# Patient Record
Sex: MALE | Race: WHITE | Employment: FULL TIME | URBAN - METROPOLITAN AREA
[De-identification: names, ages, dates, MRNs, and addresses within clinical notes are randomized per-mention and may not be internally consistent; named-entity substitution may affect disease eponyms.]

---

## 2017-01-25 DIAGNOSIS — M15.9 PRIMARY OSTEOARTHRITIS INVOLVING MULTIPLE JOINTS: ICD-10-CM

## 2017-01-26 NOTE — TELEPHONE ENCOUNTER
Patient's last office visit on 11-14-16  Medication(s) last filled on 12-30-16  Next Appointment: 02-06-17  Rx Class Print

## 2017-01-27 RX ORDER — HYDROCODONE BITARTRATE AND ACETAMINOPHEN 7.5; 325 MG/1; MG/1
1 TABLET ORAL
Qty: 150 TAB | Refills: 0 | Status: SHIPPED | OUTPATIENT
Start: 2017-01-27 | End: 2017-02-20 | Stop reason: SDUPTHER

## 2017-02-20 DIAGNOSIS — M15.9 PRIMARY OSTEOARTHRITIS INVOLVING MULTIPLE JOINTS: ICD-10-CM

## 2017-02-20 NOTE — TELEPHONE ENCOUNTER
Patient's last office visit on 11-04-17  Medication(s) last filled on 01-27-17  Next Appointment: 02-24-17  Rx Class Print

## 2017-02-22 RX ORDER — HYDROCODONE BITARTRATE AND ACETAMINOPHEN 7.5; 325 MG/1; MG/1
1 TABLET ORAL
Qty: 150 TAB | Refills: 0 | Status: SHIPPED | OUTPATIENT
Start: 2017-02-25 | End: 2017-03-20 | Stop reason: SDUPTHER

## 2017-02-22 NOTE — TELEPHONE ENCOUNTER
reviewed, no worrisome findings. This is an early request (last month 1/27/17) - will date it for 2/25/17.

## 2017-03-17 ENCOUNTER — TELEPHONE (OUTPATIENT)
Dept: FAMILY MEDICINE CLINIC | Facility: CLINIC | Age: 52
End: 2017-03-17

## 2017-03-17 DIAGNOSIS — M15.9 PRIMARY OSTEOARTHRITIS INVOLVING MULTIPLE JOINTS: ICD-10-CM

## 2017-03-17 NOTE — TELEPHONE ENCOUNTER
Patient's last office visit on 11-14-16  Medication(s) last filled on 02-25-17  Next Appointment: 03-24-17  Rx Class Print

## 2017-03-20 RX ORDER — HYDROCODONE BITARTRATE AND ACETAMINOPHEN 7.5; 325 MG/1; MG/1
1 TABLET ORAL
Qty: 150 TAB | Refills: 0 | Status: SHIPPED | OUTPATIENT
Start: 2017-03-20 | End: 2017-04-17 | Stop reason: SDUPTHER

## 2017-03-21 NOTE — TELEPHONE ENCOUNTER
Patient requested to have pain medication refilled. Refill was approved and waiting for the patient to  the prescription.

## 2017-03-24 ENCOUNTER — OFFICE VISIT (OUTPATIENT)
Dept: FAMILY MEDICINE CLINIC | Facility: CLINIC | Age: 52
End: 2017-03-24

## 2017-03-24 VITALS
RESPIRATION RATE: 16 BRPM | OXYGEN SATURATION: 96 % | BODY MASS INDEX: 26.74 KG/M2 | HEART RATE: 65 BPM | HEIGHT: 71 IN | WEIGHT: 191 LBS | TEMPERATURE: 96.8 F | DIASTOLIC BLOOD PRESSURE: 91 MMHG | SYSTOLIC BLOOD PRESSURE: 169 MMHG

## 2017-03-24 DIAGNOSIS — M15.9 PRIMARY OSTEOARTHRITIS INVOLVING MULTIPLE JOINTS: ICD-10-CM

## 2017-03-24 DIAGNOSIS — E78.2 MIXED HYPERLIPIDEMIA: ICD-10-CM

## 2017-03-24 DIAGNOSIS — I25.10 CORONARY ARTERY DISEASE INVOLVING NATIVE CORONARY ARTERY OF NATIVE HEART WITHOUT ANGINA PECTORIS: ICD-10-CM

## 2017-03-24 DIAGNOSIS — E11.42 TYPE 2 DIABETES MELLITUS WITH PERIPHERAL NEUROPATHY (HCC): ICD-10-CM

## 2017-03-24 DIAGNOSIS — I10 ESSENTIAL HYPERTENSION WITH GOAL BLOOD PRESSURE LESS THAN 140/90: Primary | ICD-10-CM

## 2017-03-24 DIAGNOSIS — E11.3293: ICD-10-CM

## 2017-04-17 DIAGNOSIS — M15.9 PRIMARY OSTEOARTHRITIS INVOLVING MULTIPLE JOINTS: ICD-10-CM

## 2017-04-17 RX ORDER — HYDROCODONE BITARTRATE AND ACETAMINOPHEN 7.5; 325 MG/1; MG/1
1 TABLET ORAL
Qty: 150 TAB | Refills: 0 | Status: SHIPPED | OUTPATIENT
Start: 2017-04-17 | End: 2017-05-17 | Stop reason: SDUPTHER

## 2017-05-08 DIAGNOSIS — M15.9 PRIMARY OSTEOARTHRITIS INVOLVING MULTIPLE JOINTS: ICD-10-CM

## 2017-05-08 NOTE — TELEPHONE ENCOUNTER
Patient's last office visit on 03-24-17  Medication(s) last filled on 04-17-17  Next Appointment: No appt scheduled  Rx Class Print

## 2017-05-09 RX ORDER — HYDROCODONE BITARTRATE AND ACETAMINOPHEN 7.5; 325 MG/1; MG/1
1 TABLET ORAL
Qty: 150 TAB | Refills: 0 | OUTPATIENT
Start: 2017-05-09

## 2017-05-17 RX ORDER — HYDROCODONE BITARTRATE AND ACETAMINOPHEN 7.5; 325 MG/1; MG/1
1 TABLET ORAL
Qty: 150 TAB | Refills: 0 | Status: SHIPPED | OUTPATIENT
Start: 2017-05-17 | End: 2017-06-16 | Stop reason: SDUPTHER

## 2017-06-16 ENCOUNTER — HOSPITAL ENCOUNTER (OUTPATIENT)
Dept: LAB | Age: 52
Discharge: HOME | End: 2017-06-16
Payer: COMMERCIAL

## 2017-06-16 DIAGNOSIS — M15.9 PRIMARY OSTEOARTHRITIS INVOLVING MULTIPLE JOINTS: ICD-10-CM

## 2017-06-16 DIAGNOSIS — Z79.891 LONG TERM (CURRENT) USE OF OPIATE ANALGESIC: Primary | ICD-10-CM

## 2017-06-16 DIAGNOSIS — Z79.891 LONG TERM (CURRENT) USE OF OPIATE ANALGESIC: ICD-10-CM

## 2017-06-16 PROCEDURE — 80307 DRUG TEST PRSMV CHEM ANLYZR: CPT | Performed by: FAMILY MEDICINE

## 2017-06-16 PROCEDURE — 80361 OPIATES 1 OR MORE: CPT | Performed by: FAMILY MEDICINE

## 2017-06-16 RX ORDER — HYDROCODONE BITARTRATE AND ACETAMINOPHEN 7.5; 325 MG/1; MG/1
1 TABLET ORAL
Qty: 150 TAB | Refills: 0 | Status: SHIPPED | OUTPATIENT
Start: 2017-06-16 | End: 2017-07-13 | Stop reason: SDUPTHER

## 2017-06-16 NOTE — TELEPHONE ENCOUNTER
Patient's last office visit on 03-24-17  Medication(s) last filled on 05-17-17  Next Appointment: 06-19-17  Rx Class Print

## 2017-06-20 ENCOUNTER — OFFICE VISIT (OUTPATIENT)
Dept: FAMILY MEDICINE CLINIC | Facility: CLINIC | Age: 52
End: 2017-06-20

## 2017-06-20 VITALS
WEIGHT: 191 LBS | DIASTOLIC BLOOD PRESSURE: 90 MMHG | BODY MASS INDEX: 26.74 KG/M2 | HEART RATE: 70 BPM | SYSTOLIC BLOOD PRESSURE: 141 MMHG | OXYGEN SATURATION: 96 % | TEMPERATURE: 98.5 F | HEIGHT: 71 IN | RESPIRATION RATE: 16 BRPM

## 2017-06-20 DIAGNOSIS — I10 ESSENTIAL HYPERTENSION WITH GOAL BLOOD PRESSURE LESS THAN 140/90: ICD-10-CM

## 2017-06-20 DIAGNOSIS — E11.42 TYPE 2 DIABETES MELLITUS WITH PERIPHERAL NEUROPATHY (HCC): Primary | ICD-10-CM

## 2017-06-20 DIAGNOSIS — E78.5 HYPERLIPIDEMIA, UNSPECIFIED HYPERLIPIDEMIA TYPE: ICD-10-CM

## 2017-06-20 DIAGNOSIS — M15.9 PRIMARY OSTEOARTHRITIS INVOLVING MULTIPLE JOINTS: ICD-10-CM

## 2017-06-20 DIAGNOSIS — E78.2 MIXED HYPERLIPIDEMIA: ICD-10-CM

## 2017-06-20 DIAGNOSIS — I25.10 CORONARY ARTERY DISEASE INVOLVING NATIVE CORONARY ARTERY OF NATIVE HEART WITHOUT ANGINA PECTORIS: ICD-10-CM

## 2017-06-20 LAB — HBA1C MFR BLD HPLC: 9.2 %

## 2017-06-20 RX ORDER — FENOFIBRATE 145 MG/1
145 TABLET, COATED ORAL DAILY
Qty: 90 TAB | Refills: 3 | Status: SHIPPED | OUTPATIENT
Start: 2017-06-20 | End: 2018-07-20 | Stop reason: SDUPTHER

## 2017-06-20 RX ORDER — AMLODIPINE BESYLATE 10 MG/1
10 TABLET ORAL DAILY
Qty: 90 TAB | Refills: 3 | Status: SHIPPED | OUTPATIENT
Start: 2017-06-20 | End: 2018-07-20 | Stop reason: SDUPTHER

## 2017-06-20 NOTE — PROGRESS NOTES
HISTORY OF PRESENT ILLNESS  Serge Ortiz is a 46 y.o. male. HPI Comments: Seen in follow-up for HTN, type 2 diabetes with neuropathy, CAD (with stents), kidney stones, hyperlipidemia. No problems with medications, though he is out of [place]. Serge Ortiz RTC today to discuss his osteoarthritis that is affecting his back. Significant changes since last visit: none. He is  able to do his normal daily activities. He reports the following adverse side effects: none. Least pain over the last week has been 3/10. Worst pain over the last week has been 7/10. Aberrant behaviors: None. Urine Drug Screen: awaiting results. Pain agreement on file: yes.  reviewed: yes. Concomitant use of a benzodiazepine: no    Naloxone not warranted. Hypertension    Pertinent negatives include no chest pain, no palpitations, no headaches, no shortness of breath, no nausea and no vomiting. Diabetes   Pertinent negatives include no chest pain, no headaches and no shortness of breath. Past Medical History:   Diagnosis Date    Abnormal nuclear cardiac imaging test 01/31/2012    Mod inferior infarction w/mild-mod simona-infarct ischemia. LVE. EF 43%. Marked basal inferior hypk; otherwise mild-mod inferior, inferolateral, distal anteroapical hypk. TID index 1.07. Pos max EST suggests subtotal obstruction of RCA.  Coronary artery disease     Inferior MI in 1998 status post RCA stent; ISR in 1999 status post rotational atherectomy. Posterior wall MI 2002 with circumflex stent.  Dyslipidemia     History of echocardiogram 10/09/2012    EF 50-55%. No WMA. Gr 1 DDfx. RVSP normal.  LAE.  IVCE.  Hyperlipidemia     Hypertension     mild    Non-insulin dependent diabetes mellitus     Renal duplex 12/02/2013    No significant RA stenosis. Patent bilateral renal veins w/o thrombosis.       S/P attempted coronary angioplasty 10/08/2012    Unsuccessful angioplasty of chronic RCA occlusion w/collaterals.  S/P cardiac cath 02/17/2012    %. ISR. LM patent. LAD patent. pD1 55%. mCX patent. OM1 80% (3 x 30 [place] stent o/lapped w/3 x 12 [place] stent; residual 0%). LVEDP 13. EF 50-55%. Past Surgical History:   Procedure Laterality Date    HX APPENDECTOMY      HX UROLOGICAL Right     kidney stone    HX UROLOGICAL Bilateral     orchiopexy for torsion       History   Smoking Status    Former Smoker    Years: 20.00    Quit date: 1/24/2007   Smokeless Tobacco    Never Used     Current Outpatient Prescriptions   Medication Sig    HYDROcodone-acetaminophen (NORCO) 7.5-325 mg per tablet Take 1 Tab by mouth five (5) times daily. Max Daily Amount: 5 Tabs. Indications: Pain    glipiZIDE (GLUCOTROL) 10 mg tablet Take 1 Tab by mouth two (2) times a day.  ramipril (ALTACE) 10 mg capsule Take 1 Cap by mouth daily. Indications: hypertension    clopidogrel (PLAVIX) 75 mg tab Take 1 Tab by mouth daily.  atorvastatin (LIPITOR) 40 mg tablet Take 1 Tab by mouth daily.  metFORMIN (GLUCOPHAGE) 1,000 mg tablet Take 1 Tab by mouth two (2) times daily (with meals). Indications: type 2 diabetes mellitus    hydroCHLOROthiazide (MICROZIDE) 12.5 mg capsule Take 1 Cap by mouth daily. Indications: hypertension    gabapentin (NEURONTIN) 800 mg tablet Take 1 Tab by mouth three (3) times daily. Indications: NEUROPATHIC PAIN    dapagliflozin (FARXIGA) 5 mg tab tablet Take 1 Tab by mouth daily. Indications: type 2 diabetes mellitus    amLODIPine (NORVASC) 10 mg tablet Take 1 Tab by mouth daily. Indications: HYPERTENSION    fenofibrate nanocrystallized (TRICOR) 145 mg tablet Take 1 Tab by mouth daily. Indications: HYPERLIPIDEMIA    aspirin delayed-release 81 mg tablet Take 81 mg by mouth daily.  MULTIVITAMIN PO Take 1 tablet by mouth every morning. No current facility-administered medications for this visit. Review of Systems   Constitutional: Negative for chills and fever. Respiratory: Negative for shortness of breath. Cardiovascular: Negative for chest pain, palpitations and leg swelling. Gastrointestinal: Negative for nausea and vomiting. Neurological: Positive for tingling. Negative for focal weakness and headaches. Psychiatric/Behavioral: The patient does not have insomnia. Visit Vitals    /90    Pulse 70    Temp 98.5 °F (36.9 °C)    Resp 16    Ht 5' 11\" (1.803 m)    Wt 191 lb (86.6 kg)    SpO2 96%    BMI 26.64 kg/m2       Physical Exam   Constitutional: He is oriented to person, place, and time. He appears well-developed and well-nourished. No distress. Neck: Neck supple. No thyromegaly present. Carotid bruit absent   Cardiovascular: Normal rate, regular rhythm and intact distal pulses. Exam reveals no gallop and no friction rub. No murmur heard. Pulmonary/Chest: Effort normal and breath sounds normal. No respiratory distress. Musculoskeletal: He exhibits no edema. Lymphadenopathy:     He has no cervical adenopathy. Neurological: He is alert and oriented to person, place, and time. Skin: Skin is warm and dry. Psychiatric: He has a normal mood and affect. His behavior is normal. Judgment and thought content normal.   Nursing note and vitals reviewed. Recent Results (from the past 12 hour(s))   AMB POC HEMOGLOBIN A1C    Collection Time: 06/20/17  4:14 PM   Result Value Ref Range    Hemoglobin A1c (POC) 9.2 %       ASSESSMENT and PLAN    ICD-10-CM ICD-9-CM    1. Type 2 diabetes mellitus with peripheral neuropathy (HCC) E11.42 250.60 AMB POC HEMOGLOBIN A1C     357.2 dapagliflozin 10 mg tab   2. Essential hypertension with goal blood pressure less than 140/90 I10 401.9 amLODIPine (NORVASC) 10 mg tablet   3. Mixed hyperlipidemia E78.2 272.2 LIPID PANEL      METABOLIC PANEL, COMPREHENSIVE   4. Primary osteoarthritis involving multiple joints M15.0 715.09    5.  Coronary artery disease involving native coronary artery of native heart without angina pectoris I25.10 414.01    6. Hyperlipidemia, unspecified hyperlipidemia type E78.5 272.4 fenofibrate nanocrystallized (TRICOR) 145 mg tablet     Follow-up Disposition:  Return in about 3 months (around 9/20/2017). the following changes in treatment are made: Increase Farxiga to 10mg every day. Refills as noted. Strongly encouraged to take medications consistently. lab results and schedule of future lab studies reviewed with patient  reviewed diet, exercise and weight control  reviewed medications and side effects in detail  Advised to schedule Ophth appointment. This is a chronic problem that is stable. Per review of available records and patients , there are not sign of overuse, misuse, diversion, or concerning side effects. Today we reviewed: the risk of overdose, addiction, and dependency proper storage and disposal of medications the goals of treatment (improve functionality, quality of life, and pain) alternative treatment options including non-narcotic modalities the risks and benefits of continuing with a narcotic based pain regimen  The following changes were made to the patients current treatment plan: no changes in therapy. Plan of care reviewed - patient verbalize(s) understanding and agreement.

## 2017-06-20 NOTE — MR AVS SNAPSHOT
Visit Information Date & Time Provider Department Dept. Phone Encounter #  
 6/20/2017  3:30 PM Ariella Soria MD [employer] [phone] [id number] Follow-up Instructions Return in about 3 months (around 9/20/2017). Your Appointments 8/1/2017  2:20 PM  
Follow Up with Carmella Weinstein MD  
[facility] [place] [address] ([facility]) Appt Note: 9 month follow up with EKG; lm and letter mailed; Rescheduling Appointment From 05/09/2017 [place] [id number]  
[phone] [facility] [place] [address] Upcoming Health Maintenance Date Due  
 LIPID PANEL Q1 2/5/2017 EYE EXAM RETINAL OR DILATED Q1 3/24/2017 HEMOGLOBIN A1C Q6M 5/4/2017 INFLUENZA AGE 9 TO ADULT 8/1/2017 FOOT EXAM Q1 11/4/2017 MICROALBUMIN Q1 11/4/2017 COLONOSCOPY 1/14/2019 DTaP/Tdap/Td series (2 - Td) 2/26/2026 Allergies as of 6/20/2017  Review Complete On: 6/20/2017 By: Ariella Soria MD  
 No Known Allergies Current Immunizations  Reviewed on 11/4/2016 Name Date Pneumococcal Polysaccharide (PPSV-23) 11/4/2016  2:31 PM  
 Tdap 2/26/2016  1:44 PM  
  
 Not reviewed this visit You Were Diagnosed With   
  
 Codes Comments Type 2 diabetes mellitus with peripheral neuropathy (HCC)    -  Primary ICD-10-CM: E11.42 
ICD-9-CM: 250.60, 357.2 Essential hypertension with goal blood pressure less than 140/90     ICD-10-CM: I10 
ICD-9-CM: 401.9 Mixed hyperlipidemia     ICD-10-CM: E78.2 ICD-9-CM: 272.2 Primary osteoarthritis involving multiple joints     ICD-10-CM: M15.0 ICD-9-CM: 715.09 Coronary artery disease involving native coronary artery of native heart without angina pectoris     ICD-10-CM: I25.10 ICD-9-CM: 414.01 Hyperlipidemia, unspecified hyperlipidemia type     ICD-10-CM: E78.5 ICD-9-CM: 272.4 Vitals BP Pulse Temp Resp Height(growth percentile) Weight(growth percentile) 141/90 70 98.5 °F (36.9 °C) 16 5' 11\" (1.803 m) 191 lb (86.6 kg) SpO2 BMI Smoking Status 96% 26.64 kg/m2 Former Smoker Vitals History BMI and BSA Data Body Mass Index Body Surface Area  
 26.64 kg/m 2 2.08 m 2 Preferred Pharmacy Pharmacy Name [facility], [address] [facility] [address] [phone] Your Updated Medication List  
  
   
This list is accurate as of: 6/20/17  4:57 PM.  Always use your most recent med list. amLODIPine 10 mg tablet Commonly known as:  Delphina Peat Take 1 Tab by mouth daily. Indications: hypertension  
  
 aspirin delayed-release 81 mg tablet Take 81 mg by mouth daily. atorvastatin 40 mg tablet Commonly known as:  LIPITOR Take 1 Tab by mouth daily. clopidogrel 75 mg Tab Commonly known as:  PLAVIX Take 1 Tab by mouth daily. dapagliflozin 10 mg Tab Commonly known as:  [employer] Take 1 Tab by mouth daily. Indications: type 2 diabetes mellitus  
  
 fenofibrate nanocrystallized 145 mg tablet Commonly known as:  [employer] Take 1 Tab by mouth daily. Indications: hyperlipidemia  
  
 gabapentin 800 mg tablet Commonly known as:  NEURONTIN Take 1 Tab by mouth three (3) times daily. Indications: NEUROPATHIC PAIN  
  
 glipiZIDE 10 mg tablet Commonly known as:  Maria Luz He Take 1 Tab by mouth two (2) times a day. hydroCHLOROthiazide 12.5 mg capsule Commonly known as:  Rg Specter Take 1 Cap by mouth daily. Indications: hypertension HYDROcodone-acetaminophen 7.5-325 mg per tablet Commonly known as:  [facility] Take 1 Tab by mouth five (5) times daily. Max Daily Amount: 5 Tabs. Indications: Pain  
  
 metFORMIN 1,000 mg tablet Commonly known as:  GLUCOPHAGE Take 1 Tab by mouth two (2) times daily (with meals). Indications: type 2 diabetes mellitus  MULTIVITAMIN PO  
 Take 1 tablet by mouth every morning. ramipril 10 mg capsule Commonly known as:  ALTACE Take 1 Cap by mouth daily. Indications: hypertension Prescriptions Sent to Pharmacy Refills  
 dapagliflozin 10 mg tab 3 Sig: Take 1 Tab by mouth daily. Indications: type 2 diabetes mellitus Class: Normal  
 Pharmacy: [address] [facility], [address] [facility] Ph #: [phone] Route: Oral  
 amLODIPine (NORVASC) 10 mg tablet 3 Sig: Take 1 Tab by mouth daily. Indications: hypertension Class: Normal  
 Pharmacy: [address] [facility], [address] [facility] Ph #: [phone] Route: Oral  
 fenofibrate nanocrystallized (TRICOR) 145 mg tablet 3 Sig: Take 1 Tab by mouth daily. Indications: hyperlipidemia Class: Normal  
 Pharmacy: [address] [facility], [address] [facility] Ph #: [phone] Route: Oral  
  
We Performed the Following AMB POC HEMOGLOBIN A1C [08440 CPT(R)] Follow-up Instructions Return in about 3 months (around 9/20/2017). To-Do List   
 06/20/2017 Lab:  LIPID PANEL   
  
 06/20/2017 Lab:  METABOLIC PANEL, COMPREHENSIVE Introducing \[facility] [place] [facility]\"" & HEALTH SERVICES! Gloria Saavedra introduces [employer] patient portal. Now you can access parts of your medical record, email your doctor's office, and request medication refills online. 1. In your internet browser, go to [email or web address] 2. Click on the First Time User? Click Here link in the Sign In box. You will see the New Member Sign Up page. 3. Enter your [employer] Access Code exactly as it appears below. You will not need to use this code after youve completed the sign-up process. If you do not sign up before the expiration date, you must request a new code. · [facility] Access Code: [email or web address] Expires: 6/22/2017 11:04 AM 
 
4.  Enter the last four digits of your Social Security Number (xxxx) and Date of Birth (mm/dd/yyyy) as indicated and click Submit. You will be taken to the next sign-up page. 5. Create a [employer] ID. This will be your [employer] login ID and cannot be changed, so think of one that is secure and easy to remember. 6. Create a [employer] password. You can change your password at any time. 7. Enter your Password Reset Question and Answer. This can be used at a later time if you forget your password. 8. Enter your e-mail address. You will receive e-mail notification when new information is available in [address] 9. Click Sign Up. You can now view and download portions of your medical record. 10. Click the Download Summary menu link to download a portable copy of your medical information. If you have questions, please visit the Frequently Asked Questions section of the [employer] website. Remember, [employer] is NOT to be used for urgent needs. For medical emergencies, dial 911. Now available from your iPhone and Android! Please provide this summary of care documentation to your next provider. Your primary care clinician is listed as Jaclyn Curling. If you have any questions after today's visit, please call [phone].

## 2017-06-26 LAB
6MAM UR QL SCN: NEGATIVE NG/ML
AMPHETAMINE SCREEN, URINE, 734836: NEGATIVE NG/ML
BARBITURATES UR QL SCN: NEGATIVE NG/ML
BENZODIAZ UR QL: NEGATIVE NG/ML
BUPRENORPHINE, URINE: NEGATIVE NG/ML
BZE UR QL: NEGATIVE NG/ML
CANNABINOIDS UR QL SCN: NEGATIVE NG/ML
CODEINE UR QL: NEGATIVE
CREAT UR-MCNC: 45.4 MG/DL (ref 20–300)
EDDP UR QL: NEGATIVE NG/ML
ETHANOL UR-MCNC: NEGATIVE %
HYDROCODONE UR CFM-MCNC: 1870 NG/ML
HYDROCODONE UR QL: POSITIVE
HYDROMORPHONE UR CFM-MCNC: 1130 NG/ML
HYDROMORPHONE UR QL: POSITIVE
METHADONE UR QL SCN: NEGATIVE NG/ML
MORPHINE UR QL: NEGATIVE
NITRITE UR QL STRIP: NEGATIVE MCG/ML
OPIATES UR QL SCN: NORMAL NG/ML
OPIATES UR QL: POSITIVE NG/ML
OXYCODONE+OXYMORPHONE UR QL SCN: NEGATIVE
OXYCODONE+OXYMORPHONE UR QL SCN: NORMAL NG/ML
PCP UR QL: NEGATIVE NG/ML
PH UR: 6.1 [PH] (ref 4.5–8.9)
PROPOXYPH UR QL: NEGATIVE NG/ML

## 2017-07-13 DIAGNOSIS — M15.9 PRIMARY OSTEOARTHRITIS INVOLVING MULTIPLE JOINTS: ICD-10-CM

## 2017-07-13 NOTE — TELEPHONE ENCOUNTER
Patient's last office visit on 06-20-17  Medication(s) last filled on 06-16-17  Next Appointment: No appt scheduled  Rx Class Print

## 2017-07-14 RX ORDER — HYDROCODONE BITARTRATE AND ACETAMINOPHEN 7.5; 325 MG/1; MG/1
1 TABLET ORAL
Qty: 150 TAB | Refills: 0 | Status: SHIPPED | OUTPATIENT
Start: 2017-07-14 | End: 2017-08-18 | Stop reason: SDUPTHER

## 2017-08-01 ENCOUNTER — OFFICE VISIT (OUTPATIENT)
Dept: CARDIOLOGY CLINIC | Age: 52
End: 2017-08-01

## 2017-08-01 VITALS
DIASTOLIC BLOOD PRESSURE: 72 MMHG | SYSTOLIC BLOOD PRESSURE: 150 MMHG | WEIGHT: 196 LBS | BODY MASS INDEX: 27.44 KG/M2 | HEART RATE: 78 BPM | OXYGEN SATURATION: 98 % | HEIGHT: 71 IN

## 2017-08-01 DIAGNOSIS — I25.5 ISCHEMIC CARDIOMYOPATHY: ICD-10-CM

## 2017-08-01 DIAGNOSIS — I44.1: ICD-10-CM

## 2017-08-01 DIAGNOSIS — E78.2 MIXED HYPERLIPIDEMIA: ICD-10-CM

## 2017-08-01 DIAGNOSIS — I10 ESSENTIAL HYPERTENSION WITH GOAL BLOOD PRESSURE LESS THAN 140/90: ICD-10-CM

## 2017-08-01 DIAGNOSIS — I25.10 CORONARY ARTERY DISEASE INVOLVING NATIVE CORONARY ARTERY OF NATIVE HEART WITHOUT ANGINA PECTORIS: Primary | ICD-10-CM

## 2017-08-01 NOTE — PROGRESS NOTES
HISTORY OF PRESENT ILLNESS  Susan Lloyd is a 46 y.o. male. ASSESSMENT and PLAN    Mr. Susan Lloyd has history of CAD. He has known occluded RCA noted back in 1998 with in-stent restenosis. He did Undergo rotational atherectomy in 1999 but subsequently had restenosis again. Back in October of 2012,  intervention was attempted without success. Patient was continued on medical therapy.  CAD:   Clinically stable. He has not had any significant episodes of angina or changes in his exercise capacity.  First-degree A-V B: His [place] interval has increased from 276 ms now to 300 ms. His overall heart rate remains stable. He is not on any beta blockers.  BP:   Acceptable. It is upper normal to mildly elevated.  HR:    Stable.  CHF:   There is no evidence of decompensated CHF noted.  Weight:   His weight is 196 pounds. His baseline was about 203 pounds.  Cholesterol:   Target LDL <70. He remains on Lipitor 40 mg daily as well as TriCor 145 mg daily.  Anti-platelet:   Remains on ASA. I will see him back in 9 months. Thank you. Encounter Diagnoses   Name Primary?  Coronary artery disease involving native coronary artery of native heart without angina pectoris Yes    Mixed hyperlipidemia     Essential hypertension with goal blood pressure less than 140/90     Other second degree atrioventricular block     Ischemic cardiomyopathy      current treatment plan is effective, no change in therapy  lab results and schedule of future lab studies reviewed with patient  reviewed diet, exercise and weight control  cardiovascular risk and specific lipid/LDL goals reviewed  use of aspirin to prevent MI and TIA's discussed      HPI  Today, Mr. Ronen Cronin has no complaints of chest pains, increased shortness of breath or decreased exercise capacity. He denies any orthopnea or PND. He denies any dizzy spells. He remains quite active physically.   Because of some cutbacks at [employer], he has taken a second job as . He states that this keeps him more active throughout the day and evening. Review of Systems   Respiratory: Negative for shortness of breath. Cardiovascular: Negative for chest pain, palpitations, orthopnea, claudication, leg swelling and PND. Musculoskeletal: Positive for joint pain. All other systems reviewed and are negative. Physical Exam   Constitutional: He is oriented to person, place, and time. He appears well-developed and well-nourished. HENT:   Head: Normocephalic. Eyes: Conjunctivae are normal.   Neck: Neck supple. No JVD present. Carotid bruit is not present. No thyromegaly present. Cardiovascular: Normal rate and regular rhythm. No murmur heard. Pulmonary/Chest: Breath sounds normal.   Abdominal: Bowel sounds are normal.   Musculoskeletal: He exhibits no edema. Neurological: He is alert and oriented to person, place, and time. Skin: Skin is warm and dry. Nursing note and vitals reviewed. PCP: Parag Salgado MD    Past Medical History:   Diagnosis Date    Abnormal nuclear cardiac imaging test 01/31/2012    Mod inferior infarction w/mild-mod simona-infarct ischemia. LVE. EF 43%. Marked basal inferior hypk; otherwise mild-mod inferior, inferolateral, distal anteroapical hypk. TID index 1.07. Pos max EST suggests subtotal obstruction of RCA.  Coronary artery disease     Inferior MI in 1998 status post RCA stent; ISR in 1999 status post rotational atherectomy. Posterior wall MI 2002 with circumflex stent.  Dyslipidemia     History of echocardiogram 10/09/2012    EF 50-55%. No WMA. Gr 1 DDfx. RVSP normal.  LAE.  IVCE.  Hyperlipidemia     Hypertension     mild    Non-insulin dependent diabetes mellitus     Renal duplex 12/02/2013    No significant RA stenosis. Patent bilateral renal veins w/o thrombosis.       S/P attempted coronary angioplasty 10/08/2012    Unsuccessful angioplasty of chronic RCA occlusion w/collaterals.  S/P cardiac cath 02/17/2012    %. ISR. LM patent. LAD patent. pD1 55%. mCX patent. OM1 80% (3 x 30 [place] stent o/lapped w/3 x 12 [place] stent; residual 0%). LVEDP 13. EF 50-55%. Past Surgical History:   Procedure Laterality Date    HX APPENDECTOMY      HX UROLOGICAL Right     kidney stone    HX UROLOGICAL Bilateral     orchiopexy for torsion       Current Outpatient Prescriptions   Medication Sig Dispense Refill    HYDROcodone-acetaminophen (NORCO) 7.5-325 mg per tablet Take 1 Tab by mouth five (5) times daily. Max Daily Amount: 5 Tabs. Indications: Pain 150 Tab 0    dapagliflozin 10 mg tab Take 1 Tab by mouth daily. Indications: type 2 diabetes mellitus 90 Tab 3    amLODIPine (NORVASC) 10 mg tablet Take 1 Tab by mouth daily. Indications: hypertension 90 Tab 3    fenofibrate nanocrystallized (TRICOR) 145 mg tablet Take 1 Tab by mouth daily. Indications: hyperlipidemia 90 Tab 3    glipiZIDE (GLUCOTROL) 10 mg tablet Take 1 Tab by mouth two (2) times a day. 180 Tab 3    ramipril (ALTACE) 10 mg capsule Take 1 Cap by mouth daily. Indications: hypertension 90 Cap 3    clopidogrel (PLAVIX) 75 mg tab Take 1 Tab by mouth daily. 90 Tab 3    atorvastatin (LIPITOR) 40 mg tablet Take 1 Tab by mouth daily. 90 Tab 3    metFORMIN (GLUCOPHAGE) 1,000 mg tablet Take 1 Tab by mouth two (2) times daily (with meals). Indications: type 2 diabetes mellitus 180 Tab 3    hydroCHLOROthiazide (MICROZIDE) 12.5 mg capsule Take 1 Cap by mouth daily. Indications: hypertension 90 Cap 2    gabapentin (NEURONTIN) 800 mg tablet Take 1 Tab by mouth three (3) times daily. Indications: NEUROPATHIC PAIN 270 Tab 3    aspirin delayed-release 81 mg tablet Take 81 mg by mouth daily.  MULTIVITAMIN PO Take 1 tablet by mouth every morning.          The patient has a family history of    Social History   Substance Use Topics    Smoking status: Former Smoker     Years: 20.00     Quit date: 1/24/2007    Smokeless tobacco: Never Used    Alcohol use No       Lab Results   Component Value Date/Time    Cholesterol, total 192 02/05/2016 09:10 AM    HDL Cholesterol 28 02/05/2016 09:10 AM    LDL,Direct 61 02/26/2010 10:34 AM    LDL, calculated 102.8 02/05/2016 09:10 AM    Triglyceride 306 02/05/2016 09:10 AM    CHOL/HDL Ratio 6.9 02/05/2016 09:10 AM        BP Readings from Last 3 Encounters:   08/01/17 150/72   06/20/17 141/90   03/24/17 (!) 169/91        Pulse Readings from Last 3 Encounters:   08/01/17 78   06/20/17 70   03/24/17 65       Wt Readings from Last 3 Encounters:   08/01/17 88.9 kg (196 lb)   06/20/17 86.6 kg (191 lb)   03/24/17 86.6 kg (191 lb)         EKG: unchanged from previous tracings, normal sinus rhythm, Q waves in 3 and aVF, 1st degree AV block.

## 2017-08-01 NOTE — MR AVS SNAPSHOT
Visit Information Date & Time Provider Department Dept. Phone Encounter #  
 8/1/2017  2:20 PM Erich Tan MD [facility] [address] [id number] Upcoming Health Maintenance Date Due  
 LIPID PANEL Q1 2/5/2017 EYE EXAM RETINAL OR DILATED Q1 3/24/2017 INFLUENZA AGE 9 TO ADULT 8/1/2017 FOOT EXAM Q1 11/4/2017 MICROALBUMIN Q1 11/4/2017 HEMOGLOBIN A1C Q6M 12/20/2017 COLONOSCOPY 1/14/2019 DTaP/Tdap/Td series (2 - Td) 2/26/2026 Allergies as of 8/1/2017  Review Complete On: 6/20/2017 By: Luis Burden MD  
 No Known Allergies Current Immunizations  Reviewed on 11/4/2016 Name Date Pneumococcal Polysaccharide (PPSV-23) 11/4/2016  2:31 PM  
 Tdap 2/26/2016  1:44 PM  
  
 Not reviewed this visit You Were Diagnosed With   
  
 Codes Comments Coronary artery disease involving native coronary artery of native heart without angina pectoris    -  Primary ICD-10-CM: I25.10 ICD-9-CM: 414.01 Mixed hyperlipidemia     ICD-10-CM: E78.2 ICD-9-CM: 272.2 Essential hypertension with goal blood pressure less than 140/90     ICD-10-CM: I10 
ICD-9-CM: 401.9 Other second degree atrioventricular block     ICD-10-CM: I44.1 ICD-9-CM: 426.13 Ischemic cardiomyopathy     ICD-10-CM: I25.5 ICD-9-CM: 414.8 Vitals BP Pulse Height(growth percentile) Weight(growth percentile) SpO2 BMI  
 150/72 78 5' 11\" (1.803 m) 196 lb (88.9 kg) 98% 27.34 kg/m2 Smoking Status Former Smoker Vitals History BMI and BSA Data Body Mass Index Body Surface Area  
 27.34 kg/m 2 2.11 m 2 Preferred Pharmacy Pharmacy Name Phone [address] 240 [facility] [address] [phone] Your Updated Medication List  
  
   
This list is accurate as of: 8/1/17  3:09 PM.  Always use your most recent med list. amLODIPine 10 mg tablet Commonly known as:  Achilles Trenton Take 1 Tab by mouth daily. Indications: hypertension  
  
 aspirin delayed-release 81 mg tablet Take 81 mg by mouth daily. atorvastatin 40 mg tablet Commonly known as:  LIPITOR Take 1 Tab by mouth daily. clopidogrel 75 mg Tab Commonly known as:  PLAVIX Take 1 Tab by mouth daily. dapagliflozin 10 mg Tab Commonly known as:  [employer] Take 1 Tab by mouth daily. Indications: type 2 diabetes mellitus  
  
 fenofibrate nanocrystallized 145 mg tablet Commonly known as:  [employer] Take 1 Tab by mouth daily. Indications: hyperlipidemia  
  
 gabapentin 800 mg tablet Commonly known as:  NEURONTIN Take 1 Tab by mouth three (3) times daily. Indications: NEUROPATHIC PAIN  
  
 glipiZIDE 10 mg tablet Commonly known as:  Maryjane Soto Take 1 Tab by mouth two (2) times a day. hydroCHLOROthiazide 12.5 mg capsule Commonly known as:  Jaiden Sinks Take 1 Cap by mouth daily. Indications: hypertension HYDROcodone-acetaminophen 7.5-325 mg per tablet Commonly known as:  Karolyn Araceli Take 1 Tab by mouth five (5) times daily. Max Daily Amount: 5 Tabs. Indications: Pain  
  
 metFORMIN 1,000 mg tablet Commonly known as:  GLUCOPHAGE Take 1 Tab by mouth two (2) times daily (with meals). Indications: type 2 diabetes mellitus MULTIVITAMIN PO Take 1 tablet by mouth every morning. ramipril 10 mg capsule Commonly known as:  ALTACE Take 1 Cap by mouth daily. Indications: hypertension We Performed the Following AMB POC EKG ROUTINE W/ 12 LEADS, INTER & REP [24581 CPT(R)] Introducing [facility] [place] [facility]! Sue Grant introduces [employer] patient portal. Now you can access parts of your medical record, email your doctor's office, and request medication refills online. 1. In your internet browser, go to [email or web address] 2. Click on the First Time User? Click Here link in the Sign In box. You will see the New Member Sign Up page. 3. Enter your [email or web address] Access Code exactly as it appears below. You will not need to use this code after youve completed the sign-up process. If you do not sign up before the expiration date, you must request a new code. · [email or web address] Access Code: [email or web address] Expires: 10/30/2017  3:09 PM 
 
4. Enter the last four digits of your Social Security Number (xxxx) and Date of Birth (mm/dd/yyyy) as indicated and click Submit. You will be taken to the next sign-up page. 5. Create a [email or web address] ID. This will be your [email or web address] login ID and cannot be changed, so think of one that is secure and easy to remember. 6. Create a [email or web address] password. You can change your password at any time. 7. Enter your Password Reset Question and Answer. This can be used at a later time if you forget your password. 8. Enter your e-mail address. You will receive e-mail notification when new information is available in [address] 9. Click Sign Up. You can now view and download portions of your medical record. 10. Click the Download Summary menu link to download a portable copy of your medical information. If you have questions, please visit the Frequently Asked Questions section of the [email or web address] website. Remember, [email or web address] is NOT to be used for urgent needs. For medical emergencies, dial 911. Now available from your iPhone and Android! Please provide this summary of care documentation to your next provider. Your primary care clinician is listed as Neto Leo. If you have any questions after today's visit, please call [phone].

## 2017-08-17 ENCOUNTER — PATIENT OUTREACH (OUTPATIENT)
Dept: FAMILY MEDICINE CLINIC | Facility: CLINIC | Age: 52
End: 2017-08-17

## 2017-08-17 NOTE — PROGRESS NOTES
5523 Retrieved a message today off of voice mail today. Patient stated that he was a patient of Dr. Marie Patricia and that he had called for a refill on his Hydrocodone because he was out and that the doctor covering for Nelly Canales refused to write the refill because she was not comfortable. He had been given my number as someone who will fight for patients and hoped I could help him. 0065 Spoke with Dr. Rob Bal and she relayed the same story that patient did on voice mail. She did tell me that Dr. Clay Rush will be back in tomorrow Friday 8/18/17.    0930 Call placed to home number message left identifying self, position, purpose of call and contact information. 9466 Call placed to patient on work number. Nurse Navigator Identified self, position, and purpose of call. Patient identified with 2 identifiers; name and date of birth.  pateint repeated basic jist of message and asked if this was a common response. I assured him that it was not uncommon in this day with the stricter FDA regulations and the opiate addiction problem in the [place] for doctors to be uncomfortable witting prescriptions for narcotics and that there are many family doctors that will not write for anything stronger than Ultram.  After we talked patient stated that when he called me he was angry and scared that he was out of medication. I asked the patient where he had gotten my name from. He stated that he was sworn to secrecy by the person that told him to call me. At the end of the call the patient stated that his wife had talked him out of his anger and that he was better now and get in touch with Dr. Clay Rush tomorrow.

## 2017-09-12 DIAGNOSIS — M15.9 PRIMARY OSTEOARTHRITIS INVOLVING MULTIPLE JOINTS: ICD-10-CM

## 2017-09-12 NOTE — TELEPHONE ENCOUNTER
Requested Prescriptions     Pending Prescriptions Disp Refills    HYDROcodone-acetaminophen (NORCO) 7.5-325 mg per tablet 150 Tab 0     Sig: Take 1 Tab by mouth five (5) times daily. Max Daily Amount: 5 Tabs.  Indications: Pain

## 2017-09-13 RX ORDER — HYDROCODONE BITARTRATE AND ACETAMINOPHEN 7.5; 325 MG/1; MG/1
1 TABLET ORAL
Qty: 150 TAB | Refills: 0 | Status: SHIPPED | OUTPATIENT
Start: 2017-09-15 | End: 2017-10-10 | Stop reason: SDUPTHER

## 2017-09-13 NOTE — TELEPHONE ENCOUNTER
Patient's last office visit on 06/20/17  Medication(s) last filled on 08/18/17  Next Appointment:  No appt scheduled  Rx Class Print

## 2017-10-10 DIAGNOSIS — M15.9 PRIMARY OSTEOARTHRITIS INVOLVING MULTIPLE JOINTS: ICD-10-CM

## 2017-10-10 RX ORDER — HYDROCODONE BITARTRATE AND ACETAMINOPHEN 7.5; 325 MG/1; MG/1
1 TABLET ORAL
Qty: 150 TAB | Refills: 0 | Status: SHIPPED | OUTPATIENT
Start: 2017-10-10 | End: 2017-11-07 | Stop reason: SDUPTHER

## 2017-10-10 NOTE — TELEPHONE ENCOUNTER
Patient need to sign new pain agreement and needs to reschedule his missed 3 mos appointment    Patient's last office visit on 06-20-17  Medication(s) last filled on 09-15-17  Next Appointment: No appt scheduled  Rx Class Print

## 2017-11-07 DIAGNOSIS — M15.9 PRIMARY OSTEOARTHRITIS INVOLVING MULTIPLE JOINTS: ICD-10-CM

## 2017-11-07 NOTE — TELEPHONE ENCOUNTER
Patient's last office visit on 6/20/2017  Medication(s) last filled on 10/10/2017  Next Appointment: Not scheduled  Rx Class Print

## 2017-11-08 RX ORDER — HYDROCODONE BITARTRATE AND ACETAMINOPHEN 7.5; 325 MG/1; MG/1
1 TABLET ORAL
Qty: 150 TAB | Refills: 0 | Status: SHIPPED | OUTPATIENT
Start: 2017-11-08 | End: 2017-12-06 | Stop reason: SDUPTHER

## 2017-11-13 ENCOUNTER — OFFICE VISIT (OUTPATIENT)
Dept: FAMILY MEDICINE CLINIC | Facility: CLINIC | Age: 52
End: 2017-11-13

## 2017-11-13 VITALS
RESPIRATION RATE: 18 BRPM | OXYGEN SATURATION: 98 % | DIASTOLIC BLOOD PRESSURE: 112 MMHG | HEART RATE: 60 BPM | WEIGHT: 200 LBS | TEMPERATURE: 98 F | SYSTOLIC BLOOD PRESSURE: 174 MMHG | BODY MASS INDEX: 28 KG/M2 | HEIGHT: 71 IN

## 2017-11-13 DIAGNOSIS — M77.02 MEDIAL EPICONDYLITIS OF LEFT ELBOW: ICD-10-CM

## 2017-11-13 DIAGNOSIS — E11.42 TYPE 2 DIABETES MELLITUS WITH PERIPHERAL NEUROPATHY (HCC): Primary | ICD-10-CM

## 2017-11-13 DIAGNOSIS — I25.10 CORONARY ARTERY DISEASE INVOLVING NATIVE CORONARY ARTERY OF NATIVE HEART WITHOUT ANGINA PECTORIS: ICD-10-CM

## 2017-11-13 DIAGNOSIS — E78.2 MIXED HYPERLIPIDEMIA: ICD-10-CM

## 2017-11-13 DIAGNOSIS — I10 ESSENTIAL HYPERTENSION WITH GOAL BLOOD PRESSURE LESS THAN 140/90: ICD-10-CM

## 2017-11-13 LAB
MICROALBUMIN UR TEST STR-MCNC: 10 MG/L
MICROALBUMIN/CREAT RATIO POC: NORMAL MG/G

## 2017-11-13 RX ORDER — LANCETS
EACH MISCELLANEOUS
Qty: 100 EACH | Refills: 3 | Status: SHIPPED | OUTPATIENT
Start: 2017-11-13

## 2017-11-13 RX ORDER — INSULIN PUMP SYRINGE, 3 ML
EACH MISCELLANEOUS
Qty: 1 KIT | Refills: 0 | Status: SHIPPED | OUTPATIENT
Start: 2017-11-13

## 2017-11-13 NOTE — PROGRESS NOTES
HISTORY OF PRESENT ILLNESS  Juan Sales is a 46 y.o. male. HPI Comments: Seen in follow-up for HTN, type 2 diabetes with neuropathy, CAD (with stents), kidney stones, hyperlipidemia. No problems with medications, though he hasn't taken anything for the past 2 days. He doesn't measure his glucose at home (says his glucometer battery is dead). He had a recent eye exam, and a Cardiology appointment. Also concerned about pain in left elbow that he attributes to tennis elbow - he wears a elastic sleeve brace on this, with some relief. Follow-up   Pertinent negatives include no chest pain, no headaches and no shortness of breath. Hypertension    Pertinent negatives include no chest pain, no palpitations, no headaches, no shortness of breath, no nausea and no vomiting. Diabetes   Pertinent negatives include no chest pain, no headaches and no shortness of breath. Past Medical History:   Diagnosis Date    Abnormal nuclear cardiac imaging test 01/31/2012    Mod inferior infarction w/mild-mod simona-infarct ischemia. LVE. EF 43%. Marked basal inferior hypk; otherwise mild-mod inferior, inferolateral, distal anteroapical hypk. TID index 1.07. Pos max EST suggests subtotal obstruction of RCA.  Coronary artery disease     Inferior MI in 1998 status post RCA stent; ISR in 1999 status post rotational atherectomy. Posterior wall MI 2002 with circumflex stent.  Dyslipidemia     History of echocardiogram 10/09/2012    EF 50-55%. No WMA. Gr 1 DDfx. RVSP normal.  LAE.  IVCE.  Hyperlipidemia     Hypertension     mild    Non-insulin dependent diabetes mellitus     Renal duplex 12/02/2013    No significant RA stenosis. Patent bilateral renal veins w/o thrombosis.  S/P attempted coronary angioplasty 10/08/2012    Unsuccessful angioplasty of chronic RCA occlusion w/collaterals.  S/P cardiac cath 02/17/2012    %. ISR. LM patent. LAD patent. pD1 55%. mCX patent.   OM1 80% (3 x 30 [place] stent o/lapped w/3 x 12 Cross[place] stent; residual 0%). LVEDP 13. EF 50-55%. Past Surgical History:   Procedure Laterality Date    HX APPENDECTOMY      HX UROLOGICAL Right     kidney stone    HX UROLOGICAL Bilateral     orchiopexy for torsion       History   Smoking Status    Former Smoker    Years: 20.00    Quit date: 1/24/2007   Smokeless Tobacco    Never Used     Current Outpatient Prescriptions   Medication Sig    HYDROcodone-acetaminophen (NORCO) 7.5-325 mg per tablet Take 1 Tab by mouth five (5) times daily. Max Daily Amount: 5 Tabs. Indications: Pain    dapagliflozin 10 mg tab Take 1 Tab by mouth daily. Indications: type 2 diabetes mellitus    amLODIPine (NORVASC) 10 mg tablet Take 1 Tab by mouth daily. Indications: hypertension    fenofibrate nanocrystallized (TRICOR) 145 mg tablet Take 1 Tab by mouth daily. Indications: hyperlipidemia    glipiZIDE (GLUCOTROL) 10 mg tablet Take 1 Tab by mouth two (2) times a day.  ramipril (ALTACE) 10 mg capsule Take 1 Cap by mouth daily. Indications: hypertension    clopidogrel (PLAVIX) 75 mg tab Take 1 Tab by mouth daily.  atorvastatin (LIPITOR) 40 mg tablet Take 1 Tab by mouth daily.  metFORMIN (GLUCOPHAGE) 1,000 mg tablet Take 1 Tab by mouth two (2) times daily (with meals). Indications: type 2 diabetes mellitus    hydroCHLOROthiazide (MICROZIDE) 12.5 mg capsule Take 1 Cap by mouth daily. Indications: hypertension    gabapentin (NEURONTIN) 800 mg tablet Take 1 Tab by mouth three (3) times daily. Indications: NEUROPATHIC PAIN    aspirin delayed-release 81 mg tablet Take 81 mg by mouth daily.  MULTIVITAMIN PO Take 1 tablet by mouth every morning. No current facility-administered medications for this visit. Review of Systems   Constitutional: Negative for chills and fever. Respiratory: Negative for shortness of breath. Cardiovascular: Negative for chest pain, palpitations and leg swelling.    Gastrointestinal: Negative for nausea and vomiting. Neurological: Positive for tingling. Negative for focal weakness and headaches. Psychiatric/Behavioral: The patient has insomnia (at times). Visit Vitals    BP (!) 174/112 (BP 1 Location: Right arm, BP Patient Position: Sitting)    Pulse 60    Temp 98 °F (36.7 °C) (Oral)    Resp 18    Ht 5' 11\" (1.803 m)    Wt 200 lb (90.7 kg)    SpO2 98%    BMI 27.89 kg/m2       Physical Exam   Constitutional: He is oriented to person, place, and time. He appears well-developed and well-nourished. No distress. Neck: Neck supple. No thyromegaly present. Carotid bruit absent   Cardiovascular: Normal rate, regular rhythm and intact distal pulses. Exam reveals no gallop and no friction rub. No murmur heard. Pulmonary/Chest: Effort normal and breath sounds normal. No respiratory distress. Musculoskeletal: He exhibits no edema. Left elbow: He exhibits normal range of motion. Tenderness found. Medial epicondyle tenderness noted. Diabetic foot exam: Left: Filament test normal sensation with micro filament                 Right: Filament test normal sensation with micro filament  No skin lesions on either foot. Lymphadenopathy:     He has no cervical adenopathy. Neurological: He is alert and oriented to person, place, and time. Skin: Skin is warm and dry. Psychiatric: He has a normal mood and affect. His behavior is normal. Judgment and thought content normal.   Nursing note and vitals reviewed. Recent Results (from the past 12 hour(s))   AMB POC URINE, MICROALBUMIN, SEMIQUANTITATIVE    Collection Time: 11/13/17 12:35 PM   Result Value Ref Range    Microalbumin urine (POC) 10 MG/L    Microalbumin/creat ratio (POC)  MG/G       ASSESSMENT and PLAN    ICD-10-CM ICD-9-CM    1. Type 2 diabetes mellitus with peripheral neuropathy (HCC) E11.42 250.60 AMB POC URINE, MICROALBUMIN, SEMIQUANTITATIVE     357.2 HM DIABETES FOOT EXAM   2.  Essential hypertension with goal blood pressure less than 140/90 I10 401.9    3. Coronary artery disease involving native coronary artery of native heart without angina pectoris I25.10 414.01    4. Mixed hyperlipidemia E78.2 272.2 LIPID PANEL      METABOLIC PANEL, COMPREHENSIVE   5. Medial epicondylitis of left elbow M77.02 726.31 REFERRAL TO ORTHOPEDICS     Follow-up Disposition:  Return in about 3 months (around 2/13/2018). current treatment plan is effective, no change in therapy - STRONGLY advised to take his medications regularly. lab results and schedule of future lab studies reviewed with patient  Referral to Ortho.  reviewed medications and side effects in detail  Declined flu shot. Plan of care reviewed - patient verbalize(s) understanding and agreement.

## 2017-11-13 NOTE — MR AVS SNAPSHOT
Visit Information Date & Time Provider Department Dept. Phone Encounter #  
 11/13/2017 12:15 PM Alicia Johnson MD [facility] [phone] [id number] Your Appointments 8/7/2018  2:20 PM  
Follow Up with Jany Mukherjee MD  
[facility] [place] [address] ([facility]) Appt Note: 1 year follow up with an EKG  
 Sami Deleon [facility] [id number] [phone] [address] [place] [address] [address] Upcoming Health Maintenance Date Due  
 LIPID PANEL Q1 2/5/2017 FOOT EXAM Q1 11/4/2017 MICROALBUMIN Q1 11/4/2017 HEMOGLOBIN A1C Q6M 12/20/2017 EYE EXAM RETINAL OR DILATED Q1 10/3/2018 COLONOSCOPY 1/14/2019 DTaP/Tdap/Td series (2 - Td) 2/26/2026 Allergies as of 11/13/2017  Review Complete On: 11/13/2017 By: Alicia Johnson MD  
 No Known Allergies Current Immunizations  Reviewed on 11/4/2016 Name Date Pneumococcal Polysaccharide (PPSV-23) 11/4/2016  2:31 PM  
 Tdap 2/26/2016  1:44 PM  
  
 Not reviewed this visit You Were Diagnosed With   
  
 Codes Comments Type 2 diabetes mellitus with peripheral neuropathy (HCC)    -  Primary ICD-10-CM: E11.42 
ICD-9-CM: 250.60, 357.2 Essential hypertension with goal blood pressure less than 140/90     ICD-10-CM: I10 
ICD-9-CM: 401.9 Coronary artery disease involving native coronary artery of native heart without angina pectoris     ICD-10-CM: I25.10 ICD-9-CM: 414.01 Mixed hyperlipidemia     ICD-10-CM: E78.2 ICD-9-CM: 272.2 Medial epicondylitis of left elbow     ICD-10-CM: M77.02 
ICD-9-CM: 726.31 Vitals BP Pulse Temp Resp Height(growth percentile) Weight(growth percentile) (!) 174/112 (BP 1 Location: Right arm, BP Patient Position: Sitting) 60 98 °F (36.7 °C) (Oral) 18 5' 11\" (1.803 m) 200 lb (90.7 kg) SpO2 BMI Smoking Status 98% 27.89 kg/m2 Former Smoker Vitals History BMI and BSA Data Body Mass Index Body Surface Area  
 27.89 kg/m 2 2.13 m 2 [facility] Daniella Yepez [address] [place] [address] [phone] Your Updated Medication List  
  
   
This list is accurate as of: 11/13/17  1:08 PM.  Always use your most recent med list. amLODIPine 10 mg tablet Commonly known as:  Chelsey Spruce Take 1 Tab by mouth daily. Indications: hypertension  
  
 aspirin delayed-release 81 mg tablet Take 81 mg by mouth daily. atorvastatin 40 mg tablet Commonly known as:  LIPITOR Take 1 Tab by mouth daily. Blood-Glucose Meter monitoring kit Use once daily as directed. clopidogrel 75 mg Tab Commonly known as:  PLAVIX Take 1 Tab by mouth daily. dapagliflozin 10 mg Tab tablet Commonly known as:  [employer] Take 1 Tab by mouth daily. Indications: type 2 diabetes mellitus  
  
 fenofibrate nanocrystallized 145 mg tablet Commonly known as:  [employer] Take 1 Tab by mouth daily. Indications: hyperlipidemia  
  
 gabapentin 800 mg tablet Commonly known as:  NEURONTIN Take 1 Tab by mouth three (3) times daily. Indications: NEUROPATHIC PAIN  
  
 glipiZIDE 10 mg tablet Commonly known as:  Jordon Hanly Take 1 Tab by mouth two (2) times a day. glucose blood VI test strips strip Commonly known as:  blood glucose test  
Use daily as directed - must match his glucometer. hydroCHLOROthiazide 12.5 mg capsule Commonly known as:  Amalia Bachelor Take 1 Cap by mouth daily. Indications: hypertension HYDROcodone-acetaminophen 7.5-325 mg per tablet Commonly known as:  Herman Minor Take 1 Tab by mouth five (5) times daily. Max Daily Amount: 5 Tabs. Indications: Pain Lancets Misc Use daily as directed  
  
 metFORMIN 1,000 mg tablet Commonly known as:  GLUCOPHAGE  
 Take 1 Tab by mouth two (2) times daily (with meals). Indications: type 2 diabetes mellitus MULTIVITAMIN PO Take 1 tablet by mouth every morning. ramipril 10 mg capsule Commonly known as:  ALTACE Take 1 Cap by mouth daily. Indications: hypertension Prescriptions Sent to Pharmacy Refills Blood-Glucose Meter monitoring kit 0 Sig: Use once daily as directed. Class: Normal  
 [facility] [address] [place] [address], [address] [place] [address] Ph #: [phone]  
 glucose blood VI test strips (BLOOD GLUCOSE TEST) strip 3 Sig: Use daily as directed - must match his glucometer. Class: Normal  
 Pharmacy: [facility] [address] [place] [address] 16 [address] [facility] Ph #: [phone] Lancets misc 3 Sig: Use daily as directed Class: Normal  
 Pharmacy: [facility] [address] [place] [address] [address] [place] [address] Ph #: [phone] We Performed the Following AMB POC URINE, MICROALBUMIN, SEMIQUANTITATIVE [26865 CPT(R)]  DIABETES FOOT EXAM [HM7 Custom] REFERRAL TO ORTHOPEDICS [UPN286 Custom] Comments:  
 Please evaluate for left dominant medial epicondylitis. To-Do List   
 Around 11/13/2017 Lab:  LIPID PANEL Around 11/13/2017 Lab:  METABOLIC PANEL, COMPREHENSIVE Referral Information Referral ID Referred By Referred To  
  
 [id number] Get Padilla MD   
   [address] [place] [address] 1 [facility] Dorothy De La Cruz, [address] Phone: [phone] Fax: [phone] Visits Status Start Date End Date 1 New Request 11/13/17 11/13/18 If your referral has a status of pending review or denied, additional information will be sent to support the outcome of this decision. Introducing [facility] & HEALTH SERVICES! Romayne Duster introduces [employer] patient portal. Now you can access parts of your medical record, email your doctor's office, and request medication refills online. 1. In your internet browser, go to [email or web address] 2. Click on the First Time User? Click Here link in the Sign In box. You will see the New Member Sign Up page. 3. Enter your [employer] Access Code exactly as it appears below. You will not need to use this code after youve completed the sign-up process. If you do not sign up before the expiration date, you must request a new code. · [employer] Access Code: [email or web address] Expires: 2/11/2018 12:16 PM 
 
4. Enter the last four digits of your Social Security Number (xxxx) and Date of Birth (mm/dd/yyyy) as indicated and click Submit. You will be taken to the next sign-up page. 5. Create a [employer] ID. This will be your [employer] login ID and cannot be changed, so think of one that is secure and easy to remember. 6. Create a [employer] password. You can change your password at any time. 7. Enter your Password Reset Question and Answer. This can be used at a later time if you forget your password. 8. Enter your e-mail address. You will receive e-mail notification when new information is available in [address] 9. Click Sign Up. You can now view and download portions of your medical record. 10. Click the Download Summary menu link to download a portable copy of your medical information. If you have questions, please visit the Frequently Asked Questions section of the [employer] website. Remember, [employer] is NOT to be used for urgent needs. For medical emergencies, dial 911. Now available from your iPhone and Android! Please provide this summary of care documentation to your next provider. Your primary care clinician is listed as Adam Roberson. If you have any questions after today's visit, please call [phone].

## 2017-12-08 ENCOUNTER — HOSPITAL ENCOUNTER (OUTPATIENT)
Dept: LAB | Age: 52
Discharge: HOME | End: 2017-12-08
Payer: COMMERCIAL

## 2017-12-08 DIAGNOSIS — Z79.891 LONG TERM (CURRENT) USE OF OPIATE ANALGESIC: ICD-10-CM

## 2017-12-08 PROCEDURE — 80346 BENZODIAZEPINES1-12: CPT | Performed by: FAMILY MEDICINE

## 2017-12-08 PROCEDURE — 80361 OPIATES 1 OR MORE: CPT | Performed by: FAMILY MEDICINE

## 2017-12-08 PROCEDURE — 80307 DRUG TEST PRSMV CHEM ANLYZR: CPT | Performed by: FAMILY MEDICINE

## 2017-12-14 LAB
6MAM UR QL SCN: NEGATIVE NG/ML
ALPRAZ UR QL: NEGATIVE
AMPHETAMINE SCREEN, URINE, 734836: NEGATIVE NG/ML
BARBITURATES UR QL SCN: NEGATIVE NG/ML
BENZODIAZ UR QL CFM: POSITIVE NG/ML
BENZODIAZ UR QL: NORMAL NG/ML
BUPRENORPHINE, URINE: NEGATIVE NG/ML
BZE UR QL: NEGATIVE NG/ML
CANNABINOIDS UR QL SCN: NEGATIVE NG/ML
CLONAZEPAM UR QL: NEGATIVE
CODEINE UR QL: NEGATIVE
CREAT UR-MCNC: 51.4 MG/DL (ref 20–300)
EDDP UR QL: NEGATIVE NG/ML
ETHANOL UR-MCNC: NEGATIVE %
FLURAZEPAM UR QL: NEGATIVE
HYDROCODONE UR CFM-MCNC: 994 NG/ML
HYDROCODONE UR QL: POSITIVE
HYDROMORPHONE UR CFM-MCNC: 1160 NG/ML
HYDROMORPHONE UR QL: POSITIVE
LORAZEPAM UR QL: NEGATIVE
METHADONE UR QL SCN: NEGATIVE NG/ML
MIDAZOLAM UR QL CFM: NEGATIVE
MORPHINE UR QL: NEGATIVE
NITRITE UR QL STRIP: NEGATIVE MCG/ML
NORDIAZEPAM UR CFM-MCNC: 706 NG/ML
NORDIAZEPAM UR QL: POSITIVE
OPIATES UR QL SCN: NORMAL NG/ML
OPIATES UR QL: POSITIVE NG/ML
OXAZEPAM UR CFM-MCNC: 801 NG/ML
OXAZEPAM UR QL: POSITIVE
OXYCODONE+OXYMORPHONE UR QL SCN: NEGATIVE NG/ML
PCP UR QL: NEGATIVE NG/ML
PH UR: 6.5 [PH] (ref 4.5–8.9)
PROPOXYPH UR QL: NEGATIVE NG/ML
TEMAZEPAM UR CFM-MCNC: 1088 NG/ML
TEMAZEPAM UR QL CFM: POSITIVE
TRIAZOLAM UR QL: NEGATIVE

## 2017-12-26 NOTE — PROGRESS NOTES
Patient made aware of abnormal lab results and to schedule appointment with his pcp to discuss results. Verified name and . Patient verbalized an understanding of results and did not voice any concerns at this time.

## 2017-12-26 NOTE — PROGRESS NOTES
Please notify patient that his urine drug screen showed a medication that has not been prescribed for him. He needs to come in to discuss this.

## 2017-12-26 NOTE — PROGRESS NOTES
Left message on patient's voicemail to return call regarding test result and to schedule follow up with his pcp to discuss results.

## 2017-12-29 ENCOUNTER — OFFICE VISIT (OUTPATIENT)
Dept: FAMILY MEDICINE CLINIC | Facility: CLINIC | Age: 52
End: 2017-12-29

## 2017-12-29 VITALS
SYSTOLIC BLOOD PRESSURE: 166 MMHG | BODY MASS INDEX: 27.86 KG/M2 | OXYGEN SATURATION: 96 % | TEMPERATURE: 97 F | WEIGHT: 199 LBS | RESPIRATION RATE: 20 BRPM | HEART RATE: 74 BPM | DIASTOLIC BLOOD PRESSURE: 93 MMHG | HEIGHT: 71 IN

## 2017-12-29 DIAGNOSIS — F51.05 INSOMNIA DUE TO OTHER MENTAL DISORDER: ICD-10-CM

## 2017-12-29 DIAGNOSIS — E11.42 TYPE 2 DIABETES MELLITUS WITH PERIPHERAL NEUROPATHY (HCC): ICD-10-CM

## 2017-12-29 DIAGNOSIS — F99 INSOMNIA DUE TO OTHER MENTAL DISORDER: ICD-10-CM

## 2017-12-29 DIAGNOSIS — F32.2 SEVERE SINGLE CURRENT EPISODE OF MAJOR DEPRESSIVE DISORDER, WITHOUT PSYCHOTIC FEATURES (HCC): Primary | ICD-10-CM

## 2017-12-29 LAB — HBA1C MFR BLD HPLC: 10.1 %

## 2017-12-29 RX ORDER — TRAZODONE HYDROCHLORIDE 100 MG/1
100 TABLET ORAL
Qty: 30 TAB | Refills: 11 | Status: SHIPPED | OUTPATIENT
Start: 2017-12-29 | End: 2018-03-28 | Stop reason: SDUPTHER

## 2017-12-29 RX ORDER — SERTRALINE HYDROCHLORIDE 50 MG/1
50 TABLET, FILM COATED ORAL DAILY
Qty: 30 TAB | Refills: 2 | Status: SHIPPED | OUTPATIENT
Start: 2017-12-29 | End: 2018-03-26 | Stop reason: SDUPTHER

## 2017-12-29 NOTE — PROGRESS NOTES
HISTORY OF PRESENT ILLNESS  Julián Rg is a 45 y.o. male. HPI Comments: Presents to discuss recent depressive symptoms. He is under a great deal of work and family stress, and reports significant depressive symptoms over the past year (see depression screen). He has had enough trouble with insomnia that he has taken Valium (prescribed for his brother) for sleep. No previous treatment for depression. He has a somewhat vague family history of depression. He has thought that he would be better off is he jumped off a bridge, but he hasn't made plans about this. Follow-up   Pertinent negatives include no chest pain and no shortness of breath. Diabetes   Pertinent negatives include no chest pain and no shortness of breath. Past Medical History:   Diagnosis Date    Abnormal nuclear cardiac imaging test 01/31/2012    Mod inferior infarction w/mild-mod simona-infarct ischemia. LVE. EF 43%. Marked basal inferior hypk; otherwise mild-mod inferior, inferolateral, distal anteroapical hypk. TID index 1.07. Pos max EST suggests subtotal obstruction of RCA.  Coronary artery disease     Inferior MI in 1998 status post RCA stent; ISR in 1999 status post rotational atherectomy. Posterior wall MI 2002 with circumflex stent.  Dyslipidemia     History of echocardiogram 10/09/2012    EF 50-55%. No WMA. Gr 1 DDfx. RVSP normal.  LAE.  IVCE.  Hyperlipidemia     Hypertension     mild    Non-insulin dependent diabetes mellitus     Renal duplex 12/02/2013    No significant RA stenosis. Patent bilateral renal veins w/o thrombosis.  S/P attempted coronary angioplasty 10/08/2012    Unsuccessful angioplasty of chronic RCA occlusion w/collaterals.  S/P cardiac cath 02/17/2012    %. ISR. LM patent. LAD patent. pD1 55%. mCX patent. OM1 80% (3 x 30 [place] stent o/lapped w/3 x 12 [place] stent; residual 0%). LVEDP 13. EF 50-55%.          Past Surgical History:   Procedure Laterality Date  HX APPENDECTOMY      HX UROLOGICAL Right     kidney stone    HX UROLOGICAL Bilateral     orchiopexy for torsion       History   Smoking Status    Former Smoker    Years: 20.00    Quit date: 1/24/2007   Smokeless Tobacco    Never Used     Current Outpatient Prescriptions   Medication Sig    HYDROcodone-acetaminophen (NORCO) 7.5-325 mg per tablet Take 1 Tab by mouth five (5) times daily. Max Daily Amount: 5 Tabs. Indications: Pain    Blood-Glucose Meter monitoring kit Use once daily as directed.  glucose blood VI test strips (BLOOD GLUCOSE TEST) strip Use daily as directed - must match his glucometer.  Lancets misc Use daily as directed    dapagliflozin 10 mg tab Take 1 Tab by mouth daily. Indications: type 2 diabetes mellitus    amLODIPine (NORVASC) 10 mg tablet Take 1 Tab by mouth daily. Indications: hypertension    fenofibrate nanocrystallized (TRICOR) 145 mg tablet Take 1 Tab by mouth daily. Indications: hyperlipidemia    glipiZIDE (GLUCOTROL) 10 mg tablet Take 1 Tab by mouth two (2) times a day.  ramipril (ALTACE) 10 mg capsule Take 1 Cap by mouth daily. Indications: hypertension    clopidogrel (PLAVIX) 75 mg tab Take 1 Tab by mouth daily.  atorvastatin (LIPITOR) 40 mg tablet Take 1 Tab by mouth daily.  metFORMIN (GLUCOPHAGE) 1,000 mg tablet Take 1 Tab by mouth two (2) times daily (with meals). Indications: type 2 diabetes mellitus    hydroCHLOROthiazide (MICROZIDE) 12.5 mg capsule Take 1 Cap by mouth daily. Indications: hypertension    gabapentin (NEURONTIN) 800 mg tablet Take 1 Tab by mouth three (3) times daily. Indications: NEUROPATHIC PAIN    aspirin delayed-release 81 mg tablet Take 81 mg by mouth daily.  MULTIVITAMIN PO Take 1 tablet by mouth every morning. No current facility-administered medications for this visit. Review of Systems   Constitutional: Negative for chills and fever. Respiratory: Negative for shortness of breath.     Cardiovascular: Negative for chest pain. Gastrointestinal: Negative for nausea and vomiting. Psychiatric/Behavioral: Positive for depression and suicidal ideas. The patient has insomnia. Visit Vitals    BP (!) 166/93 (BP 1 Location: Right arm, BP Patient Position: Sitting)  Comment: lg cuff automated    Pulse 74    Temp 97 °F (36.1 °C) (Oral)    Resp 20    Ht 5' 11\" (1.803 m)    Wt 199 lb (90.3 kg)    SpO2 96%    BMI 27.75 kg/m2       Physical Exam   Constitutional: He is oriented to person, place, and time. He appears well-developed and well-nourished. No distress. Neck: Neck supple. No thyromegaly present. Carotid bruit absent   Cardiovascular: Normal rate, regular rhythm and intact distal pulses. Exam reveals no gallop and no friction rub. No murmur heard. Pulmonary/Chest: Effort normal and breath sounds normal. No respiratory distress. Musculoskeletal: He exhibits no edema. Lymphadenopathy:     He has no cervical adenopathy. Neurological: He is alert and oriented to person, place, and time. Skin: Skin is warm and dry. Psychiatric: He has a normal mood and affect. His behavior is normal. Judgment and thought content normal.   Nursing note and vitals reviewed. Recent Results (from the past 12 hour(s))   AMB POC HEMOGLOBIN A1C    Collection Time: 12/29/17 11:40 AM   Result Value Ref Range    Hemoglobin A1c (POC) 10.1 %       ASSESSMENT and PLAN    ICD-10-CM ICD-9-CM    1. Severe single current episode of major depressive disorder, without psychotic features ([facility] F32.2 296.23 sertraline (ZOLOFT) 50 mg tablet   2. Insomnia due to other mental disorder F51.05 300.9 traZODone (DESYREL) 100 mg tablet    F99 327.02    3. Type 2 diabetes mellitus with peripheral neuropathy (HCC) E11.42 250.60 AMB POC HEMOGLOBIN A1C     357.2      Follow-up Disposition:  Return in about 4 weeks (around 1/26/2018). the following changes in treatment are made: Add Zoloft, Trazodone. No further Valium.   lab results and schedule of future lab studies reviewed with patient - will need to address poor diabetes control at his next visit. reviewed medications and side effects in detail  Plan of care reviewed - patient verbalize(s) understanding and agreement.

## 2017-12-29 NOTE — MR AVS SNAPSHOT
Visit Information Date & Time Provider Department Dept. Phone Encounter #  
 12/29/2017 11:00 AM Dave Dobbs MD [facility] [phone] [id number] Follow-up Instructions Return in about 4 weeks (around 1/26/2018). Your Appointments 2/13/2018  1:00 PM  
ROUTINE CARE with Dave Dobbs MD  
[facility] ([address]) Appt Note: 3 month f/u appt [address] [facility] [address] [facility] [address]  
[phone]  
  
   
 [address]  
  
    
 8/7/2018  2:20 PM  
Follow Up with Samina Franco MD  
[facility] ([address] [facility]) Appt Note: 1 year follow up with an EKG  
 [facility] [address] [address] [id number] [phone] [address] [place] [address] [address] Upcoming Health Maintenance Date Due  
 LIPID PANEL Q1 2/5/2017 HEMOGLOBIN A1C Q6M 12/20/2017 EYE EXAM RETINAL OR DILATED Q1 10/3/2018 FOOT EXAM Q1 11/13/2018 MICROALBUMIN Q1 11/13/2018 COLONOSCOPY 1/14/2019 DTaP/Tdap/Td series (2 - Td) 2/26/2026 Allergies as of 12/29/2017  Review Complete On: 12/29/2017 By: Dave Dobbs MD  
 No Known Allergies Current Immunizations  Reviewed on 11/4/2016 Name Date Pneumococcal Polysaccharide (PPSV-23) 11/4/2016  2:31 PM  
 Tdap 2/26/2016  1:44 PM  
  
 Not reviewed this visit You Were Diagnosed With   
  
 Codes Comments Severe single current episode of major depressive disorder, without psychotic features ([facility] [address] [address]    -  Primary ICD-10-CM: F32.2 ICD-9-CM: 296.23 Insomnia due to other mental disorder     ICD-10-CM: F51.05, F99 
ICD-9-CM: 300.9, 327.02 Type 2 diabetes mellitus with peripheral neuropathy (HCC)     ICD-10-CM: E11.42 
ICD-9-CM: 250.60, 357.2 Vitals BP Pulse Temp Resp Height(growth percentile) Weight(growth percentile) (!) 166/93 (BP 1 Location: Right arm, BP Patient Position: Sitting) 74 97 °F (36.1 °C) (Oral) 20 5' 11\" (1.803 m) 199 lb (90.3 kg) SpO2 BMI Smoking Status 96% 27.75 kg/m2 Former Smoker Vitals History BMI and BSA Data Body Mass Index Body Surface Area  
 27.75 kg/m 2 2.13 m 2 Preferred Pharmacy Pharmacy Name Phone [facility] [address] [place] [address] [phone] Your Updated Medication List  
  
   
This list is accurate as of: 12/29/17 12:05 PM.  Always use your most recent med list. amLODIPine 10 mg tablet Commonly known as:  Mentasta Citron Take 1 Tab by mouth daily. Indications: hypertension  
  
 aspirin delayed-release 81 mg tablet Take 81 mg by mouth daily. atorvastatin 40 mg tablet Commonly known as:  LIPITOR Take 1 Tab by mouth daily. Blood-Glucose Meter monitoring kit Use once daily as directed. clopidogrel 75 mg Tab Commonly known as:  PLAVIX Take 1 Tab by mouth daily. dapagliflozin 10 mg Tab tablet Commonly known as:  [employer] Take 1 Tab by mouth daily. Indications: type 2 diabetes mellitus  
  
 fenofibrate nanocrystallized 145 mg tablet Commonly known as:  [employer] Take 1 Tab by mouth daily. Indications: hyperlipidemia  
  
 gabapentin 800 mg tablet Commonly known as:  NEURONTIN Take 1 Tab by mouth three (3) times daily. Indications: NEUROPATHIC PAIN  
  
 glipiZIDE 10 mg tablet Commonly known as:  Dina Bonds Take 1 Tab by mouth two (2) times a day. glucose blood VI test strips strip Commonly known as:  blood glucose test  
Use daily as directed - must match his glucometer. hydroCHLOROthiazide 12.5 mg capsule Commonly known as:  Taran Max Take 1 Cap by mouth daily. Indications: hypertension HYDROcodone-acetaminophen 7.5-325 mg per tablet Commonly known as:  Freya Gao  
 Take 1 Tab by mouth five (5) times daily. Max Daily Amount: 5 Tabs. Indications: Pain Lancets Misc Use daily as directed  
  
 metFORMIN 1,000 mg tablet Commonly known as:  GLUCOPHAGE Take 1 Tab by mouth two (2) times daily (with meals). Indications: type 2 diabetes mellitus MULTIVITAMIN PO Take 1 tablet by mouth every morning. ramipril 10 mg capsule Commonly known as:  ALTACE Take 1 Cap by mouth daily. Indications: hypertension  
  
 sertraline 50 mg tablet Commonly known as:  ZOLOFT Take 1 Tab by mouth daily. Indications: major depressive disorder  
  
 traZODone 100 mg tablet Commonly known as:  [employer] Take 1 Tab by mouth nightly. Indications: insomnia associated with depression Prescriptions Sent to Pharmacy Refills  
 sertraline (ZOLOFT) 50 mg tablet 2 Sig: Take 1 Tab by mouth daily. Indications: major depressive disorder Class: Normal  
 Pharmacy: [address] Ph #: [phone] Route: Oral  
 traZODone (DESYREL) 100 mg tablet 11 Sig: Take 1 Tab by mouth nightly. Indications: insomnia associated with depression Class: Normal  
 Pharmacy: [facility] Ph #: [phone] Route: Oral  
  
We Performed the Following AMB POC HEMOGLOBIN A1C [29407 CPT(R)] Follow-up Instructions Return in about 4 weeks (around 1/26/2018). Introducing [address]! [facility] introduces [employer] patient portal. Now you can access parts of your medical record, email your doctor's office, and request medication refills online. 1. In your internet browser, go to [email or web address] 2. Click on the First Time User? Click Here link in the Sign In box. You will see the New Member Sign Up page. 3. Enter your [email or web address] Access Code exactly as it appears below. You will not need to use this code after youve completed the sign-up process. If you do not sign up before the expiration date, you must request a new code. · [email or web address] Access Code: [email or web address] Expires: 2/11/2018 12:16 PM 
 
4. Enter the last four digits of your Social Security Number (xxxx) and Date of Birth (mm/dd/yyyy) as indicated and click Submit. You will be taken to the next sign-up page. 5. Create a [email or web address] ID. This will be your [email or web address] login ID and cannot be changed, so think of one that is secure and easy to remember. 6. Create a [email or web address] password. You can change your password at any time. 7. Enter your Password Reset Question and Answer. This can be used at a later time if you forget your password. 8. Enter your e-mail address. You will receive e-mail notification when new information is available in [address] 9. Click Sign Up. You can now view and download portions of your medical record. 10. Click the Download Summary menu link to download a portable copy of your medical information. If you have questions, please visit the Frequently Asked Questions section of the [email or web address] website. Remember, [email or web address] is NOT to be used for urgent needs. For medical emergencies, dial 911. Now available from your iPhone and Android! Please provide this summary of care documentation to your next provider. Your primary care clinician is listed as Agustín Jarad. If you have any questions after today's visit, please call [phone].

## 2017-12-29 NOTE — PROGRESS NOTES
Chief Complaint   Patient presents with    Follow-up     Abnormal UDS . Patient states that he has been taking his brothers diazepam. Patient states that he has not taken any other medications. Patient is requesting a script for diazepam. Patient request a increase dose in hydrocodone.  Diabetes     1. Have you been to the ER, urgent care clinic since your last visit? Hospitalized since your last visit? No    2. Have you seen or consulted any other health care providers outside of the [address] [place] [address] since your last visit? Include any pap smears or colon screening.  No

## 2018-01-03 DIAGNOSIS — M15.9 PRIMARY OSTEOARTHRITIS INVOLVING MULTIPLE JOINTS: ICD-10-CM

## 2018-01-03 RX ORDER — HYDROCODONE BITARTRATE AND ACETAMINOPHEN 7.5; 325 MG/1; MG/1
1 TABLET ORAL
Qty: 150 TAB | Refills: 0 | Status: SHIPPED | OUTPATIENT
Start: 2018-01-03 | End: 2018-01-29 | Stop reason: SDUPTHER

## 2018-01-03 NOTE — TELEPHONE ENCOUNTER
Patient's last office visit on 12/29/2017  Medication(s) last filled on 12/6/2017  Next Appointment: 1/26/2018  Rx Class Print

## 2018-01-29 DIAGNOSIS — M15.9 PRIMARY OSTEOARTHRITIS INVOLVING MULTIPLE JOINTS: ICD-10-CM

## 2018-01-29 RX ORDER — HYDROCODONE BITARTRATE AND ACETAMINOPHEN 7.5; 325 MG/1; MG/1
1 TABLET ORAL
Qty: 150 TAB | Refills: 0 | Status: SHIPPED | OUTPATIENT
Start: 2018-01-29 | End: 2018-03-01 | Stop reason: SDUPTHER

## 2018-01-29 NOTE — TELEPHONE ENCOUNTER
Patient's last office visit on 12/29/2017  Medication(s) last filled on 1/3/2018  Next Appointment: 1/30/2018  Rx Class Print

## 2018-01-30 ENCOUNTER — OFFICE VISIT (OUTPATIENT)
Dept: FAMILY MEDICINE CLINIC | Facility: CLINIC | Age: 53
End: 2018-01-30

## 2018-01-30 VITALS
TEMPERATURE: 96.9 F | OXYGEN SATURATION: 95 % | DIASTOLIC BLOOD PRESSURE: 83 MMHG | HEIGHT: 71 IN | BODY MASS INDEX: 27.16 KG/M2 | WEIGHT: 194 LBS | SYSTOLIC BLOOD PRESSURE: 134 MMHG | HEART RATE: 76 BPM

## 2018-01-30 DIAGNOSIS — F32.2 SEVERE SINGLE CURRENT EPISODE OF MAJOR DEPRESSIVE DISORDER, WITHOUT PSYCHOTIC FEATURES (HCC): Primary | ICD-10-CM

## 2018-01-30 DIAGNOSIS — H65.01 RIGHT ACUTE SEROUS OTITIS MEDIA, RECURRENCE NOT SPECIFIED: ICD-10-CM

## 2018-01-30 RX ORDER — FLUTICASONE PROPIONATE 50 MCG
2 SPRAY, SUSPENSION (ML) NASAL
Qty: 1 BOTTLE | Refills: 5 | Status: SHIPPED | OUTPATIENT
Start: 2018-01-30 | End: 2018-03-28

## 2018-01-30 NOTE — PROGRESS NOTES
Identified pt with two pt identifiers(name and ). Reviewed record in preparation for visit and have obtained necessary documentation. Chief Complaint   Patient presents with    Complete Physical    Ear Fullness     reports trying OTC ear drop,started a few months ago        Health Maintenance Due   Topic    LIPID PANEL Q1       reviewed w/patient. Coordination of Care Questionnaire:  :   1) Have you been to an emergency room, urgent care clinic since your last visit? no   Hospitalized since your last visit? no             2. Have seen or consulted any other health care provider since your last visit? No  If yes, where when, and reason for visit? 3) Do you have an Advanced Directive/ Living Will in place? No  If yes, do we have a copy on file No      Patient is accompanied by self.

## 2018-01-30 NOTE — PROGRESS NOTES
HISTORY OF PRESENT ILLNESS  Nanci Cantrell is a 45 y.o. male. HPI Comments: Seen in follow-up for depression. No problems with new medications. He feels that the Zoloft is helping him with depressed mood, and Trazodone has worked well for sleep. Also concerned about right ear fullness and echoing for the past few months - unable to clear this ear with Valsalva. Depression   Pertinent negatives include no chest pain, no headaches and no shortness of breath. Ear Fullness    Pertinent negatives include no headaches and no vomiting. Past Medical History:   Diagnosis Date    Abnormal nuclear cardiac imaging test 01/31/2012    Mod inferior infarction w/mild-mod simona-infarct ischemia. LVE. EF 43%. Marked basal inferior hypk; otherwise mild-mod inferior, inferolateral, distal anteroapical hypk. TID index 1.07. Pos max EST suggests subtotal obstruction of RCA.  Coronary artery disease     Inferior MI in 1998 status post RCA stent; ISR in 1999 status post rotational atherectomy. Posterior wall MI 2002 with circumflex stent.  Dyslipidemia     History of echocardiogram 10/09/2012    EF 50-55%. No WMA. Gr 1 DDfx. RVSP normal.  LAE.  IVCE.  Hyperlipidemia     Hypertension     mild    Non-insulin dependent diabetes mellitus     Renal duplex 12/02/2013    No significant RA stenosis. Patent bilateral renal veins w/o thrombosis.  S/P attempted coronary angioplasty 10/08/2012    Unsuccessful angioplasty of chronic RCA occlusion w/collaterals.  S/P cardiac cath 02/17/2012    %. ISR. LM patent. LAD patent. pD1 55%. mCX patent. OM1 80% (3 x 30 [place] stent o/lapped w/3 x 12 [place] stent; residual 0%). LVEDP 13. EF 50-55%.          Past Surgical History:   Procedure Laterality Date    HX APPENDECTOMY      HX UROLOGICAL Right     kidney stone    HX UROLOGICAL Bilateral     orchiopexy for torsion       History   Smoking Status    Former Smoker    Years: 20.00    Quit date: 1/24/2007   Smokeless Tobacco    Never Used     Current Outpatient Prescriptions   Medication Sig    HYDROcodone-acetaminophen (NORCO) 7.5-325 mg per tablet Take 1 Tab by mouth five (5) times daily. Max Daily Amount: 5 Tabs. Indications: Pain    sertraline (ZOLOFT) 50 mg tablet Take 1 Tab by mouth daily. Indications: major depressive disorder    traZODone (DESYREL) 100 mg tablet Take 1 Tab by mouth nightly. Indications: insomnia associated with depression    Blood-Glucose Meter monitoring kit Use once daily as directed.  glucose blood VI test strips (BLOOD GLUCOSE TEST) strip Use daily as directed - must match his glucometer.  Lancets misc Use daily as directed    dapagliflozin 10 mg tab Take 1 Tab by mouth daily. Indications: type 2 diabetes mellitus    amLODIPine (NORVASC) 10 mg tablet Take 1 Tab by mouth daily. Indications: hypertension    fenofibrate nanocrystallized (TRICOR) 145 mg tablet Take 1 Tab by mouth daily. Indications: hyperlipidemia    glipiZIDE (GLUCOTROL) 10 mg tablet Take 1 Tab by mouth two (2) times a day.  ramipril (ALTACE) 10 mg capsule Take 1 Cap by mouth daily. Indications: hypertension    clopidogrel (PLAVIX) 75 mg tab Take 1 Tab by mouth daily.  atorvastatin (LIPITOR) 40 mg tablet Take 1 Tab by mouth daily.  metFORMIN (GLUCOPHAGE) 1,000 mg tablet Take 1 Tab by mouth two (2) times daily (with meals). Indications: type 2 diabetes mellitus    hydroCHLOROthiazide (MICROZIDE) 12.5 mg capsule Take 1 Cap by mouth daily. Indications: hypertension    gabapentin (NEURONTIN) 800 mg tablet Take 1 Tab by mouth three (3) times daily. Indications: NEUROPATHIC PAIN    aspirin delayed-release 81 mg tablet Take 81 mg by mouth daily.  MULTIVITAMIN PO Take 1 tablet by mouth every morning. No current facility-administered medications for this visit. Review of Systems   Constitutional: Negative for chills and fever. HENT: Positive for ear pain.     Respiratory: Negative for shortness of breath. Cardiovascular: Negative for chest pain, palpitations and leg swelling. Gastrointestinal: Negative for nausea and vomiting. Neurological: Positive for tingling. Negative for focal weakness and headaches. Psychiatric/Behavioral: Positive for depression. The patient does not have insomnia. Visit Vitals    /83 (BP 1 Location: Right arm, BP Patient Position: Sitting)    Pulse 76    Temp 96.9 °F (36.1 °C) (Oral)    Ht 5' 11\" (1.803 m)    Wt 194 lb (88 kg)    SpO2 95%    BMI 27.06 kg/m2       Physical Exam   Constitutional: He is oriented to person, place, and time. He appears well-developed and well-nourished. No distress. HENT:   Right Ear: Tympanic membrane, external ear and ear canal normal.   Left Ear: Tympanic membrane, external ear and ear canal normal.   Nose: Mucosal edema present. Mouth/Throat: Oropharynx is clear and moist.   Neck: Neck supple. No thyromegaly present. Cardiovascular: Normal rate and regular rhythm. Exam reveals no gallop and no friction rub. No murmur heard. Pulmonary/Chest: Effort normal and breath sounds normal. No respiratory distress. Lymphadenopathy:     He has no cervical adenopathy. Neurological: He is alert and oriented to person, place, and time. Skin: Skin is warm and dry. Psychiatric: He has a normal mood and affect. His behavior is normal. Judgment and thought content normal.       ASSESSMENT and PLAN    ICD-10-CM ICD-9-CM    1. Severe single current episode of major depressive disorder, without psychotic features ([facility] F32.2 296.23    2. Right acute serous otitis media, recurrence not specified H65.01 381.01 fluticasone (FLONASE) 50 mcg/actuation nasal spray     Follow-up Disposition:  Return in about 6 weeks (around 3/13/2018). the following changes in treatment are made: Add Flonase for [employer].    reviewed medications and side effects in detail  Plan of care reviewed - patient verbalize(s) understanding and agreement.

## 2018-01-30 NOTE — MR AVS SNAPSHOT
[address] [facility] 
 
 
 [address] [facility] [address] [place] [address] 
[phone] Patient: Lore Davenport MRN: [id number] Visit Information Date & Time Provider Department Dept. Phone Encounter #  
 2018 10:15 AM Gayla Griggs MD [facility] [phone] [id number] Follow-up Instructions Return in about 6 weeks (around 3/13/2018). Your Appointments 2018  1:00 PM  
ROUTINE CARE with Gayla Griggs MD  
[facility] ([place] [facility]) Appt Note: 3 month f/u appt [address] [facility] [address] [facility] [address]  
[phone]  
  
   
 [address] [facility] [address] [facility]  
  
    
 2018  2:20 PM  
Follow Up with Ashley Sales MD  
[facility] ([place] [facility] [facility] Appt Note: 1 year follow up with an EKG  
 [address] [address] [phone] [address] [place] [address] [address] Upcoming Health Maintenance Date Due  
 LIPID PANEL Q1 2017 HEMOGLOBIN A1C Q6M 2018 EYE EXAM RETINAL OR DILATED Q1 10/3/2018 FOOT EXAM Q1 2018 MICROALBUMIN Q1 2018 COLONOSCOPY 2019 DTaP/Tdap/Td series (2 - Td) 2026 Allergies as of 2018  Review Complete On: 2018 By: Galya Griggs MD  
 No Known Allergies Current Immunizations  Reviewed on 2018 Name Date Pneumococcal Polysaccharide (PPSV-23) 2016  2:31 PM  
 Tdap 2016  1:44 PM  
  
 Reviewed by Naldo Phillips LPN on 3/30/1978 at 74:18 AM  
You Were Diagnosed With   
  
 Codes Comments Severe single current episode of major depressive disorder, without psychotic features ([facility]    -  Primary ICD-10-CM: F32.2 ICD-9-CM: 296.23  Right acute serous otitis media, recurrence not specified     ICD-10-CM: H65.01 
ICD-9-CM: 381.01   
  
 Vitals BP Pulse Temp Height(growth percentile) Weight(growth percentile) SpO2  
 134/83 (BP 1 Location: Right arm, BP Patient Position: Sitting) 76 96.9 °F (36.1 °C) (Oral) 5' 11\" (1.803 m) 194 lb (88 kg) 95% BMI Smoking Status 27.06 kg/m2 Former Smoker Vitals History BMI and BSA Data Body Mass Index Body Surface Area  
 27.06 kg/m 2 2.1 m 2 Preferred Pharmacy Pharmacy Name Phone [facility] Daniella Yepez [address] [place] [address] [phone] Your Updated Medication List  
  
   
This list is accurate as of: 1/30/18 11:33 AM.  Always use your most recent med list. amLODIPine 10 mg tablet Commonly known as:  Jany Kang Take 1 Tab by mouth daily. Indications: hypertension  
  
 aspirin delayed-release 81 mg tablet Take 81 mg by mouth daily. atorvastatin 40 mg tablet Commonly known as:  LIPITOR Take 1 Tab by mouth daily. Blood-Glucose Meter monitoring kit Use once daily as directed. clopidogrel 75 mg Tab Commonly known as:  PLAVIX Take 1 Tab by mouth daily. dapagliflozin 10 mg Tab tablet Commonly known as:  [employer] Take 1 Tab by mouth daily. Indications: type 2 diabetes mellitus  
  
 fenofibrate nanocrystallized 145 mg tablet Commonly known as:  [employer] Take 1 Tab by mouth daily. Indications: hyperlipidemia  
  
 fluticasone 50 mcg/actuation nasal spray Commonly known as:  Delmon Pickup 2 Sprays by Both Nostrils route daily as needed for Rhinitis. Indications: Allergic Rhinitis  
  
 gabapentin 800 mg tablet Commonly known as:  NEURONTIN Take 1 Tab by mouth three (3) times daily. Indications: NEUROPATHIC PAIN  
  
 glipiZIDE 10 mg tablet Commonly known as:  Netta Formosa Take 1 Tab by mouth two (2) times a day. glucose blood VI test strips strip Commonly known as:  blood glucose test  
Use daily as directed - must match his glucometer. hydroCHLOROthiazide 12.5 mg capsule Commonly known as:  Luci Fernando Take 1 Cap by mouth daily. Indications: hypertension HYDROcodone-acetaminophen 7.5-325 mg per tablet Commonly known as:  Violeta Gut Take 1 Tab by mouth five (5) times daily. Max Daily Amount: 5 Tabs. Indications: Pain Lancets Misc Use daily as directed  
  
 metFORMIN 1,000 mg tablet Commonly known as:  GLUCOPHAGE Take 1 Tab by mouth two (2) times daily (with meals). Indications: type 2 diabetes mellitus MULTIVITAMIN PO Take 1 tablet by mouth every morning. ramipril 10 mg capsule Commonly known as:  ALTACE Take 1 Cap by mouth daily. Indications: hypertension  
  
 sertraline 50 mg tablet Commonly known as:  ZOLOFT Take 1 Tab by mouth daily. Indications: major depressive disorder  
  
 traZODone 100 mg tablet Commonly known as:  [employer] Take 1 Tab by mouth nightly. Indications: insomnia associated with depression Prescriptions Printed Refills  
 fluticasone (FLONASE) 50 mcg/actuation nasal spray 5 Si Sprays by Both Nostrils route daily as needed for Rhinitis. Indications: Allergic Rhinitis Class: Print Route: Both Nostrils Follow-up Instructions Return in about 6 weeks (around 3/13/2018). Introducing [facility]! Cindy Bran introduces [employer] patient portal. Now you can access parts of your medical record, email your doctor's office, and request medication refills online. 1. In your internet browser, go to [email or web address]. [email or web address] 2. Click on the First Time User? Click Here link in the Sign In box. You will see the New Member Sign Up page. 3. Enter your [employer] Access Code exactly as it appears below. You will not need to use this code after youve completed the sign-up process. If you do not sign up before the expiration date, you must request a new code. · [employer] Access Code: [email or web address] Expires: 2018 12:16 PM 
 
 4. Enter the last four digits of your Social Security Number (xxxx) and Date of Birth (mm/dd/yyyy) as indicated and click Submit. You will be taken to the next sign-up page. 5. Create a [employer] ID. This will be your [employer] login ID and cannot be changed, so think of one that is secure and easy to remember. 6. Create a [employer] password. You can change your password at any time. 7. Enter your Password Reset Question and Answer. This can be used at a later time if you forget your password. 8. Enter your e-mail address. You will receive e-mail notification when new information is available in [address] 9. Click Sign Up. You can now view and download portions of your medical record. 10. Click the Download Summary menu link to download a portable copy of your medical information. If you have questions, please visit the Frequently Asked Questions section of the [employer] website. Remember, [employer] is NOT to be used for urgent needs. For medical emergencies, dial 911. Now available from your iPhone and Android! Please provide this summary of care documentation to your next provider. Your primary care clinician is listed as Kirti Darling. If you have any questions after today's visit, please call [phone].

## 2018-03-01 DIAGNOSIS — M15.9 PRIMARY OSTEOARTHRITIS INVOLVING MULTIPLE JOINTS: ICD-10-CM

## 2018-03-01 NOTE — TELEPHONE ENCOUNTER
Patient's last office visit on 1/30/2018  Medication(s) last filled on 1/29/2018  Next Appointment: 3/13/2018  Rx Class Print

## 2018-03-02 RX ORDER — HYDROCODONE BITARTRATE AND ACETAMINOPHEN 7.5; 325 MG/1; MG/1
1 TABLET ORAL
Qty: 150 TAB | Refills: 0 | Status: SHIPPED | OUTPATIENT
Start: 2018-03-02 | End: 2018-03-27 | Stop reason: SDUPTHER

## 2018-03-27 DIAGNOSIS — M15.9 PRIMARY OSTEOARTHRITIS INVOLVING MULTIPLE JOINTS: ICD-10-CM

## 2018-03-27 NOTE — TELEPHONE ENCOUNTER
Patient's last office visit on 1/30/2018  Medication(s) last filled on 3/2/2018  Next Appointment: 3/28/2017  Rx Class Print

## 2018-03-28 ENCOUNTER — OFFICE VISIT (OUTPATIENT)
Dept: FAMILY MEDICINE CLINIC | Facility: CLINIC | Age: 53
End: 2018-03-28

## 2018-03-28 VITALS
BODY MASS INDEX: 27.77 KG/M2 | RESPIRATION RATE: 16 BRPM | HEIGHT: 71 IN | HEART RATE: 84 BPM | OXYGEN SATURATION: 96 % | WEIGHT: 198.4 LBS | TEMPERATURE: 97.7 F | SYSTOLIC BLOOD PRESSURE: 154 MMHG | DIASTOLIC BLOOD PRESSURE: 85 MMHG

## 2018-03-28 DIAGNOSIS — F51.05 INSOMNIA DUE TO OTHER MENTAL DISORDER: ICD-10-CM

## 2018-03-28 DIAGNOSIS — I10 ESSENTIAL HYPERTENSION WITH GOAL BLOOD PRESSURE LESS THAN 140/90: Primary | ICD-10-CM

## 2018-03-28 DIAGNOSIS — M15.9 PRIMARY OSTEOARTHRITIS INVOLVING MULTIPLE JOINTS: ICD-10-CM

## 2018-03-28 DIAGNOSIS — F99 INSOMNIA DUE TO OTHER MENTAL DISORDER: ICD-10-CM

## 2018-03-28 DIAGNOSIS — E11.42 TYPE 2 DIABETES MELLITUS WITH PERIPHERAL NEUROPATHY (HCC): ICD-10-CM

## 2018-03-28 DIAGNOSIS — F32.2 SEVERE SINGLE CURRENT EPISODE OF MAJOR DEPRESSIVE DISORDER, WITHOUT PSYCHOTIC FEATURES (HCC): ICD-10-CM

## 2018-03-28 DIAGNOSIS — E11.3293 MILD NONPROLIFERATIVE DIABETIC RETINOPATHY OF BOTH EYES ASSOCIATED WITH TYPE 2 DIABETES MELLITUS, MACULAR EDEMA PRESENCE UNSPECIFIED (HCC): ICD-10-CM

## 2018-03-28 DIAGNOSIS — E78.2 MIXED HYPERLIPIDEMIA: ICD-10-CM

## 2018-03-28 DIAGNOSIS — I25.10 CORONARY ARTERY DISEASE INVOLVING NATIVE CORONARY ARTERY OF NATIVE HEART WITHOUT ANGINA PECTORIS: ICD-10-CM

## 2018-03-28 RX ORDER — TRAZODONE HYDROCHLORIDE 100 MG/1
100 TABLET ORAL
Qty: 90 TAB | Refills: 3 | Status: SHIPPED | OUTPATIENT
Start: 2018-03-28 | End: 2019-04-24 | Stop reason: SDUPTHER

## 2018-03-28 RX ORDER — HYDROCODONE BITARTRATE AND ACETAMINOPHEN 7.5; 325 MG/1; MG/1
1 TABLET ORAL
Qty: 150 TAB | Refills: 0 | Status: SHIPPED | OUTPATIENT
Start: 2018-03-28 | End: 2018-04-24 | Stop reason: SDUPTHER

## 2018-03-28 RX ORDER — HYDROCHLOROTHIAZIDE 25 MG/1
25 TABLET ORAL DAILY
Qty: 90 TAB | Refills: 3 | Status: SHIPPED | OUTPATIENT
Start: 2018-03-28 | End: 2019-04-24 | Stop reason: SDUPTHER

## 2018-03-28 RX ORDER — SERTRALINE HYDROCHLORIDE 50 MG/1
50 TABLET, FILM COATED ORAL DAILY
Qty: 90 TAB | Refills: 3 | Status: SHIPPED | OUTPATIENT
Start: 2018-03-28 | End: 2019-04-24 | Stop reason: SDUPTHER

## 2018-03-28 NOTE — PROGRESS NOTES
Identified pt with two pt identifiers(name and ). Reviewed record in preparation for visit and have obtained necessary documentation. Chief Complaint   Patient presents with    Hypertension    Diabetes    Ear Fullness     right        Health Maintenance Due   Topic    LIPID PANEL Q1     reviewed w/patient. Depression Screening:  PHQ over the last two weeks 2017 2017 3/24/2017 8/3/2015   Little interest or pleasure in doing things Nearly every day Not at all Not at all Not at all   Feeling down, depressed or hopeless Nearly every day Not at all Not at all Not at all   Total Score PHQ 2 6 0 0 0   Trouble falling or staying asleep, or sleeping too much Nearly every day - - -   Feeling tired or having little energy Nearly every day - - -   Poor appetite or overeating Not at all - - -   Feeling bad about yourself - or that you are a failure or have let yourself or your family down Nearly every day - - -   Trouble concentrating on things such as school, work, reading or watching TV Nearly every day - - -   Moving or speaking so slowly that other people could have noticed; or the opposite being so fidgety that others notice Not at all - - -   Thoughts of being better off dead, or hurting yourself in some way Nearly every day - - -   PHQ 9 Score 21 - - -   How difficult have these problems made it for you to do your work, take care of your home and get along with others Somewhat difficult - - -       Learning Assessment:  Learning Assessment 2017   PRIMARY LEARNER Patient Patient Patient   HIGHEST LEVEL OF EDUCATION - PRIMARY LEARNER  - - [phone] CAREGIVER - - No   PRIMARY LANGUAGE ENGLISH ENGLISH ENGLISH   LEARNER PREFERENCE PRIMARY DEMONSTRATION DEMONSTRATION READING   ANSWERED BY Patient Patient patient   RELATIONSHIP SELF SELF SELF       Abuse Screening:  Abuse Screening Questionnaire 3/28/2018   Do you ever feel afraid of your partner?  N   Are you in a relationship with someone who physically or mentally threatens you? N   Is it safe for you to go home? Y       Fall Risk  No flowsheet data found. Coordination of Care Questionnaire:  :   1) Have you been to an emergency room, urgent care clinic since your last visit? no   Hospitalized since your last visit? no             2. Have seen or consulted any other health care provider since your last visit? NO  If yes, where when, and reason for visit? 3) Do you have an Advanced Directive/ Living Will in place? NO  If no, would you like information NO    Patient is accompanied by self.

## 2018-03-28 NOTE — PROGRESS NOTES
HISTORY OF PRESENT ILLNESS  Alicia Seals is a 45 y.o. male. 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 No problems with medications. He is not testing his glucose (needs battery). Depression is well-controlled on medications. His pain is well-controlled on current regimen. No recent kidney stones. He had a recent episode of stomatitis with fever. Hypertension    Pertinent negatives include no chest pain, no palpitations, no headaches, no shortness of breath, no nausea and no vomiting. Diabetes   Pertinent negatives include no chest pain, no headaches and no shortness of breath. Ear Fullness    Pertinent negatives include no headaches and no vomiting. Past Medical History:   Diagnosis Date    Abnormal nuclear cardiac imaging test 01/31/2012    Mod inferior infarction w/mild-mod simona-infarct ischemia. LVE. EF 43%. Marked basal inferior hypk; otherwise mild-mod inferior, inferolateral, distal anteroapical hypk. TID index 1.07. Pos max EST suggests subtotal obstruction of RCA.  Coronary artery disease     Inferior MI in 1998 status post RCA stent; ISR in 1999 status post rotational atherectomy. Posterior wall MI 2002 with circumflex stent.  Dyslipidemia     History of echocardiogram 10/09/2012    EF 50-55%. No WMA. Gr 1 DDfx. RVSP normal.  LAE.  IVCE.  Hyperlipidemia     Hypertension     mild    Non-insulin dependent diabetes mellitus     Renal duplex 12/02/2013    No significant RA stenosis. Patent bilateral renal veins w/o thrombosis.  S/P attempted coronary angioplasty 10/08/2012    Unsuccessful angioplasty of chronic RCA occlusion w/collaterals.  S/P cardiac cath 02/17/2012    %. ISR. LM patent. LAD patent. pD1 55%. mCX patent. OM1 80% (3 x 30 [place] stent o/lapped w/3 x 12 [place] stent; residual 0%). LVEDP 13.   EF 50-55%. Past Surgical History:   Procedure Laterality Date    HX APPENDECTOMY      HX UROLOGICAL Right     kidney stone    HX UROLOGICAL Bilateral     orchiopexy for torsion       History   Smoking Status    Former Smoker    Years: 20.00    Quit date: 1/24/2007   Smokeless Tobacco    Never Used     Current Outpatient Prescriptions   Medication Sig    sertraline (ZOLOFT) 50 mg tablet TAKE 1 TABLET BY MOUTH DAILY FOR MAJOR DEPRESSIVE DISORDER    HYDROcodone-acetaminophen (NORCO) 7.5-325 mg per tablet Take 1 Tab by mouth five (5) times daily. Max Daily Amount: 5 Tabs. Indications: Pain    traZODone (DESYREL) 100 mg tablet Take 1 Tab by mouth nightly. Indications: insomnia associated with depression    Blood-Glucose Meter monitoring kit Use once daily as directed.  glucose blood VI test strips (BLOOD GLUCOSE TEST) strip Use daily as directed - must match his glucometer.  Lancets misc Use daily as directed    dapagliflozin 10 mg tab Take 1 Tab by mouth daily. Indications: type 2 diabetes mellitus    amLODIPine (NORVASC) 10 mg tablet Take 1 Tab by mouth daily. Indications: hypertension    fenofibrate nanocrystallized (TRICOR) 145 mg tablet Take 1 Tab by mouth daily. Indications: hyperlipidemia    glipiZIDE (GLUCOTROL) 10 mg tablet Take 1 Tab by mouth two (2) times a day.  ramipril (ALTACE) 10 mg capsule Take 1 Cap by mouth daily. Indications: hypertension    clopidogrel (PLAVIX) 75 mg tab Take 1 Tab by mouth daily.  atorvastatin (LIPITOR) 40 mg tablet Take 1 Tab by mouth daily.  metFORMIN (GLUCOPHAGE) 1,000 mg tablet Take 1 Tab by mouth two (2) times daily (with meals). Indications: type 2 diabetes mellitus    hydroCHLOROthiazide (MICROZIDE) 12.5 mg capsule Take 1 Cap by mouth daily. Indications: hypertension    gabapentin (NEURONTIN) 800 mg tablet Take 1 Tab by mouth three (3) times daily.  Indications: NEUROPATHIC PAIN    aspirin delayed-release 81 mg tablet Take 81 mg by mouth daily.    MULTIVITAMIN PO Take 1 tablet by mouth every morning. No current facility-administered medications for this visit. Review of Systems   Constitutional: Negative for chills and fever. Respiratory: Negative for shortness of breath. Cardiovascular: Negative for chest pain, palpitations and leg swelling. Gastrointestinal: Negative for nausea and vomiting. Musculoskeletal: Positive for back pain. Neurological: Negative for tingling, focal weakness and headaches. Psychiatric/Behavioral: Negative for depression. The patient does not have insomnia. Visit Vitals    /85 (BP 1 Location: Right arm, BP Patient Position: Sitting)    Pulse 84    Temp 97.7 °F (36.5 °C) (Oral)    Resp 16    Ht 5' 11\" (1.803 m)    Wt 198 lb 6.4 oz (90 kg)    SpO2 96%    BMI 27.67 kg/m2       Physical Exam   Constitutional: He is oriented to person, place, and time. He appears well-developed and well-nourished. No distress. Neck: Neck supple. No thyromegaly present. Carotid bruit absent   Cardiovascular: Normal rate, regular rhythm and intact distal pulses. Exam reveals no gallop and no friction rub. No murmur heard. Pulmonary/Chest: Effort normal and breath sounds normal. No respiratory distress. Musculoskeletal: He exhibits no edema. Lymphadenopathy:     He has no cervical adenopathy. Neurological: He is alert and oriented to person, place, and time. Skin: Skin is warm and dry. Psychiatric: He has a normal mood and affect. His behavior is normal. Judgment and thought content normal.   Nursing note and vitals reviewed. ASSESSMENT and PLAN    ICD-10-CM ICD-9-CM    1. Essential hypertension with goal blood pressure less than 140/90 I10 401.9 hydroCHLOROthiazide (HYDRODIURIL) 25 mg tablet   2. Type 2 diabetes mellitus with peripheral neuropathy (HCC) E11.42 250.60      357.2    3.  Severe single current episode of major depressive disorder, without psychotic features ([facility] F32.2 296.23 sertraline (ZOLOFT) 50 mg tablet   4. Mixed hyperlipidemia E78.2 272.2 LIPID PANEL      METABOLIC PANEL, COMPREHENSIVE   5. Coronary artery disease involving native coronary artery of native heart without angina pectoris I25.10 414.01    6. Primary osteoarthritis involving multiple joints M15.0 715.09    7. Mild nonproliferative diabetic retinopathy of both eyes associated with type 2 diabetes mellitus, macular edema presence unspecified ([facility] E11.3293 250.50      362.04    8. Insomnia due to other mental disorder F51.05 300.9 traZODone (DESYREL) 100 mg tablet    F99 327.02      Follow-up Disposition:  Return in about 3 months (around 6/28/2018). the following changes in treatment are made: Increase HCTZ to 25mg every day to improve BP control. Refills as noted. lab results and schedule of future lab studies reviewed with patient  reviewed medications and side effects in detail  Plan of care reviewed - patient verbalize(s) understanding and agreement.

## 2018-03-28 NOTE — MR AVS SNAPSHOT
[address] [facility] [address] 
[phone] Patient: Mariah Cross MRN: [id number] :1965 Visit Information Date & Time Provider Department Dept. Phone Encounter #  
 3/28/2018 11:15 AM Yan Prater MD [employer] [phone] [id number] Follow-up Instructions Return in about 3 months (around 2018). Your Appointments 2018  2:20 PM  
Follow Up with [address]if[address], MD  
[facility] [place] ([facility]) Appt Note: 1 year follow up with an EKG  
 [facility] [address] [address] [address] [phone] [address] [place] [address] [address] Upcoming Health Maintenance Date Due  
 LIPID PANEL Q1 2017 HEMOGLOBIN A1C Q6M 2018 EYE EXAM RETINAL OR DILATED Q1 10/3/2018 FOOT EXAM Q1 2018 MICROALBUMIN Q1 2018 COLONOSCOPY 2019 DTaP/Tdap/Td series (2 - Td) 2026 Allergies as of 3/28/2018  Review Complete On: 3/28/2018 By: Yan Prater MD  
 No Known Allergies Current Immunizations  Reviewed on 3/28/2018 Name Date Pneumococcal Polysaccharide (PPSV-23) 2016  2:31 PM  
 Tdap 2016  1:44 PM  
  
 Reviewed by Dev Hurst LPN on  at 30:99 AM  
You Were Diagnosed With   
  
 Codes Comments Essential hypertension with goal blood pressure less than 140/90    -  Primary ICD-10-CM: I10 
ICD-9-CM: 401.9 Type 2 diabetes mellitus with peripheral neuropathy (HCC)     ICD-10-CM: E11.42 
ICD-9-CM: 250.60, 357.2 Severe single current episode of major depressive disorder, without psychotic features ([facility] [address]     ICD-10-CM: F32.2 ICD-9-CM: 296.23 Mixed hyperlipidemia     ICD-10-CM: E78.2 ICD-9-CM: 272.2  Coronary artery disease involving native coronary artery of native heart without angina pectoris     ICD-10-CM: I25.10 ICD-9-CM: 414.01 Primary osteoarthritis involving multiple joints     ICD-10-CM: M15.0 ICD-9-CM: 715.09 Mild nonproliferative diabetic retinopathy of both eyes associated with type 2 diabetes mellitus, macular edema presence unspecified ([facility].)     ICD-10-CM: Z73.8494 ICD-9-CM: 250.50, 362.04 Insomnia due to other mental disorder     ICD-10-CM: F51.05, F99 
ICD-9-CM: 300.9, 327.02 Vitals BP Pulse Temp Resp Height(growth percentile) Weight(growth percentile) 154/85 (BP 1 Location: Right arm, BP Patient Position: Sitting) 84 97.7 °F (36.5 °C) (Oral) 16 5' 11\" (1.803 m) 198 lb 6.4 oz (90 kg) SpO2 BMI Smoking Status 96% 27.67 kg/m2 Former Smoker Vitals History BMI and BSA Data Body Mass Index Body Surface Area  
 27.67 kg/m 2 2.12 m 2 Preferred Pharmacy Pharmacy Name Phone [facility], [address] [facility] [address] [phone] Your Updated Medication List  
  
   
This list is accurate as of 3/28/18 12:40 PM.  Always use your most recent med list. amLODIPine 10 mg tablet Commonly known as:  Cookie Boozer Take 1 Tab by mouth daily. Indications: hypertension  
  
 aspirin delayed-release 81 mg tablet Take 81 mg by mouth daily. atorvastatin 40 mg tablet Commonly known as:  LIPITOR Take 1 Tab by mouth daily. Blood-Glucose Meter monitoring kit Use once daily as directed. clopidogrel 75 mg Tab Commonly known as:  PLAVIX Take 1 Tab by mouth daily. dapagliflozin 10 mg Tab tablet Commonly known as:  [employer] Take 1 Tab by mouth daily. Indications: type 2 diabetes mellitus  
  
 fenofibrate nanocrystallized 145 mg tablet Commonly known as:  [employer] Take 1 Tab by mouth daily. Indications: hyperlipidemia  
  
 gabapentin 800 mg tablet Commonly known as:  NEURONTIN  
 Take 1 Tab by mouth three (3) times daily. Indications: NEUROPATHIC PAIN  
  
 glipiZIDE 10 mg tablet Commonly known as:  Nguyen Ponce Take 1 Tab by mouth two (2) times a day. glucose blood VI test strips strip Commonly known as:  blood glucose test  
Use daily as directed - must match his glucometer. hydroCHLOROthiazide 25 mg tablet Commonly known as:  HYDRODIURIL Take 1 Tab by mouth daily. Indications: hypertension HYDROcodone-acetaminophen 7.5-325 mg per tablet Commonly known as:  Gayla Chain Take 1 Tab by mouth five (5) times daily. Max Daily Amount: 5 Tabs. Indications: Pain Lancets Misc Use daily as directed  
  
 metFORMIN 1,000 mg tablet Commonly known as:  GLUCOPHAGE Take 1 Tab by mouth two (2) times daily (with meals). Indications: type 2 diabetes mellitus MULTIVITAMIN PO Take 1 tablet by mouth every morning. ramipril 10 mg capsule Commonly known as:  ALTACE Take 1 Cap by mouth daily. Indications: hypertension  
  
 sertraline 50 mg tablet Commonly known as:  ZOLOFT Take 1 Tab by mouth daily. Indications: major depressive disorder  
  
 traZODone 100 mg tablet Commonly known as:  Jemal Condon Take 1 Tab by mouth nightly. Indications: insomnia associated with depression Prescriptions Sent to Pharmacy Refills  
 hydroCHLOROthiazide (HYDRODIURIL) 25 mg tablet 3 Sig: Take 1 Tab by mouth daily. Indications: hypertension Class: Normal  
 Pharmacy: [address] [facility], [address] [facility] Ph #: [phone] Route: Oral  
 traZODone (DESYREL) 100 mg tablet 3 Sig: Take 1 Tab by mouth nightly. Indications: insomnia associated with depression Class: Normal  
 Pharmacy: [address] [facility], [address] [facility] Ph #: [phone] Route: Oral  
 sertraline (ZOLOFT) 50 mg tablet 3 Sig: Take 1 Tab by mouth daily. Indications: major depressive disorder  Class: Normal  
 Pharmacy: [address], [address] [facility] #: [phone] Route: Oral  
  
Follow-up Instructions Return in about 3 months (around 6/28/2018). To-Do List   
 Around 03/28/2018 Lab:  LIPID PANEL Around 03/28/2018 Lab:  METABOLIC PANEL, COMPREHENSIVE Introducing [facility]! Radha Enriqeuz introduces brick&mobile patient portal. Now you can access parts of your medical record, email your doctor's office, and request medication refills online. 1. In your internet browser, go to [email or web address] App [email or web address]. [email or web address]/[email or web address] App Store 2. Click on the First Time User? Click Here link in the Sign In box. You will see the New Member Sign Up page. 3. Enter your brick&mobile Access Code exactly as it appears below. You will not need to use this code after youve completed the sign-up process. If you do not sign up before the expiration date, you must request a new code. · brick&mobile Access Code: [email or web address] 
Expires: 6/26/2018 12:40 PM 
 
4. Enter the last four digits of your Social Security Number (xxxx) and Date of Birth (mm/dd/yyyy) as indicated and click Submit. You will be taken to the next sign-up page. 5. Create a brick&mobile ID. This will be your brick&mobile login ID and cannot be changed, so think of one that is secure and easy to remember. 6. Create a brick&mobile password. You can change your password at any time. 7. Enter your Password Reset Question and Answer. This can be used at a later time if you forget your password. 8. Enter your e-mail address. You will receive e-mail notification when new information is available in [address] 9. Click Sign Up. You can now view and download portions of your medical record. 10. Click the Download Summary menu link to download a portable copy of your medical information. If you have questions, please visit the Frequently Asked Questions section of the brick&mobile website.  Remember, brick&mobile is NOT to be used for urgent needs. For medical emergencies, dial 911. Now available from your iPhone and Android! Please provide this summary of care documentation to your next provider. Your primary care clinician is listed as Lola Aguero. If you have any questions after today's visit, please call [phone].

## 2018-04-25 ENCOUNTER — HOSPITAL ENCOUNTER (OUTPATIENT)
Dept: LAB | Age: 53
Discharge: HOME | End: 2018-04-25
Payer: COMMERCIAL

## 2018-04-25 DIAGNOSIS — Z79.891 LONG TERM (CURRENT) USE OF OPIATE ANALGESIC: ICD-10-CM

## 2018-04-25 PROCEDURE — 80361 OPIATES 1 OR MORE: CPT | Performed by: FAMILY MEDICINE

## 2018-04-25 PROCEDURE — 80307 DRUG TEST PRSMV CHEM ANLYZR: CPT | Performed by: FAMILY MEDICINE

## 2018-05-01 LAB
6MAM UR QL SCN: NEGATIVE NG/ML
AMPHETAMINE SCREEN, URINE, 734836: NEGATIVE NG/ML
BARBITURATES UR QL SCN: NEGATIVE NG/ML
BENZODIAZ UR QL: NEGATIVE NG/ML
BUPRENORPHINE, URINE: NEGATIVE NG/ML
BZE UR QL: NEGATIVE NG/ML
CANNABINOIDS UR QL SCN: NEGATIVE NG/ML
CREAT UR-MCNC: 31.9 MG/DL (ref 20–300)
EDDP UR QL: NEGATIVE NG/ML
ETHANOL UR-MCNC: NEGATIVE %
METHADONE UR QL SCN: NEGATIVE NG/ML
NITRITE UR QL STRIP: NEGATIVE MCG/ML
OPIATES UR QL SCN: NORMAL NG/ML
OPIATES UR QL: NEGATIVE NG/ML
OXYCODONE UR CFM-MCNC: 1950 NG/ML
OXYCODONE UR QL CFM: POSITIVE
OXYCODONE+OXYMORPHONE UR QL SCN: NORMAL NG/ML
OXYCODONE+OXYMORPHONE UR QL SCN: POSITIVE
OXYMORPHONE UR CFM-MCNC: 2390 NG/ML
OXYMORPHONE UR QL CFM: POSITIVE
PCP UR QL: NEGATIVE NG/ML
PH UR: 6 [PH] (ref 4.5–8.9)
PROPOXYPH UR QL: NEGATIVE NG/ML

## 2018-05-21 DIAGNOSIS — M15.9 PRIMARY OSTEOARTHRITIS INVOLVING MULTIPLE JOINTS: ICD-10-CM

## 2018-05-21 NOTE — TELEPHONE ENCOUNTER
Patient's last office visit on 3/28/2018  Medication(s) last filled on 4/25/2018  Next Appointment: 6/29/2018  Rx Class Print

## 2018-05-22 RX ORDER — HYDROCODONE BITARTRATE AND ACETAMINOPHEN 7.5; 325 MG/1; MG/1
1 TABLET ORAL
Qty: 150 TAB | Refills: 0 | Status: SHIPPED | OUTPATIENT
Start: 2018-05-22 | End: 2018-06-18 | Stop reason: SDUPTHER

## 2018-06-18 DIAGNOSIS — M15.9 PRIMARY OSTEOARTHRITIS INVOLVING MULTIPLE JOINTS: ICD-10-CM

## 2018-06-18 RX ORDER — HYDROCODONE BITARTRATE AND ACETAMINOPHEN 7.5; 325 MG/1; MG/1
1 TABLET ORAL
Qty: 150 TAB | Refills: 0 | Status: SHIPPED | OUTPATIENT
Start: 2018-06-18 | End: 2018-07-13 | Stop reason: SDUPTHER

## 2018-06-18 NOTE — TELEPHONE ENCOUNTER
Patient's last office visit on  3/28/2018  Medication(s) last filled on 5/22/2018  Next Appointment: 6/29/2018  Rx Class Print

## 2018-07-20 DIAGNOSIS — I10 ESSENTIAL HYPERTENSION WITH GOAL BLOOD PRESSURE LESS THAN 140/90: ICD-10-CM

## 2018-07-20 DIAGNOSIS — E78.5 HYPERLIPIDEMIA, UNSPECIFIED HYPERLIPIDEMIA TYPE: ICD-10-CM

## 2018-07-24 RX ORDER — FENOFIBRATE 145 MG/1
TABLET, COATED ORAL
Qty: 90 TAB | Refills: 3 | Status: SHIPPED | OUTPATIENT
Start: 2018-07-24 | End: 2019-08-23 | Stop reason: SDUPTHER

## 2018-07-24 RX ORDER — AMLODIPINE BESYLATE 10 MG/1
TABLET ORAL
Qty: 90 TAB | Refills: 3 | Status: SHIPPED | OUTPATIENT
Start: 2018-07-24 | End: 2019-11-05 | Stop reason: SDUPTHER

## 2018-08-03 ENCOUNTER — OFFICE VISIT (OUTPATIENT)
Dept: FAMILY MEDICINE CLINIC | Facility: CLINIC | Age: 53
End: 2018-08-03

## 2018-08-03 VITALS
BODY MASS INDEX: 27.58 KG/M2 | HEART RATE: 80 BPM | HEIGHT: 71 IN | RESPIRATION RATE: 18 BRPM | OXYGEN SATURATION: 94 % | WEIGHT: 197 LBS | SYSTOLIC BLOOD PRESSURE: 109 MMHG | TEMPERATURE: 97.6 F | DIASTOLIC BLOOD PRESSURE: 65 MMHG

## 2018-08-03 DIAGNOSIS — I10 ESSENTIAL HYPERTENSION WITH GOAL BLOOD PRESSURE LESS THAN 140/90: ICD-10-CM

## 2018-08-03 DIAGNOSIS — I25.10 CORONARY ARTERY DISEASE INVOLVING NATIVE CORONARY ARTERY OF NATIVE HEART WITHOUT ANGINA PECTORIS: ICD-10-CM

## 2018-08-03 DIAGNOSIS — E11.42 TYPE 2 DIABETES MELLITUS WITH PERIPHERAL NEUROPATHY (HCC): Primary | ICD-10-CM

## 2018-08-03 DIAGNOSIS — F32.2 SEVERE SINGLE CURRENT EPISODE OF MAJOR DEPRESSIVE DISORDER, WITHOUT PSYCHOTIC FEATURES (HCC): ICD-10-CM

## 2018-08-03 DIAGNOSIS — M15.9 PRIMARY OSTEOARTHRITIS INVOLVING MULTIPLE JOINTS: ICD-10-CM

## 2018-08-03 DIAGNOSIS — E11.29 MICROALBUMINURIA DUE TO TYPE 2 DIABETES MELLITUS (HCC): ICD-10-CM

## 2018-08-03 DIAGNOSIS — R80.9 MICROALBUMINURIA DUE TO TYPE 2 DIABETES MELLITUS (HCC): ICD-10-CM

## 2018-08-03 DIAGNOSIS — E78.5 HYPERLIPIDEMIA, UNSPECIFIED HYPERLIPIDEMIA TYPE: ICD-10-CM

## 2018-08-03 RX ORDER — CLOPIDOGREL BISULFATE 75 MG/1
75 TABLET ORAL DAILY
Qty: 90 TAB | Refills: 3 | Status: SHIPPED | OUTPATIENT
Start: 2018-08-03 | End: 2019-11-19 | Stop reason: SDUPTHER

## 2018-08-03 RX ORDER — GLIPIZIDE 10 MG/1
10 TABLET ORAL 2 TIMES DAILY
Qty: 180 TAB | Refills: 3 | Status: SHIPPED | OUTPATIENT
Start: 2018-08-03 | End: 2019-08-20 | Stop reason: CLARIF

## 2018-08-03 RX ORDER — METFORMIN HYDROCHLORIDE 1000 MG/1
1000 TABLET ORAL 2 TIMES DAILY WITH MEALS
Qty: 180 TAB | Refills: 3 | Status: SHIPPED | OUTPATIENT
Start: 2018-08-03 | End: 2019-10-18 | Stop reason: SDUPTHER

## 2018-08-03 RX ORDER — RAMIPRIL 10 MG/1
10 CAPSULE ORAL DAILY
Qty: 90 CAP | Refills: 3 | Status: SHIPPED | OUTPATIENT
Start: 2018-08-03 | End: 2019-10-18 | Stop reason: SDUPTHER

## 2018-08-03 RX ORDER — ATORVASTATIN CALCIUM 40 MG/1
40 TABLET, FILM COATED ORAL DAILY
Qty: 90 TAB | Refills: 3 | Status: SHIPPED | OUTPATIENT
Start: 2018-08-03 | End: 2019-11-19 | Stop reason: SDUPTHER

## 2018-08-03 NOTE — PROGRESS NOTES
Chief Complaint   Patient presents with    Hypertension    Diabetes       Pt preferred language for health care discussion is [place]. Is someone accompanying this pt? no    Is the patient using any DME equipment during OV? no    Pt currently taking Antiplatelet therapy? ASA    Depression Screening:  PHQ over the last two weeks 12/29/2017 6/20/2017 3/24/2017 8/3/2015   Little interest or pleasure in doing things Nearly every day Not at all Not at all Not at all   Feeling down, depressed, irritable, or hopeless Nearly every day Not at all Not at all Not at all   Total Score PHQ 2 6 0 0 0   Trouble falling or staying asleep, or sleeping too much Nearly every day - - -   Feeling tired or having little energy Nearly every day - - -   Poor appetite, weight loss, or overeating Not at all - - -   Feeling bad about yourself - or that you are a failure or have let yourself or your family down Nearly every day - - -   Trouble concentrating on things such as school, work, reading, or watching TV Nearly every day - - -   Moving or speaking so slowly that other people could have noticed; or the opposite being so fidgety that others notice Not at all - - -   Thoughts of being better off dead, or hurting yourself in some way Nearly every day - - -   PHQ 9 Score 21 - - -   How difficult have these problems made it for you to do your work, take care of your home and get along with others Somewhat difficult - - -       Learning Assessment:  Learning Assessment 8/1/2017 8/9/2016 4/7/2014   PRIMARY LEARNER Patient Patient Patient   HIGHEST LEVEL OF EDUCATION - PRIMARY LEARNER  - - [phone] [facility] CAREGIVER - - No   PRIMARY LANGUAGE ENGLISH ENGLISH ENGLISH   LEARNER PREFERENCE PRIMARY DEMONSTRATION DEMONSTRATION READING   ANSWERED BY Patient Patient patient   RELATIONSHIP SELF SELF SELF       Abuse Screening:  Abuse Screening Questionnaire 3/28/2018   Do you ever feel afraid of your partner?  N   Are you in a relationship with someone who physically or mentally threatens you? N   Is it safe for you to go home? Y       Health Maintenance reviewed, discussed with patient and ordered per Provider. Health Maintenance Due   Topic Date Due    LIPID PANEL Q1  02/05/2017    HEMOGLOBIN A1C Q6M  06/29/2018    Influenza Age 5 to Adult  08/01/2018    COLONOSCOPY  01/14/2019       Any and all required CK forms filled out by patient and faxed, confirmation received. Coordination of Care:  1. Have you been to the ER, urgent care clinic since your last visit? Hospitalized in the last 30 days? no    2. Have you seen or consulted any other health care providers outside of the [address] [place] [address] in the last 30 days? Include any pap smears or colon screening.  no

## 2018-08-03 NOTE — MR AVS SNAPSHOT
[address] [facility] [address] 
[phone] Patient: Alysia Carmona MRN: [id number] :1965 Visit Information Date & Time Provider Department Dept. Phone Encounter #  
 8/3/2018  2:15 PM Christine Lira MD [email or web address] [phone] [id number] Follow-up Instructions Return in about 3 months (around 11/3/2018). Your Appointments 10/9/2018  1:20 PM  
Follow Up with Jose Alberto Patel MD  
[facility] ([facility]) Appt Note: 1 year follow up with an EKG; l/m to r/s appt due to provider out of office-alb x2; 1 year follow up with an EKG  
 [facility] [address] [address] [address] [phone] [address] [place] [address] [address] Upcoming Health Maintenance Date Due  
 LIPID PANEL Q1 2017 HEMOGLOBIN A1C Q6M 2018 Influenza Age 5 to Adult 2018 COLONOSCOPY 2019 EYE EXAM RETINAL OR DILATED Q1 10/3/2018 FOOT EXAM Q1 2018 MICROALBUMIN Q1 2018 DTaP/Tdap/Td series (2 - Td) 2026 Allergies as of 8/3/2018  Review Complete On: 8/3/2018 By: Christine Lira MD  
 No Known Allergies Current Immunizations  Reviewed on 3/28/2018 Name Date Pneumococcal Polysaccharide (PPSV-23) 2016  2:31 PM  
 Tdap 2016  1:44 PM  
  
 Not reviewed this visit You Were Diagnosed With   
  
 Codes Comments Type 2 diabetes mellitus with peripheral neuropathy (HCC)    -  Primary ICD-10-CM: E11.42 
ICD-9-CM: 250.60, 357.2 Coronary artery disease involving native coronary artery of native heart without angina pectoris     ICD-10-CM: I25.10 ICD-9-CM: 414.01 Hyperlipidemia, unspecified hyperlipidemia type     ICD-10-CM: E78.5 ICD-9-CM: 272.4 Essential hypertension with goal blood pressure less than 140/90     ICD-10-CM: I10 
ICD-9-CM: 401.9 Severe single current episode of major depressive disorder, without psychotic features ([facility]     ICD-10-CM: F32.2 ICD-9-CM: 296.23 Primary osteoarthritis involving multiple joints     ICD-10-CM: M15.0 ICD-9-CM: 715.09 Microalbuminuria due to type 2 diabetes mellitus ([facility]     ICD-10-CM: E11.29, R80.9 ICD-9-CM: 250.00, 791.0 Vitals BP Pulse Temp Resp Height(growth percentile) Weight(growth percentile) 109/65 80 97.6 °F (36.4 °C) (Oral) 18 5' 11\" (1.803 m) 197 lb (89.4 kg) SpO2 BMI Smoking Status 94% 27.48 kg/m2 Former Smoker BMI and BSA Data Body Mass Index Body Surface Area  
 27.48 kg/m 2 2.12 m 2 Preferred Pharmacy Pharmacy Name Phone [facility], [address] [facility] [address] [phone] Your Updated Medication List  
  
   
This list is accurate as of 8/3/18  3:14 PM.  Always use your most recent med list. amLODIPine 10 mg tablet Commonly known as:  Ellis Mullet TAKE 1 TABLET DAILY  
  
 aspirin delayed-release 81 mg tablet Take 81 mg by mouth daily. atorvastatin 40 mg tablet Commonly known as:  LIPITOR Take 1 Tab by mouth daily. Blood-Glucose Meter monitoring kit Use once daily as directed. clopidogrel 75 mg Tab Commonly known as:  PLAVIX Take 1 Tab by mouth daily. dapagliflozin 10 mg Tab tablet Commonly known as:  [employer] Take 1 Tab by mouth daily. Indications: type 2 diabetes mellitus  
  
 fenofibrate nanocrystallized 145 mg tablet Commonly known as:  TRICOR  
TAKE 1 TABLET DAILY  
  
 gabapentin 800 mg tablet Commonly known as:  NEURONTIN Take 1 Tab by mouth three (3) times daily. Indications: NEUROPATHIC PAIN  
  
 glipiZIDE 10 mg tablet Commonly known as:  Supriya Dayhoff Take 1 Tab by mouth two (2) times a day. glucose blood VI test strips strip Commonly known as:  blood glucose test  
Use daily as directed - must match his glucometer. hydroCHLOROthiazide 25 mg tablet Commonly known as:  HYDRODIURIL Take 1 Tab by mouth daily. Indications: hypertension HYDROcodone-acetaminophen 7.5-325 mg per tablet Commonly known as:  [address] Na[address] Cole Take 1 Tab by mouth five (5) times daily. Max Daily Amount: 5 Tabs. Indications: Pain Lancets Misc Use daily as directed  
  
 metFORMIN 1,000 mg tablet Commonly known as:  GLUCOPHAGE Take 1 Tab by mouth two (2) times daily (with meals). Indications: type 2 diabetes mellitus MULTIVITAMIN PO Take 1 tablet by mouth every morning. ramipril 10 mg capsule Commonly known as:  ALTACE Take 1 Cap by mouth daily. Indications: hypertension  
  
 sertraline 50 mg tablet Commonly known as:  ZOLOFT Take 1 Tab by mouth daily. Indications: major depressive disorder  
  
 traZODone 100 mg tablet Commonly known as:  Oletta Lisa Take 1 Tab by mouth nightly. Indications: insomnia associated with depression Prescriptions Sent to Pharmacy Refills  
 glipiZIDE (GLUCOTROL) 10 mg tablet 3 Sig: Take 1 Tab by mouth two (2) times a day. Class: Normal  
 Pharmacy: [address] [place] [address] [address] [facility] Ph #: [phone] Route: Oral  
 clopidogrel (PLAVIX) 75 mg tab 3 Sig: Take 1 Tab by mouth daily. Class: Normal  
 Pharmacy: [address] [place] [address] [address] [facility] Ph #: [phone] Route: Oral  
 metFORMIN (GLUCOPHAGE) 1,000 mg tablet 3 Sig: Take 1 Tab by mouth two (2) times daily (with meals). Indications: type 2 diabetes mellitus Class: Normal  
 Pharmacy: [address] [place] [address] [address] [facility] Ph #: [phone] Route: Oral  
 atorvastatin (LIPITOR) 40 mg tablet 3 Sig: Take 1 Tab by mouth daily. Class: Normal  
 Pharmacy: [address] [place] [address], [address] [facility] Ph #: [phone]  Route: Oral  
 ramipril (ALTACE) 10 mg capsule 3  
 Sig: Take 1 Cap by mouth daily. Indications: hypertension Class: Normal  
 Pharmacy: [address], [address] [facility] #: [phone] Route: Oral  
  
Follow-up Instructions Return in about 3 months (around 11/3/2018). To-Do List   
 08/03/2018 Lab:  HEMOGLOBIN A1C WITH EAG   
  
 08/03/2018 Lab:  LIPID PANEL   
  
 08/03/2018 Lab:  METABOLIC PANEL, COMPREHENSIVE Introducing [facility]! Cliff Richards introduces pluriSelect patient portal. Now you can access parts of your medical record, email your doctor's office, and request medication refills online. 1. In your internet browser, go to [email or web address] 2. Click on the First Time User? Click Here link in the Sign In box. You will see the New Member Sign Up page. 3. Enter your pluriSelect Access Code exactly as it appears below. You will not need to use this code after youve completed the sign-up process. If you do not sign up before the expiration date, you must request a new code. · [employer]iSelect Access Code: [email or web address] Expires: 9/27/2018  5:18 AM 
 
4. Enter the last four digits of your Social Security Number (xxxx) and Date of Birth (mm/dd/yyyy) as indicated and click Submit. You will be taken to the next sign-up page. 5. Create a pluriSelect ID. This will be your pluriSelect login ID and cannot be changed, so think of one that is secure and easy to remember. 6. Create a [employer]iSelect password. You can change your password at any time. 7. Enter your Password Reset Question and Answer. This can be used at a later time if you forget your password. 8. Enter your e-mail address. You will receive e-mail notification when new information is available in [address] 9. Click Sign Up. You can now view and download portions of your medical record. 10. Click the Download Summary menu link to download a portable copy of your medical information. If you have questions, please visit the Frequently Asked Questions section of the Me[employer]t website. Remember, [employer] is NOT to be used for urgent needs. For medical emergencies, dial 911. Now available from your iPhone and Android! Please provide this summary of care documentation to your next provider. Your primary care clinician is listed as Omer Rodriguez. If you have any questions after today's visit, please call [phone].

## 2018-08-03 NOTE — PROGRESS NOTES
HISTORY OF PRESENT ILLNESS  Juan Sales is a 45 y.o. male. 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 Depression well-controlled, no chest pain. He doesn't measure his glucose at home. His pain is under good control on current regimen. Hypertension    Pertinent negatives include no chest pain, no palpitations, no headaches, no shortness of breath, no nausea and no vomiting. Diabetes   Pertinent negatives include no chest pain, no headaches and no shortness of breath. Past Medical History:   Diagnosis Date    Abnormal nuclear cardiac imaging test 01/31/2012    Mod inferior infarction w/mild-mod simona-infarct ischemia. LVE. EF 43%. Marked basal inferior hypk; otherwise mild-mod inferior, inferolateral, distal anteroapical hypk. TID index 1.07. Pos max EST suggests subtotal obstruction of RCA.  Coronary artery disease     Inferior MI in 1998 status post RCA stent; ISR in 1999 status post rotational atherectomy. Posterior wall MI 2002 with circumflex stent.  Dyslipidemia     History of echocardiogram 10/09/2012    EF 50-55%. No WMA. Gr 1 DDfx. RVSP normal.  LAE.  IVCE.  Hyperlipidemia     Hypertension     mild    Non-insulin dependent diabetes mellitus     Renal duplex 12/02/2013    No significant RA stenosis. Patent bilateral renal veins w/o thrombosis.  S/P attempted coronary angioplasty 10/08/2012    Unsuccessful angioplasty of chronic RCA occlusion w/collaterals.  S/P cardiac cath 02/17/2012    %. ISR. LM patent. LAD patent. pD1 55%. mCX patent. OM1 80% (3 x 30 [place] stent o/lapped w/3 x 12 [place] stent; residual 0%). LVEDP 13. EF 50-55%.          Past Surgical History:   Procedure Laterality Date    HX APPENDECTOMY      HX UROLOGICAL Right     kidney stone    HX UROLOGICAL Bilateral     orchiopexy for torsion History   Smoking Status    Former Smoker    Years: 20.00    Quit date: 1/24/2007   Smokeless Tobacco    Never Used     Current Outpatient Prescriptions   Medication Sig    amLODIPine (NORVASC) 10 mg tablet TAKE 1 TABLET DAILY    fenofibrate nanocrystallized (TRICOR) 145 mg tablet TAKE 1 TABLET DAILY    HYDROcodone-acetaminophen (NORCO) 7.5-325 mg per tablet Take 1 Tab by mouth five (5) times daily. Max Daily Amount: 5 Tabs. Indications: Pain    hydroCHLOROthiazide (HYDRODIURIL) 25 mg tablet Take 1 Tab by mouth daily. Indications: hypertension    traZODone (DESYREL) 100 mg tablet Take 1 Tab by mouth nightly. Indications: insomnia associated with depression    sertraline (ZOLOFT) 50 mg tablet Take 1 Tab by mouth daily. Indications: major depressive disorder    Blood-Glucose Meter monitoring kit Use once daily as directed.  glucose blood VI test strips (BLOOD GLUCOSE TEST) strip Use daily as directed - must match his glucometer.  Lancets misc Use daily as directed    dapagliflozin 10 mg tab Take 1 Tab by mouth daily. Indications: type 2 diabetes mellitus    glipiZIDE (GLUCOTROL) 10 mg tablet Take 1 Tab by mouth two (2) times a day.  ramipril (ALTACE) 10 mg capsule Take 1 Cap by mouth daily. Indications: hypertension    clopidogrel (PLAVIX) 75 mg tab Take 1 Tab by mouth daily.  atorvastatin (LIPITOR) 40 mg tablet Take 1 Tab by mouth daily.  metFORMIN (GLUCOPHAGE) 1,000 mg tablet Take 1 Tab by mouth two (2) times daily (with meals). Indications: type 2 diabetes mellitus    gabapentin (NEURONTIN) 800 mg tablet Take 1 Tab by mouth three (3) times daily. Indications: NEUROPATHIC PAIN    aspirin delayed-release 81 mg tablet Take 81 mg by mouth daily.  MULTIVITAMIN PO Take 1 tablet by mouth every morning. No current facility-administered medications for this visit. Review of Systems   Constitutional: Negative for chills and fever.    Respiratory: Negative for shortness of breath. Cardiovascular: Negative for chest pain, palpitations and leg swelling. Gastrointestinal: Negative for nausea and vomiting. Neurological: Negative for tingling, focal weakness and headaches. Psychiatric/Behavioral: The patient does not have insomnia. Visit Vitals    /65    Pulse 80    Temp 97.6 °F (36.4 °C) (Oral)    Resp 18    Ht 5' 11\" (1.803 m)    Wt 197 lb (89.4 kg)    SpO2 94%    BMI 27.48 kg/m2       Physical Exam   Constitutional: He is oriented to person, place, and time. He appears well-developed and well-nourished. No distress. Neck: Neck supple. No thyromegaly present. Carotid bruit absent   Cardiovascular: Normal rate, regular rhythm and intact distal pulses. Exam reveals no gallop and no friction rub. No murmur heard. Pulmonary/Chest: Effort normal and breath sounds normal. No respiratory distress. Musculoskeletal: He exhibits no edema. Lymphadenopathy:     He has no cervical adenopathy. Neurological: He is alert and oriented to person, place, and time. Skin: Skin is warm and dry. Psychiatric: He has a normal mood and affect. His behavior is normal. Judgment and thought content normal.   Nursing note and vitals reviewed. ASSESSMENT and PLAN    ICD-10-CM ICD-9-CM    1. Type 2 diabetes mellitus with peripheral neuropathy (HCC) E11.42 250.60 glipiZIDE (GLUCOTROL) 10 mg tablet     357.2 metFORMIN (GLUCOPHAGE) 1,000 mg tablet      HEMOGLOBIN A1C WITH EAG   2. Coronary artery disease involving native coronary artery of native heart without angina pectoris I25.10 414.01 clopidogrel (PLAVIX) 75 mg tab   3. Hyperlipidemia, unspecified hyperlipidemia type E78.5 272.4 atorvastatin (LIPITOR) 40 mg tablet      METABOLIC PANEL, COMPREHENSIVE      LIPID PANEL   4. Essential hypertension with goal blood pressure less than 140/90 I10 401.9 ramipril (ALTACE) 10 mg capsule   5.  Severe single current episode of major depressive disorder, without psychotic features ([facility] F32.2 296.23    6. Primary osteoarthritis involving multiple joints M15.0 715.09    7. Microalbuminuria due to type 2 diabetes mellitus ([facility] E11.29 250.00     R80.9 791.0      Follow-up Disposition:  Return in about 3 months (around 11/3/2018). current treatment plan is effective, no change in therapy - refills as noted. lab results and schedule of future lab studies reviewed with patient  reviewed medications and side effects in detail  Plan of care reviewed - patient verbalize(s) understanding and agreement.

## 2018-09-04 DIAGNOSIS — M15.9 PRIMARY OSTEOARTHRITIS INVOLVING MULTIPLE JOINTS: ICD-10-CM

## 2018-09-05 RX ORDER — HYDROCODONE BITARTRATE AND ACETAMINOPHEN 7.5; 325 MG/1; MG/1
1 TABLET ORAL
Qty: 150 TAB | Refills: 0 | Status: SHIPPED | OUTPATIENT
Start: 2018-09-05 | End: 2018-10-01 | Stop reason: SDUPTHER

## 2018-10-01 DIAGNOSIS — M15.9 PRIMARY OSTEOARTHRITIS INVOLVING MULTIPLE JOINTS: ICD-10-CM

## 2018-10-01 RX ORDER — HYDROCODONE BITARTRATE AND ACETAMINOPHEN 7.5; 325 MG/1; MG/1
1 TABLET ORAL
Qty: 150 TAB | Refills: 0 | Status: SHIPPED | OUTPATIENT
Start: 2018-10-03 | End: 2018-10-23 | Stop reason: SDUPTHER

## 2018-10-01 NOTE — TELEPHONE ENCOUNTER
Last seen  08/03/18  Next appt   11/05/18  Last filled    09/05/18    Requested Prescriptions     Pending Prescriptions Disp Refills    HYDROcodone-acetaminophen (NORCO) 7.5-325 mg per tablet 150 Tab 0     Sig: Take 1 Tab by mouth five (5) times daily. Max Daily Amount: 5 Tabs.  Indications: Pain

## 2018-10-09 ENCOUNTER — OFFICE VISIT (OUTPATIENT)
Dept: CARDIOLOGY CLINIC | Age: 53
End: 2018-10-09

## 2018-10-09 VITALS
WEIGHT: 201 LBS | DIASTOLIC BLOOD PRESSURE: 84 MMHG | BODY MASS INDEX: 28.14 KG/M2 | OXYGEN SATURATION: 98 % | HEIGHT: 71 IN | SYSTOLIC BLOOD PRESSURE: 142 MMHG | HEART RATE: 77 BPM

## 2018-10-09 DIAGNOSIS — I10 ESSENTIAL HYPERTENSION WITH GOAL BLOOD PRESSURE LESS THAN 140/90: ICD-10-CM

## 2018-10-09 DIAGNOSIS — I25.10 CORONARY ARTERY DISEASE INVOLVING NATIVE CORONARY ARTERY OF NATIVE HEART WITHOUT ANGINA PECTORIS: Primary | ICD-10-CM

## 2018-10-09 DIAGNOSIS — E78.5 HYPERLIPIDEMIA, UNSPECIFIED HYPERLIPIDEMIA TYPE: ICD-10-CM

## 2018-10-09 NOTE — MR AVS SNAPSHOT
[address] [place] [address] 
[phone] Patient: Meri Church MRN: [id number] :[id number] Visit Information Date & Time Provider Department Dept. Phone Encounter #  
 10/9/2018  1:20 PM Brice Gallagher MD [facility] [id number] Your Appointments 2018  2:30 PM  
ROUTINE CARE with Reji Alvarez MD  
[facility] Appt Note: 3 month f/u  
 [address] [facility] [address] [facility] [address]  
[phone]  
  
   
 [address] [facility] [address] [place] [address]  
  
    
 2019  1:00 PM  
Follow Up with Brice Gallagher MD  
[facility] ([facility]) Appt Note: 9-12- month f/up [address] [place] [id number] [phone] [facility] [place] [address] Upcoming Health Maintenance Date Due Shingrix Vaccine Age 50> (1 of 2) 2013 LIPID PANEL Q1 2017 HEMOGLOBIN A1C Q6M 2018 Influenza Age 5 to Adult 2018 EYE EXAM RETINAL OR DILATED Q1 10/3/2018 COLONOSCOPY 2019 FOOT EXAM Q1 2018 MICROALBUMIN Q1 2018 DTaP/Tdap/Td series (2 - Td) 2026 Allergies as of 10/9/2018  Review Complete On: 10/9/2018 By: Brice Gallagher MD  
 No Known Allergies Current Immunizations  Reviewed on 3/28/2018 Name Date Pneumococcal Polysaccharide (PPSV-23) 2016  2:31 PM  
 Tdap 2016  1:44 PM  
  
 Not reviewed this visit You Were Diagnosed With   
  
 Codes Comments Coronary artery disease involving native coronary artery of native heart without angina pectoris    -  Primary ICD-10-CM: I25.10 ICD-9-CM: 414.01 Essential hypertension with goal blood pressure less than 140/90     ICD-10-CM: I10 
ICD-9-CM: 401.9 Vitals BP Pulse Height(growth percentile) Weight(growth percentile) SpO2 BMI  
 142/84 77 5' 11\" (1.803 m) 201 lb (91.2 kg) 98% 28.03 kg/m2 Smoking Status Former Smoker Vitals History BMI and BSA Data Body Mass Index Body Surface Area 28.03 kg/m 2 2.14 m 2 Preferred Pharmacy Pharmacy Name [facility] [facility] [address] [phone] Your Updated Medication List  
  
   
This list is accurate as of 10/9/18  1:48 PM.  Always use your most recent med list. amLODIPine 10 mg tablet Commonly known as:  Lue John TAKE 1 TABLET DAILY  
  
 aspirin delayed-release 81 mg tablet Take 81 mg by mouth daily. atorvastatin 40 mg tablet Commonly known as:  LIPITOR Take 1 Tab by mouth daily. Blood-Glucose Meter monitoring kit Use once daily as directed. clopidogrel 75 mg Tab Commonly known as:  PLAVIX Take 1 Tab by mouth daily. dapagliflozin 10 mg Tab tablet Commonly known as:  [employer] Take 1 Tab by mouth daily. Indications: type 2 diabetes mellitus  
  
 fenofibrate nanocrystallized 145 mg tablet Commonly known as:  TRICOR  
TAKE 1 TABLET DAILY  
  
 gabapentin 800 mg tablet Commonly known as:  NEURONTIN Take 1 Tab by mouth three (3) times daily. Indications: NEUROPATHIC PAIN  
  
 glipiZIDE 10 mg tablet Commonly known as:  Maren Gondola Take 1 Tab by mouth two (2) times a day. glucose blood VI test strips strip Commonly known as:  blood glucose test  
Use daily as directed - must match his glucometer. hydroCHLOROthiazide 25 mg tablet Commonly known as:  HYDRODIURIL Take 1 Tab by mouth daily. Indications: hypertension HYDROcodone-acetaminophen 7.5-325 mg per tablet Commonly known as:  [address] Take 1 Tab by mouth five (5) times daily. Max Daily Amount: 5 Tabs. Indications: Pain Lancets Misc Use daily as directed metFORMIN 1,000 mg tablet Commonly known as:  GLUCOPHAGE Take 1 Tab by mouth two (2) times daily (with meals). Indications: type 2 diabetes mellitus MULTIVITAMIN PO Take 1 tablet by mouth every morning. ramipril 10 mg capsule Commonly known as:  ALTACE Take 1 Cap by mouth daily. Indications: hypertension  
  
 sertraline 50 mg tablet Commonly known as:  ZOLOFT Take 1 Tab by mouth daily. Indications: major depressive disorder  
  
 traZODone 100 mg tablet Commonly known as:  Jasmine Filbert Take 1 Tab by mouth nightly. Indications: insomnia associated with depression We Performed the Following AMB POC EKG ROUTINE W/ 12 LEADS, INTER & REP [64993 CPT(R)] Introducing [facility]! [employer] introduces [employer] patient portal. Now you can access parts of your medical record, email your doctor's office, and request medication refills online. 1. In your internet browser, go to [email or web address] 2. Click on the First Time User? Click Here link in the Sign In box. You will see the New Member Sign Up page. 3. Enter your [employer] Access Code exactly as it appears below. You will not need to use this code after youve completed the sign-up process. If you do not sign up before the expiration date, you must request a new code. · [employer] Access Code: [email or web address] Expires: 1/7/2019  1:48 PM 
 
4. Enter the last four digits of your Social Security Number (xxxx) and Date of Birth (mm/dd/yyyy) as indicated and click Submit. You will be taken to the next sign-up page. 5. Create a [employer] ID. This will be your [employer] login ID and cannot be changed, so think of one that is secure and easy to remember. 6. Create a [employer] password. You can change your password at any time. 7. Enter your Password Reset Question and Answer. This can be used at a later time if you forget your password. 8. Enter your e-mail address. You will receive e-mail notification when new information is available in [address] 9. Click Sign Up. You can now view and download portions of your medical record. 10. Click the Download Summary menu link to download a portable copy of your medical information. If you have questions, please visit the Frequently Asked Questions section of the [employer] website. Remember, [employer] is NOT to be used for urgent needs. For medical emergencies, dial 911. Now available from your iPhone and Android! Please provide this summary of care documentation to your next provider. Your primary care clinician is listed as Serina Antonio. If you have any questions after today's visit, please call [phone].

## 2018-10-09 NOTE — PROGRESS NOTES
HISTORY OF PRESENT ILLNESS  Reg Wilhelm is a 45 y.o. male. ASSESSMENT and PLAN    Mr. Reg Wilhelm has history of CAD. He has known occluded RCA noted back in 1998 with in-stent restenosis. He did Undergo rotational atherectomy in 1999 but subsequently had restenosis again. Back in October of 2012,  intervention was attempted without success. Patient was continued on medical therapy. He has significant first-degree AV block with IL interval of 300 ms.  CAD:   Symptomatically stable.  BP:   Well-controlled.  HR:    Stable. He has significant first-degree AV block.  CHF:   There is no evidence of decompensated CHF noted.  Weight:   His weight today is 201 pounds. His baseline weight is 200 pounds. I encouraged him to try to lose about 10 pounds.  Cholesterol:   Target LDL <70. Lipitor 40 and TriCor 145.  Anti-platelet:   Remains on ASA. I will see him back in 12 months. Thank you. Encounter Diagnoses   Name Primary?  Coronary artery disease involving native coronary artery of native heart without angina pectoris Yes    Essential hypertension with goal blood pressure less than 140/90     Hyperlipidemia, unspecified hyperlipidemia type      current treatment plan is effective, no change in therapy  lab results and schedule of future lab studies reviewed with patient  reviewed diet, exercise and weight control  cardiovascular risk and specific lipid/LDL goals reviewed  use of aspirin to prevent MI and TIA's discussed      HPI  Today, Mr. Matt Bell has no complaints of chest pains, increased shortness of breath or decreased exercise capacity. He denies any orthopnea or PND. He denies any palpitations or dizziness. He remains active physically but working out on a regular basis. He has not noted any recent changes in his activity levels. Review of Systems   Respiratory: Negative for shortness of breath.     Cardiovascular: Negative for chest pain, palpitations, orthopnea, claudication, leg swelling and PND. All other systems reviewed and are negative. Physical Exam   Constitutional: He is oriented to person, place, and time. He appears well-developed and well-nourished. HENT:   Head: Normocephalic. Eyes: Conjunctivae are normal.   Neck: Neck supple. No JVD present. Carotid bruit is not present. No thyromegaly present. Cardiovascular: Normal rate and regular rhythm. Pulmonary/Chest: Breath sounds normal.   Abdominal: Bowel sounds are normal.   Musculoskeletal: He exhibits no edema. Neurological: He is alert and oriented to person, place, and time. Skin: Skin is warm and dry. Nursing note and vitals reviewed. PCP: Shaila Kendall MD    Past Medical History:   Diagnosis Date    Abnormal nuclear cardiac imaging test 01/31/2012    Mod inferior infarction w/mild-mod simona-infarct ischemia. LVE. EF 43%. Marked basal inferior hypk; otherwise mild-mod inferior, inferolateral, distal anteroapical hypk. TID index 1.07. Pos max EST suggests subtotal obstruction of RCA.  Coronary artery disease     Inferior MI in 1998 status post RCA stent; ISR in 1999 status post rotational atherectomy. Posterior wall MI 2002 with circumflex stent.  Dyslipidemia     History of echocardiogram 10/09/2012    EF 50-55%. No WMA. Gr 1 DDfx. RVSP normal.  LAE.  IVCE.  Hyperlipidemia     Hypertension     mild    Non-insulin dependent diabetes mellitus     Renal duplex 12/02/2013    No significant RA stenosis. Patent bilateral renal veins w/o thrombosis.  S/P attempted coronary angioplasty 10/08/2012    Unsuccessful angioplasty of chronic RCA occlusion w/collaterals.  S/P cardiac cath 02/17/2012    %. ISR. LM patent. LAD patent. pD1 55%. mCX patent. OM1 80% (3 x 30 [place] stent o/lapped w/3 x 12 [place] stent; residual 0%). LVEDP 13. EF 50-55%.          Past Surgical History:   Procedure Laterality Date    HX APPENDECTOMY      HX UROLOGICAL Right     kidney stone    HX UROLOGICAL Bilateral     orchiopexy for torsion       Current Outpatient Prescriptions   Medication Sig Dispense Refill    HYDROcodone-acetaminophen (NORCO) 7.5-325 mg per tablet Take 1 Tab by mouth five (5) times daily. Max Daily Amount: 5 Tabs. Indications: Pain 150 Tab 0    glipiZIDE (GLUCOTROL) 10 mg tablet Take 1 Tab by mouth two (2) times a day. 180 Tab 3    clopidogrel (PLAVIX) 75 mg tab Take 1 Tab by mouth daily. 90 Tab 3    metFORMIN (GLUCOPHAGE) 1,000 mg tablet Take 1 Tab by mouth two (2) times daily (with meals). Indications: type 2 diabetes mellitus 180 Tab 3    atorvastatin (LIPITOR) 40 mg tablet Take 1 Tab by mouth daily. 90 Tab 3    ramipril (ALTACE) 10 mg capsule Take 1 Cap by mouth daily. Indications: hypertension 90 Cap 3    amLODIPine (NORVASC) 10 mg tablet TAKE 1 TABLET DAILY 90 Tab 3    fenofibrate nanocrystallized (TRICOR) 145 mg tablet TAKE 1 TABLET DAILY 90 Tab 3    hydroCHLOROthiazide (HYDRODIURIL) 25 mg tablet Take 1 Tab by mouth daily. Indications: hypertension 90 Tab 3    traZODone (DESYREL) 100 mg tablet Take 1 Tab by mouth nightly. Indications: insomnia associated with depression 90 Tab 3    sertraline (ZOLOFT) 50 mg tablet Take 1 Tab by mouth daily. Indications: major depressive disorder 90 Tab 3    Blood-Glucose Meter monitoring kit Use once daily as directed. 1 Kit 0    glucose blood VI test strips (BLOOD GLUCOSE TEST) strip Use daily as directed - must match his glucometer. 100 Strip 3    Lancets misc Use daily as directed 100 Each 3    gabapentin (NEURONTIN) 800 mg tablet Take 1 Tab by mouth three (3) times daily. Indications: NEUROPATHIC PAIN 270 Tab 3    aspirin delayed-release 81 mg tablet Take 81 mg by mouth daily.  dapagliflozin 10 mg tab Take 1 Tab by mouth daily. Indications: type 2 diabetes mellitus 90 Tab 3    MULTIVITAMIN PO Take 1 tablet by mouth every morning.          The patient has a family history of    Social History   Substance Use Topics    Smoking status: Former Smoker     Years: 20.00     Quit date: 1/24/2007    Smokeless tobacco: Never Used    Alcohol use No       Lab Results   Component Value Date/Time    Cholesterol, total 192 02/05/2016 09:10 AM    HDL Cholesterol 28 (L) 02/05/2016 09:10 AM    LDL,Direct 61 02/26/2010 10:34 AM    LDL, calculated 102.8 (H) 02/05/2016 09:10 AM    Triglyceride 306 (H) 02/05/2016 09:10 AM    CHOL/HDL Ratio 6.9 (H) 02/05/2016 09:10 AM        BP Readings from Last 3 Encounters:   10/09/18 142/84   08/03/18 109/65   03/28/18 154/85        Pulse Readings from Last 3 Encounters:   10/09/18 77   08/03/18 80   03/28/18 84       Wt Readings from Last 3 Encounters:   10/09/18 91.2 kg (201 lb)   08/03/18 89.4 kg (197 lb)   03/28/18 90 kg (198 lb 6.4 oz)         EKG: unchanged from previous tracings, normal sinus rhythm, Q waves in 3 and aVF, 1st degree AV block.

## 2018-10-09 NOTE — PROGRESS NOTES
Ildefonso Ordaz presents today for 1 year follow up    Ildefonso Ordaz preferred language for health care discussion is english/other. Is someone accompanying this pt? No    Is the patient using any DME equipment during OV? No    Depression Screening:  PHQ over the last two weeks 12/29/2017   Little interest or pleasure in doing things Nearly every day   Feeling down, depressed, irritable, or hopeless Nearly every day   Total Score PHQ 2 6   Trouble falling or staying asleep, or sleeping too much Nearly every day   Feeling tired or having little energy Nearly every day   Poor appetite, weight loss, or overeating Not at all   Feeling bad about yourself - or that you are a failure or have let yourself or your family down Nearly every day   Trouble concentrating on things such as school, work, reading, or watching TV Nearly every day   Moving or speaking so slowly that other people could have noticed; or the opposite being so fidgety that others notice Not at all   Thoughts of being better off dead, or hurting yourself in some way Nearly every day   PHQ 9 Score 21   How difficult have these problems made it for you to do your work, take care of your home and get along with others Somewhat difficult       Learning Assessment:  Learning Assessment 8/1/2017   PRIMARY LEARNER Patient   HIGHEST LEVEL OF EDUCATION - PRIMARY LEARNER  -   CO-LEARNER CAREGIVER -   PRIMARY LANGUAGE ENGLISH   LEARNER PREFERENCE PRIMARY DEMONSTRATION   ANSWERED BY Patient   RELATIONSHIP SELF       Abuse Screening:  Abuse Screening Questionnaire 3/28/2018   Do you ever feel afraid of your partner? N   Are you in a relationship with someone who physically or mentally threatens you? N   Is it safe for you to go home? Y         Pt currently taking Antiplatelet therapy? Yes    Coordination of Care:  1. Have you been to the ER, urgent care clinic since your last visit? Hospitalized since your last visit? No    2.  Have you seen or consulted any other health care providers outside of the [address] [place] [address] since your last visit? Include any pap smears or colon screening. No      Pt verbally verified medications.

## 2018-10-23 DIAGNOSIS — M15.9 PRIMARY OSTEOARTHRITIS INVOLVING MULTIPLE JOINTS: ICD-10-CM

## 2018-10-23 DIAGNOSIS — Z79.891 LONG TERM (CURRENT) USE OF OPIATE ANALGESIC: Primary | ICD-10-CM

## 2018-10-23 RX ORDER — HYDROCODONE BITARTRATE AND ACETAMINOPHEN 7.5; 325 MG/1; MG/1
1 TABLET ORAL
Qty: 150 TAB | Refills: 0 | Status: SHIPPED | OUTPATIENT
Start: 2018-10-30 | End: 2018-11-26 | Stop reason: DRUGHIGH

## 2018-10-23 NOTE — TELEPHONE ENCOUNTER
Last seen  08/03/18  Next appt   11/05/18  Last filled   10/03/18    Requested Prescriptions     Pending Prescriptions Disp Refills    HYDROcodone-acetaminophen (NORCO) 7.5-325 mg per tablet 150 Tab 0     Sig: Take 1 Tab by mouth five (5) times daily. Max Daily Amount: 5 Tabs.

## 2018-10-24 NOTE — TELEPHONE ENCOUNTER
reviewed, no worrisome findings (though this requests is a few days early). I will write this for 10/30/18. Needs UDS with this pickup.

## 2018-11-20 DIAGNOSIS — M15.9 PRIMARY OSTEOARTHRITIS INVOLVING MULTIPLE JOINTS: ICD-10-CM

## 2018-11-20 RX ORDER — HYDROCODONE BITARTRATE AND ACETAMINOPHEN 7.5; 325 MG/1; MG/1
1 TABLET ORAL
Qty: 150 TAB | Refills: 0 | OUTPATIENT
Start: 2018-11-20

## 2018-11-20 NOTE — TELEPHONE ENCOUNTER
Last seen  08/03/18  Next appt   None  Last filled   10/30/18    Requested Prescriptions     Pending Prescriptions Disp Refills    HYDROcodone-acetaminophen (NORCO) 7.5-325 mg per tablet 150 Tab 0     Sig: Take 1 Tab by mouth five (5) times daily. Max Daily Amount: 5 Tabs.

## 2018-11-21 NOTE — TELEPHONE ENCOUNTER
Chart review reveals undisclosed and unprescribed medications in his urine. He must be seen to discuss this before any consideration of refill. He should not be running out of medication until 11/28 based on previous prescription dates.

## 2018-11-26 ENCOUNTER — OFFICE VISIT (OUTPATIENT)
Dept: FAMILY MEDICINE CLINIC | Facility: CLINIC | Age: 53
End: 2018-11-26

## 2018-11-26 VITALS
OXYGEN SATURATION: 94 % | RESPIRATION RATE: 18 BRPM | TEMPERATURE: 96.1 F | SYSTOLIC BLOOD PRESSURE: 141 MMHG | HEIGHT: 71 IN | HEART RATE: 69 BPM | WEIGHT: 197 LBS | BODY MASS INDEX: 27.58 KG/M2 | DIASTOLIC BLOOD PRESSURE: 80 MMHG

## 2018-11-26 DIAGNOSIS — M15.9 PRIMARY OSTEOARTHRITIS INVOLVING MULTIPLE JOINTS: Primary | ICD-10-CM

## 2018-11-26 RX ORDER — HYDROCODONE BITARTRATE AND ACETAMINOPHEN 10; 325 MG/1; MG/1
1 TABLET ORAL
Qty: 120 TAB | Refills: 0 | Status: SHIPPED | OUTPATIENT
Start: 2018-11-26 | End: 2018-12-19 | Stop reason: SDUPTHER

## 2018-11-26 NOTE — PROGRESS NOTES
HISTORY OF PRESENT ILLNESS  Jay Moya is a 52 y.o. male. Presents to discuss the results of his last drug screen. He admits that he borrowed Percocet from a relative at the end of October, because he is taking more pills than are prescribed sometimes. He says that his current dose is not as effective as it was in the past. He was cautioned last December about taking outside medications, and he is aware that he is not supposed to increase his medication on his own. Review of Systems   Constitutional: Negative for chills and fever. Respiratory: Negative for shortness of breath. Cardiovascular: Negative for chest pain. Musculoskeletal: Positive for back pain and joint pain. Visit Vitals  /80   Pulse 69   Temp 96.1 °F (35.6 °C) (Oral)   Resp 18   Ht 5' 11\" (1.803 m)   Wt 197 lb (89.4 kg)   SpO2 94%   BMI 27.48 kg/m²       Physical Exam   Constitutional: He is oriented to person, place, and time. He appears well-developed and well-nourished. No distress. Neck: Neck supple. Carotid bruit is not present. No thyromegaly present. Cardiovascular: Normal rate and regular rhythm. Exam reveals no gallop and no friction rub. No murmur heard. Pulmonary/Chest: Effort normal and breath sounds normal. No respiratory distress. Lymphadenopathy:     He has no cervical adenopathy. Neurological: He is alert and oriented to person, place, and time. Skin: Skin is warm and dry. Psychiatric: He has a normal mood and affect. His behavior is normal. Judgment and thought content normal.       ASSESSMENT and PLAN    ICD-10-CM ICD-9-CM    1. Primary osteoarthritis involving multiple joints M15.0 715.09 HYDROcodone-acetaminophen (NORCO)  mg tablet     Follow-up Disposition:  Return as planned. the following changes in treatment are made: Full and clifton discussion of consequences of any further self-medication.  I will increase Norco to 10/325mg every 6 hours, and monitor closely for compliance. lab results and schedule of future lab studies reviewed with patient  reviewed medications and side effects in detail  Encouraged to get his labs drawn, and to set an eye appointment ASAP  Plan of care reviewed - patient verbalize(s) understanding and agreement.

## 2018-12-19 DIAGNOSIS — M15.9 PRIMARY OSTEOARTHRITIS INVOLVING MULTIPLE JOINTS: ICD-10-CM

## 2018-12-19 RX ORDER — HYDROCODONE BITARTRATE AND ACETAMINOPHEN 10; 325 MG/1; MG/1
1 TABLET ORAL
Qty: 120 TAB | Refills: 0 | Status: SHIPPED | OUTPATIENT
Start: 2018-12-24 | End: 2019-01-16 | Stop reason: SDUPTHER

## 2018-12-19 NOTE — TELEPHONE ENCOUNTER
Last seen  11/26/18  Next appt   12/21/18  Last filled    11/26/18    Requested Prescriptions     Pending Prescriptions Disp Refills    HYDROcodone-acetaminophen (NORCO)  mg tablet 120 Tab 0     Sig: Take 1 Tab by mouth every six (6) hours as needed for Pain. Max Daily Amount: 4 Tabs.

## 2018-12-21 ENCOUNTER — OFFICE VISIT (OUTPATIENT)
Dept: FAMILY MEDICINE CLINIC | Facility: CLINIC | Age: 53
End: 2018-12-21

## 2018-12-21 VITALS
HEIGHT: 71 IN | WEIGHT: 197 LBS | DIASTOLIC BLOOD PRESSURE: 82 MMHG | OXYGEN SATURATION: 95 % | BODY MASS INDEX: 27.58 KG/M2 | HEART RATE: 78 BPM | SYSTOLIC BLOOD PRESSURE: 138 MMHG | TEMPERATURE: 97.4 F | RESPIRATION RATE: 16 BRPM

## 2018-12-21 DIAGNOSIS — Z12.11 COLON CANCER SCREENING: ICD-10-CM

## 2018-12-21 DIAGNOSIS — I10 ESSENTIAL HYPERTENSION WITH GOAL BLOOD PRESSURE LESS THAN 140/90: ICD-10-CM

## 2018-12-21 DIAGNOSIS — R80.9 MICROALBUMINURIA DUE TO TYPE 2 DIABETES MELLITUS (HCC): ICD-10-CM

## 2018-12-21 DIAGNOSIS — E11.42 TYPE 2 DIABETES MELLITUS WITH PERIPHERAL NEUROPATHY (HCC): Primary | ICD-10-CM

## 2018-12-21 DIAGNOSIS — E78.5 HYPERLIPIDEMIA, UNSPECIFIED HYPERLIPIDEMIA TYPE: ICD-10-CM

## 2018-12-21 DIAGNOSIS — E11.29 MICROALBUMINURIA DUE TO TYPE 2 DIABETES MELLITUS (HCC): ICD-10-CM

## 2018-12-21 DIAGNOSIS — I25.10 CORONARY ARTERY DISEASE INVOLVING NATIVE CORONARY ARTERY OF NATIVE HEART WITHOUT ANGINA PECTORIS: ICD-10-CM

## 2018-12-21 DIAGNOSIS — M15.9 PRIMARY OSTEOARTHRITIS INVOLVING MULTIPLE JOINTS: ICD-10-CM

## 2018-12-21 DIAGNOSIS — F32.2 SEVERE SINGLE CURRENT EPISODE OF MAJOR DEPRESSIVE DISORDER, WITHOUT PSYCHOTIC FEATURES (HCC): ICD-10-CM

## 2018-12-21 LAB
HBA1C MFR BLD HPLC: 11.2 %
MICROALBUMIN UR TEST STR-MCNC: 80 MG/L
MICROALBUMIN/CREAT RATIO POC: NORMAL MG/G

## 2018-12-21 RX ORDER — GABAPENTIN 800 MG/1
800 TABLET ORAL 3 TIMES DAILY
Qty: 270 TAB | Refills: 3 | Status: SHIPPED | OUTPATIENT
Start: 2018-12-21 | End: 2019-07-23 | Stop reason: SDUPTHER

## 2018-12-21 NOTE — PROGRESS NOTES
HISTORY OF PRESENT ILLNESS  Jero Wild is a 52 y.o. male. Seen in follow-up for HTN, type 2 diabetes with neuropathy/retinopathy/microalbuminuria, CAD (with stents), kidney stones, hyperlipidemia, depression, chronic pain. No problems with medications. He is overdue for some labs. He feels that his diet hasn't been very good lately, and his activity could be boosted somewhat. He is taking all his medications. He is due for a foot exam, eye exam and colonoscopy. He has noticed a lesion on his left foot for the past week - he found this accidentally. No pain with this, without any history of injury. Review of Systems   Constitutional: Negative for chills and fever. Respiratory: Negative for shortness of breath. Cardiovascular: Negative for chest pain, palpitations and leg swelling. Gastrointestinal: Negative for nausea and vomiting. Neurological: Negative for tingling, focal weakness and headaches. Psychiatric/Behavioral: The patient does not have insomnia. Visit Vitals  /82   Pulse 78   Temp 97.4 °F (36.3 °C) (Oral)   Resp 16   Ht 5' 11\" (1.803 m)   Wt 197 lb (89.4 kg)   SpO2 95%   BMI 27.48 kg/m²       Physical Exam   Constitutional: He is oriented to person, place, and time. He appears well-developed and well-nourished. No distress. Neck: Neck supple. No thyromegaly present. Carotid bruit absent   Cardiovascular: Normal rate, regular rhythm and intact distal pulses. Exam reveals no gallop and no friction rub. No murmur heard. Pulmonary/Chest: Effort normal and breath sounds normal. No respiratory distress. Musculoskeletal: He exhibits no edema. Diabetic foot exam: Left: Filament test normal sensation with micro filament                 Right: Filament test normal sensation with micro filament  1 cm clean abrasion on left lateral foot        Lymphadenopathy:     He has no cervical adenopathy. Neurological: He is alert and oriented to person, place, and time.    Skin: Skin is warm and dry. Psychiatric: He has a normal mood and affect. His behavior is normal. Judgment and thought content normal.   Nursing note and vitals reviewed. Recent Results (from the past 12 hour(s))   AMB POC HEMOGLOBIN A1C    Collection Time: 12/21/18 10:44 AM   Result Value Ref Range    Hemoglobin A1c (POC) 11.2 %   AMB POC URINE, MICROALBUMIN, SEMIQUANTITATIVE    Collection Time: 12/21/18 10:44 AM   Result Value Ref Range    Microalbumin urine (POC) 80 MG/L    Microalbumin/creat ratio (POC)  <30 MG/G       ASSESSMENT and PLAN    ICD-10-CM ICD-9-CM    1. Type 2 diabetes mellitus with peripheral neuropathy (HCC) E11.42 250.60 gabapentin (NEURONTIN) 800 mg tablet     357.2 AMB POC HEMOGLOBIN A1C      AMB POC URINE, MICROALBUMIN, SEMIQUANTITATIVE       DIABETES FOOT EXAM   2. Essential hypertension with goal blood pressure less than 140/90 I10 401.9    3. Microalbuminuria due to type 2 diabetes mellitus (HCC) E11.29 250.00     R80.9 791.0    4. Hyperlipidemia, unspecified hyperlipidemia type E78.5 272.4    5. Coronary artery disease involving native coronary artery of native heart without angina pectoris I25.10 414.01    6. Severe single current episode of major depressive disorder, without psychotic features [facility] F32.2 296.23    7. Primary osteoarthritis involving multiple joints M15.0 715.09    8. Colon cancer screening Z12.11 V76.51 REFERRAL TO COLON AND RECTAL SURGERY     Follow-up Disposition:  Return in about 3 months (around 3/21/2019). the following changes in treatment are made: He will pay close attention to diet and exercise over the next 3 months. Refill Gabapentin. Cautioned to look carefully at his feet daily (as he developed an ulcer without being aware of it). Referral for colonoscopy.  Advised to schedule eye exam.  lab results and schedule of future lab studies reviewed with patient  reviewed medications and side effects in detail  Plan of care reviewed - patient verbalize(s) understanding and agreement.

## 2019-01-08 ENCOUNTER — OFFICE VISIT (OUTPATIENT)
Dept: SURGERY | Age: 54
End: 2019-01-08

## 2019-01-08 VITALS
HEART RATE: 74 BPM | TEMPERATURE: 98.4 F | BODY MASS INDEX: 27.86 KG/M2 | RESPIRATION RATE: 18 BRPM | SYSTOLIC BLOOD PRESSURE: 114 MMHG | DIASTOLIC BLOOD PRESSURE: 76 MMHG | HEIGHT: 71 IN | WEIGHT: 199 LBS | OXYGEN SATURATION: 96 %

## 2019-01-08 DIAGNOSIS — Z12.11 ENCOUNTER FOR SCREENING COLONOSCOPY: Primary | ICD-10-CM

## 2019-01-08 NOTE — PROGRESS NOTES
Colon Screen    Patient: Jules Reason MRN: [id number]  SSN: [id number]    YOB: 1965  Age: 52 y.o. Sex: male        Subjective:   [place] Reason was referred by Dr. Julien ref. provider found. PCP is Myron Lockwood MD.  Patient referred for colonoscopy for   Screening colonoscopy. Patient denies rectal pain or bleeding. Abdominal surgeries as described below, specifically appendectomy  Family history as described below, specifically none. Last colonoscopy was 10 years ago. Doesn't remember name of physcian    No Known Allergies    Past Medical History:   Diagnosis Date    Abnormal nuclear cardiac imaging test 01/31/2012    Mod inferior infarction w/mild-mod simona-infarct ischemia. LVE. EF 43%. Marked basal inferior hypk; otherwise mild-mod inferior, inferolateral, distal anteroapical hypk. TID index 1.07. Pos max EST suggests subtotal obstruction of RCA.  Calculus of kidney     Coronary artery disease     Inferior MI in 1998 status post RCA stent; ISR in 1999 status post rotational atherectomy. Posterior wall MI 2002 with circumflex stent.  Dyslipidemia     History of echocardiogram 10/09/2012    EF 50-55%. No WMA. Gr 1 DDfx. RVSP normal.  LAE.  IVCE.  Hyperlipidemia     Hypertension     mild    Long term current use of anticoagulant therapy     Non-insulin dependent diabetes mellitus     Renal duplex 12/02/2013    No significant RA stenosis. Patent bilateral renal veins w/o thrombosis.  S/P attempted coronary angioplasty 10/08/2012    Unsuccessful angioplasty of chronic RCA occlusion w/collaterals.  S/P cardiac cath 02/17/2012    %. ISR. LM patent. LAD patent. pD1 55%. mCX patent. OM1 80% (3 x 30 [place] stent o/lapped w/3 x 12 [place] stent; residual 0%). LVEDP 13. EF 50-55%.        Past Surgical History:   Procedure Laterality Date    CARDIAC SURG PROCEDURE UNLIST      stents x3    HX APPENDECTOMY      HX UROLOGICAL Right kidney stone    HX UROLOGICAL Bilateral     orchiopexy for torsion      Family History   Problem Relation Age of Onset    Heart Disease Mother     Diabetes Father     Heart Disease Maternal Grandfather      Social History     Tobacco Use    Smoking status: Former Smoker     Years: 20.00     Last attempt to quit: 2007     Years since quittin.9    Smokeless tobacco: Never Used   Substance Use Topics    Alcohol use: No      Prior to Admission medications    Medication Sig Start Date End Date Taking? Authorizing Provider   gabapentin (NEURONTIN) 800 mg tablet Take 1 Tab by mouth three (3) times daily. 18  Yes Pierre Crawford MD   HYDROcodone-acetaminophen [facility])  mg tablet Take 1 Tab by mouth every six (6) hours as needed for Pain. Max Daily Amount: 4 Tabs. 18  Yes Pierre Crawford MD   glipiZIDE (GLUCOTROL) 10 mg tablet Take 1 Tab by mouth two (2) times a day. 8/3/18  Yes Pierre Crawford MD   clopidogrel (PLAVIX) 75 mg tab Take 1 Tab by mouth daily. 8/3/18  Yes Pierre Crawford MD   metFORMIN (GLUCOPHAGE) 1,000 mg tablet Take 1 Tab by mouth two (2) times daily (with meals). Indications: type 2 diabetes mellitus 8/3/18  Yes Pierre Crawford MD   atorvastatin (LIPITOR) 40 mg tablet Take 1 Tab by mouth daily. 8/3/18  Yes Pierre Crawford MD   ramipril (ALTACE) 10 mg capsule Take 1 Cap by mouth daily. Indications: hypertension 8/3/18  Yes Pierre Crawford MD   amLODIPine (NORVASC) 10 mg tablet TAKE 1 TABLET DAILY 18  Yes Pierre Crawford MD   fenofibrate nanocrystallized (TRICOR) 145 mg tablet TAKE 1 TABLET DAILY 18  Yes Pierre Crawford MD   hydroCHLOROthiazide (HYDRODIURIL) 25 mg tablet Take 1 Tab by mouth daily. Indications: hypertension 3/28/18  Yes Pierre Crawford MD   traZODone (DESYREL) 100 mg tablet Take 1 Tab by mouth nightly.  Indications: insomnia associated with depression 3/28/18  Yes Pierre Crawford MD   sertraline (ZOLOFT) 50 mg tablet Take 1 Tab by mouth daily. Indications: major depressive disorder 3/28/18  Yes Jim Mckeon MD   Blood-Glucose Meter monitoring kit Use once daily as directed. 11/13/17  Yes Jim Mckeon MD   Lancets misc Use daily as directed 11/13/17  Yes Jim Mckeon MD   MULTIVITAMIN PO Take 1 tablet by mouth every morning. Yes Provider, Historical   glucose blood VI test strips (BLOOD GLUCOSE TEST) strip Use daily as directed - must match his glucometer. 11/13/17   Jim Mckeon MD   dapagliflozin 10 mg tab Take 1 Tab by mouth daily. Indications: type 2 diabetes mellitus 6/20/17   Jim Mckeon MD   aspirin delayed-release 81 mg tablet Take 81 mg by mouth daily. Provider, Historical      Review of Systems   Constitutional: Negative. HENT: Negative. Eyes: Positive for blurred vision. Negative for double vision, photophobia, pain, discharge and redness. Respiratory: Negative. Cardiovascular: Negative. Gastrointestinal: Positive for constipation. Negative for abdominal pain, blood in stool, diarrhea, heartburn, melena, nausea and vomiting. Genitourinary: Negative. Musculoskeletal: Positive for joint pain. Negative for back pain, falls, myalgias and neck pain. Skin: Negative. Neurological: Positive for dizziness and tingling. Negative for tremors, speech change, focal weakness, seizures, loss of consciousness and headaches. Endo/Heme/Allergies: Negative. Psychiatric/Behavioral: Positive for depression. Negative for hallucinations, memory loss, substance abuse and suicidal ideas. The patient is not nervous/anxious and does not have insomnia.         Review of Systems:        Risks colonoscopy described- colon injury, missed lesion, anesthesia problems, bleeding       Nadeem Cortes RN  January 8, 2019  3:16 PM

## 2019-01-16 DIAGNOSIS — M15.9 PRIMARY OSTEOARTHRITIS INVOLVING MULTIPLE JOINTS: ICD-10-CM

## 2019-01-16 DIAGNOSIS — Z79.891 LONG TERM (CURRENT) USE OF OPIATE ANALGESIC: Primary | ICD-10-CM

## 2019-01-16 RX ORDER — HYDROCODONE BITARTRATE AND ACETAMINOPHEN 10; 325 MG/1; MG/1
1 TABLET ORAL
Qty: 120 TAB | Refills: 0 | Status: SHIPPED | OUTPATIENT
Start: 2019-01-22 | End: 2019-02-18 | Stop reason: SDUPTHER

## 2019-01-16 NOTE — TELEPHONE ENCOUNTER
Last seen  12/21/18  Next appt   03/20/2019  Last filled   12/24/19    Requested Prescriptions     Pending Prescriptions Disp Refills    HYDROcodone-acetaminophen (NORCO)  mg tablet 120 Tab 0     Sig: Take 1 Tab by mouth every six (6) hours as needed for Pain. Max Daily Amount: 4 Tabs.

## 2019-01-17 NOTE — TELEPHONE ENCOUNTER
reviewed, no worrisome findings. Need UDS with this picked. This request is early, so this will be dated 1/22/19.

## 2019-01-21 ENCOUNTER — HOSPITAL ENCOUNTER (OUTPATIENT)
Dept: LAB | Age: 54
Discharge: HOME | End: 2019-01-21
Payer: COMMERCIAL

## 2019-01-21 DIAGNOSIS — Z79.891 LONG TERM (CURRENT) USE OF OPIATE ANALGESIC: ICD-10-CM

## 2019-01-21 PROCEDURE — 80361 OPIATES 1 OR MORE: CPT

## 2019-01-21 PROCEDURE — 80307 DRUG TEST PRSMV CHEM ANLYZR: CPT

## 2019-01-25 LAB
6MAM UR QL SCN: NEGATIVE NG/ML
AMPHETAMINE SCREEN, URINE, 734836: NEGATIVE NG/ML
BARBITURATES UR QL SCN: NEGATIVE NG/ML
BENZODIAZ UR QL: NEGATIVE NG/ML
BUPRENORPHINE, URINE: NEGATIVE NG/ML
BZE UR QL: NEGATIVE NG/ML
CANNABINOIDS UR QL SCN: NEGATIVE NG/ML
CREAT UR-MCNC: 31.7 MG/DL (ref 20–300)
EDDP UR QL: NEGATIVE NG/ML
ETHANOL UR-MCNC: NEGATIVE %
METHADONE UR QL SCN: NEGATIVE NG/ML
OPIATES UR QL SCN: NORMAL NG/ML
OPIATES UR QL: NEGATIVE NG/ML
OXYCODONE+OXYMORPHONE UR QL SCN: NEGATIVE NG/ML
PCP UR QL: NEGATIVE NG/ML
PH UR: 6.7 [PH] (ref 4.5–8.9)
PROPOXYPH UR QL: NEGATIVE NG/ML

## 2019-02-04 ENCOUNTER — TELEPHONE (OUTPATIENT)
Dept: FAMILY MEDICINE CLINIC | Facility: CLINIC | Age: 54
End: 2019-02-04

## 2019-02-04 NOTE — TELEPHONE ENCOUNTER
Marisela at Dr. Mara Best dentist office call wanting to know if Mr. Prieto Salcedo needs to stop blood thinner for dental work. Please call her [phone].

## 2019-02-05 NOTE — TELEPHONE ENCOUNTER
Phone call to Dr. Yasmin Triana dentist office in regards to patient stopping blood thinning medications prior to any dental work. Spoke with Tere Rebolledo and advised her per Dr. Arianna Reddy patient is to stop Aspirin 1 week prior and Plavix medication he is taking 3 days prior to any dental work. Per Tere Rebolledo request made to have letter in writing of recommendations per Dr. Arianna Reddy of stopping medications faxed over to their office at [phone]. Verbal agreement and understanding met.

## 2019-02-06 NOTE — TELEPHONE ENCOUNTER
Letter faxed over to Aurelio Ruiz at Dr. Cleia Pacheco dentist office at [phone] FAX# with confirmation.

## 2019-02-13 NOTE — PROGRESS NOTES
UDS negative for prescribed medication, but new prescription not written until the day after the screen. Will continue to monitor.

## 2019-02-18 DIAGNOSIS — M15.9 PRIMARY OSTEOARTHRITIS INVOLVING MULTIPLE JOINTS: ICD-10-CM

## 2019-02-18 RX ORDER — HYDROCODONE BITARTRATE AND ACETAMINOPHEN 10; 325 MG/1; MG/1
1 TABLET ORAL
Qty: 120 TAB | Refills: 0 | Status: SHIPPED | OUTPATIENT
Start: 2019-02-18 | End: 2019-03-21 | Stop reason: SDUPTHER

## 2019-02-18 NOTE — TELEPHONE ENCOUNTER
Last seen 12/21/18  Next appt  03/20/19  Last filled 01/22/19    Requested Prescriptions     Pending Prescriptions Disp Refills    HYDROcodone-acetaminophen (NORCO)  mg tablet 120 Tab 0     Sig: Take 1 Tab by mouth every six (6) hours as needed for Pain. Max Daily Amount: 4 Tabs.

## 2019-02-19 DIAGNOSIS — Z79.891 LONG TERM (CURRENT) USE OF OPIATE ANALGESIC: Primary | ICD-10-CM

## 2019-02-22 ENCOUNTER — HOSPITAL ENCOUNTER (OUTPATIENT)
Dept: LAB | Age: 54
Discharge: HOME | End: 2019-02-22
Payer: COMMERCIAL

## 2019-02-22 DIAGNOSIS — Z79.891 LONG TERM (CURRENT) USE OF OPIATE ANALGESIC: ICD-10-CM

## 2019-02-22 PROCEDURE — 80361 OPIATES 1 OR MORE: CPT

## 2019-02-22 PROCEDURE — 80307 DRUG TEST PRSMV CHEM ANLYZR: CPT

## 2019-02-27 LAB
6MAM UR QL SCN: NEGATIVE NG/ML
AMPHETAMINE SCREEN, URINE, 734836: NEGATIVE NG/ML
BARBITURATES UR QL SCN: NEGATIVE NG/ML
BENZODIAZ UR QL: NEGATIVE NG/ML
BUPRENORPHINE, URINE: NEGATIVE NG/ML
BZE UR QL: NEGATIVE NG/ML
CANNABINOIDS UR QL SCN: NEGATIVE NG/ML
CODEINE UR QL: NEGATIVE
CREAT UR-MCNC: 36.4 MG/DL (ref 20–300)
EDDP UR QL: NEGATIVE NG/ML
ETHANOL UR-MCNC: NEGATIVE %
HYDROCODONE UR CFM-MCNC: 977 NG/ML
HYDROCODONE UR QL: POSITIVE
HYDROMORPHONE UR CFM-MCNC: 447 NG/ML
HYDROMORPHONE UR QL: POSITIVE
METHADONE UR QL SCN: NEGATIVE NG/ML
MORPHINE UR QL: NEGATIVE
OPIATES UR QL SCN: NORMAL NG/ML
OPIATES UR QL: POSITIVE NG/ML
OXYCODONE+OXYMORPHONE UR QL SCN: NEGATIVE NG/ML
PCP UR QL: NEGATIVE NG/ML
PH UR: 6.3 [PH] (ref 4.5–8.9)
PROPOXYPH UR QL: NEGATIVE NG/ML

## 2019-03-12 DIAGNOSIS — M15.9 PRIMARY OSTEOARTHRITIS INVOLVING MULTIPLE JOINTS: ICD-10-CM

## 2019-03-12 NOTE — TELEPHONE ENCOUNTER
Last seen 12/21/18  Next appt   03/21/19  Last filled    02/18/19    Requested Prescriptions     Pending Prescriptions Disp Refills    HYDROcodone-acetaminophen (NORCO)  mg tablet 120 Tab 0     Sig: Take 1 Tab by mouth every six (6) hours as needed for Pain. Max Daily Amount: 4 Tabs.

## 2019-03-14 RX ORDER — HYDROCODONE BITARTRATE AND ACETAMINOPHEN 10; 325 MG/1; MG/1
1 TABLET ORAL
Qty: 120 TAB | Refills: 0 | OUTPATIENT
Start: 2019-03-14

## 2019-03-21 ENCOUNTER — OFFICE VISIT (OUTPATIENT)
Dept: FAMILY MEDICINE CLINIC | Facility: CLINIC | Age: 54
End: 2019-03-21

## 2019-03-21 ENCOUNTER — HOSPITAL ENCOUNTER (OUTPATIENT)
Dept: LAB | Age: 54
Discharge: HOME | End: 2019-03-21
Payer: COMMERCIAL

## 2019-03-21 VITALS
WEIGHT: 198 LBS | HEART RATE: 80 BPM | BODY MASS INDEX: 27.72 KG/M2 | RESPIRATION RATE: 16 BRPM | HEIGHT: 71 IN | TEMPERATURE: 97.6 F | SYSTOLIC BLOOD PRESSURE: 160 MMHG | OXYGEN SATURATION: 97 % | DIASTOLIC BLOOD PRESSURE: 110 MMHG

## 2019-03-21 DIAGNOSIS — G89.29 CHRONIC PAIN OF BOTH KNEES: Primary | ICD-10-CM

## 2019-03-21 DIAGNOSIS — M25.561 CHRONIC PAIN OF BOTH KNEES: Primary | ICD-10-CM

## 2019-03-21 DIAGNOSIS — M25.562 CHRONIC PAIN OF BOTH KNEES: Primary | ICD-10-CM

## 2019-03-21 DIAGNOSIS — M54.2 CERVICAL PAIN (NECK): ICD-10-CM

## 2019-03-21 DIAGNOSIS — M15.9 PRIMARY OSTEOARTHRITIS INVOLVING MULTIPLE JOINTS: ICD-10-CM

## 2019-03-21 DIAGNOSIS — I10 ESSENTIAL HYPERTENSION WITH GOAL BLOOD PRESSURE LESS THAN 140/90: ICD-10-CM

## 2019-03-21 DIAGNOSIS — Z79.891 ENCOUNTER FOR LONG-TERM OPIATE ANALGESIC USE: ICD-10-CM

## 2019-03-21 PROCEDURE — 80307 DRUG TEST PRSMV CHEM ANLYZR: CPT

## 2019-03-21 RX ORDER — HYDROCODONE BITARTRATE AND ACETAMINOPHEN 10; 325 MG/1; MG/1
1 TABLET ORAL
Qty: 28 TAB | Refills: 0 | Status: SHIPPED | OUTPATIENT
Start: 2019-03-21 | End: 2019-03-28

## 2019-03-21 NOTE — PROGRESS NOTES
Mauro Burden is a 52 y.o. male presenting today for Neck Pain; Back Pain; and Neuropathy  . HPI:  Mauro Burden presents to the office today for medication refill. Patient notes he has a history of cervical neck pain, lumbar pain and bilateral knee pain as well as neuropathy. Patient has a history of chronic opioid use. Patient notes that he has bilateral knee pain but has never had x-rays of his joints. He also is complaining of cervical neck pain but has not had any recent x-rays of his cervical spine. He is negative for chest pain, palpitation or lower extremity edema. Patient has a history of hypertension his blood pressure is elevated today. He also did not take his medication this morning. Review of Systems   Respiratory: Negative for cough. Cardiovascular: Negative for chest pain and palpitations. Gastrointestinal: Negative for nausea and vomiting. Musculoskeletal: Positive for back pain, joint pain and neck pain. No Known Allergies    Current Outpatient Medications   Medication Sig Dispense Refill    HYDROcodone-acetaminophen (NORCO)  mg tablet Take 1 Tab by mouth every six (6) hours as needed for Pain for up to 7 days. Max Daily Amount: 4 Tabs. Indications: Pain 28 Tab 0    gabapentin (NEURONTIN) 800 mg tablet Take 1 Tab by mouth three (3) times daily. 270 Tab 3    glipiZIDE (GLUCOTROL) 10 mg tablet Take 1 Tab by mouth two (2) times a day. 180 Tab 3    clopidogrel (PLAVIX) 75 mg tab Take 1 Tab by mouth daily. 90 Tab 3    metFORMIN (GLUCOPHAGE) 1,000 mg tablet Take 1 Tab by mouth two (2) times daily (with meals). Indications: type 2 diabetes mellitus 180 Tab 3    atorvastatin (LIPITOR) 40 mg tablet Take 1 Tab by mouth daily. 90 Tab 3    ramipril (ALTACE) 10 mg capsule Take 1 Cap by mouth daily.  Indications: hypertension 90 Cap 3    amLODIPine (NORVASC) 10 mg tablet TAKE 1 TABLET DAILY 90 Tab 3    fenofibrate nanocrystallized (TRICOR) 145 mg tablet TAKE 1 TABLET DAILY 90 Tab 3    hydroCHLOROthiazide (HYDRODIURIL) 25 mg tablet Take 1 Tab by mouth daily. Indications: hypertension 90 Tab 3    traZODone (DESYREL) 100 mg tablet Take 1 Tab by mouth nightly. Indications: insomnia associated with depression 90 Tab 3    sertraline (ZOLOFT) 50 mg tablet Take 1 Tab by mouth daily. Indications: major depressive disorder 90 Tab 3    Blood-Glucose Meter monitoring kit Use once daily as directed. 1 Kit 0    glucose blood VI test strips (BLOOD GLUCOSE TEST) strip Use daily as directed - must match his glucometer. 100 Strip 3    Lancets misc Use daily as directed 100 Each 3    aspirin delayed-release 81 mg tablet Take 81 mg by mouth daily.  MULTIVITAMIN PO Take 1 tablet by mouth every morning. Past Medical History:   Diagnosis Date    Abnormal nuclear cardiac imaging test 01/31/2012    Mod inferior infarction w/mild-mod simona-infarct ischemia. LVE. EF 43%. Marked basal inferior hypk; otherwise mild-mod inferior, inferolateral, distal anteroapical hypk. TID index 1.07. Pos max EST suggests subtotal obstruction of RCA.  Arthritis     Calculus of kidney     Chronic pain     Coronary artery disease     Inferior MI in 1998 status post RCA stent; ISR in 1999 status post rotational atherectomy. Posterior wall MI 2002 with circumflex stent.  Depression     Dyslipidemia     History of echocardiogram 10/09/2012    EF 50-55%. No WMA. Gr 1 DDfx. RVSP normal.  LAE.  IVCE.  Hyperlipidemia     Hypertension     mild    Long term current use of anticoagulant therapy     Neuropathy     Non-insulin dependent diabetes mellitus     Renal duplex 12/02/2013    No significant RA stenosis. Patent bilateral renal veins w/o thrombosis.  S/P attempted coronary angioplasty 10/08/2012    Unsuccessful angioplasty of chronic RCA occlusion w/collaterals.  S/P cardiac cath 02/17/2012    %. ISR. LM patent. LAD patent. pD1 55%. mCX patent.   OM1 80% (3 x 30 [place] stent o/lapped w/3 x 12 [place] stent; residual 0%). LVEDP 13. EF 50-55%.          Past Surgical History:   Procedure Laterality Date    CARDIAC SURG PROCEDURE UNLIST      stents x3    HX APPENDECTOMY      HX UROLOGICAL Right     kidney stone    HX UROLOGICAL Bilateral     orchiopexy for torsion       Social History     Socioeconomic History    Marital status: SINGLE     Spouse name: Not on file    Number of children: Not on file    Years of education: Not on file    Highest education level: Not on file   Occupational History    Not on file   Social Needs    Financial resource strain: Not on file    Food insecurity:     Worry: Not on file     Inability: Not on file    Transportation needs:     Medical: Not on file     Non-medical: Not on file   Tobacco Use    Smoking status: Former Smoker     Years: 20.00     Last attempt to quit: 2007     Years since quittin.1    Smokeless tobacco: Never Used   Substance and Sexual Activity    Alcohol use: No    Drug use: No    Sexual activity: Not Currently   Lifestyle    Physical activity:     Days per week: Not on file     Minutes per session: Not on file    Stress: Not on file   Relationships    Social connections:     Talks on phone: Not on file     Gets together: Not on file     Attends Hinduism service: Not on file     Active member of club or organization: Not on file     Attends meetings of clubs or organizations: Not on file     Relationship status: Not on file    Intimate partner violence:     Fear of current or ex partner: Not on file     Emotionally abused: Not on file     Physically abused: Not on file     Forced sexual activity: Not on file   Other Topics Concern    Not on file   Social History Narrative    Not on file       Patient does not have an advanced directive on file    Vitals:    19 1601   BP: (!) 160/110   Pulse: 80   Resp: 16   Temp: 97.6 °F (36.4 °C)   TempSrc: Tympanic   SpO2: 97%   Weight: 198 lb (89.8 kg)   Height: 5' 11\" (1.803 m)   PainSc:   6   PainLoc: Neck       Physical Exam   Cardiovascular: Normal rate, regular rhythm and normal heart sounds. Pulmonary/Chest: Effort normal and breath sounds normal.   Musculoskeletal:        Left knee: He exhibits normal range of motion, no swelling and no deformity. Cervical back: He exhibits pain. Neurological: He is alert. Nursing note and vitals reviewed. Hospital Outpatient Visit on 02/22/2019   Component Date Value Ref Range Status    Amphetamine screen, urine 02/22/2019 NEGATIVE   Eibvye=104 ng/mL Final    Comment: (NOTE)  Amphetamine test includes Amphetamine and Methamphetamine.  Barbiturates 02/22/2019 NEGATIVE   Mpimtp=473 ng/mL Final    Benzodiazepines 02/22/2019 NEGATIVE   Nqtxkg=565 ng/mL Final    Cannabinoids 02/22/2019 NEGATIVE   Cutoff=20 ng/mL Final    Cocaine metabolite, urine 02/22/2019 NEGATIVE   Tsoyep=631 ng/mL Final    Opiates 02/22/2019 See Final Results  Gvrmpk=083 ng/mL Final    Comment: (NOTE)  Opiate test includes Codeine, Morphine, Hydromorphone, Hydrocodone.   Performed At:  [facility]  [facility] [address] [id number]  Yossi Angel PhD VX:[id number]      6-Acetylmorphone, urine 02/22/2019 NEGATIVE   Cutoff=10 ng/mL Final    Oxycodone/Oxymorphone, urine 02/22/2019 NEGATIVE   Piyzoq=651 ng/mL Final    Comment: (NOTE)  Test includes Oxycodone and Oxymorphone      Phencyclidine 02/22/2019 NEGATIVE   Cutoff=25 ng/mL Final    Methadone Screen, Urine 02/22/2019 NEGATIVE   Qfgbyk=868 ng/mL Final    EDDP, urine 02/22/2019 NEGATIVE   Nhzaet=649 ng/mL Final    PROPOXYPHENE 02/22/2019 NEGATIVE   Qvljlu=544 ng/mL Final    Buprenorphine, urine 02/22/2019 NEGATIVE   Cutoff=10 ng/mL Final    Ethanol, urine 02/22/2019 NEGATIVE   Cutoff=0.020 % Final    Creatinine, urine 02/22/2019 36.4  20.0 - 300.0 mg/dL Final    pH, urine 02/22/2019 6.3  4.5 - 8.9   Final    Comment: (NOTE)  Performed At:  [facility]  [facility] [address] [place] [id number]  Rafa Barajas PhD [id number]      Opiates 02/22/2019 Positive* Pdskhm=162 ng/mL Final    Comment: (NOTE)  Opiate test includes Codeine, Morphine, Hydromorphone, Hydrocodone. Confirmation performed by Mass Spectrometry      Codeine 02/22/2019 NEGATIVE   Yubppy=147   Final    Morphine 02/22/2019 NEGATIVE   Gbkksi=030   Final    Hydromorphone 02/22/2019 Positive*   Final    Hydromorphone confirm 02/22/2019 447  Ajwrxp=684 ng/mL Final    Hydrocodone, urine 02/22/2019 Positive*   Final    Hydrocodone confirm 02/22/2019 977  Cemlzm=981 ng/mL Final    Comment: (NOTE)  Performed At:  [facility]  [address] [id number]  Rafa Barajas PhD [id number]     Hospital Outpatient Visit on 01/21/2019   Component Date Value Ref Range Status    Amphetamine screen, urine 01/21/2019 NEGATIVE   Vvutir=755 ng/mL Final    Comment: (NOTE)  Amphetamine test includes Amphetamine and Methamphetamine.  Barbiturates 01/21/2019 NEGATIVE   Sfthmx=759 ng/mL Final    Benzodiazepines 01/21/2019 NEGATIVE   Rduynp=030 ng/mL Final    Cannabinoids 01/21/2019 NEGATIVE   Cutoff=20 ng/mL Final    Cocaine metabolite, urine 01/21/2019 NEGATIVE   Iafnlq=256 ng/mL Final    Opiates 01/21/2019 See Final Results  Svcpwp=950 ng/mL Final    Comment: (NOTE)  Opiate test includes Codeine, Morphine, Hydromorphone, Hydrocodone.   Performed At: [facility]  [address] [id number]  Rafa Barajas PhD TX:[id number]      6-Acetylmorphone, urine 01/21/2019 NEGATIVE   Cutoff=10 ng/mL Final    Oxycodone/Oxymorphone, urine 01/21/2019 NEGATIVE   Gewqxb=715 ng/mL Final    Comment: (NOTE)  Test includes Oxycodone and Oxymorphone      Phencyclidine 01/21/2019 NEGATIVE   Cutoff=25 ng/mL Final    Methadone Screen, Urine 01/21/2019 NEGATIVE   Zpoaiy=453 ng/mL Final    EDDP, urine 01/21/2019 NEGATIVE   Tmrsqb=552 ng/mL Final    PROPOXYPHENE 01/21/2019 NEGATIVE   Yajjph=518 ng/mL Final    Buprenorphine, urine 01/21/2019 NEGATIVE   Cutoff=10 ng/mL Final    Ethanol, urine 01/21/2019 NEGATIVE   Cutoff=0.020 % Final    Creatinine, urine 01/21/2019 31.7  20.0 - 300.0 mg/dL Final    pH, urine 01/21/2019 6.7  4.5 - 8.9   Final    Comment: (NOTE)  Performed At:  [facility] [address] [id number]  Lissette Penny PhD PJ:[id number]      Opiates 01/21/2019 NEGATIVE   Qsyywy=086 ng/mL Final    Comment: (NOTE)  Opiate test includes Codeine, Morphine, Hydromorphone, Hydrocodone. Confirmation performed by Mass Spectrometry  Performed At: [facility] [address] [id number]  Lissette Penny PhD MU:[id number]         . No results found for any visits on 03/21/19. Assessment / Plan:      ICD-10-CM ICD-9-CM    1. Chronic pain of both knees M25.561 719.46 REFERRAL TO ORTHOPEDICS    M25.562 338.29 XR KNEE RT MIN 4 V    G89.29  XR KNEE LT MIN 4 V      REFERRAL TO PAIN MANAGEMENT   2. Cervical pain (neck) M54.2 723.1 XR SPINE CERV 4 OR 5 V   3. Encounter for long-term opiate analgesic use Z79.891 V58.69 COMPLIANCE DRUG SCREEN/PRESCRIPTION MONITORING      COMPLIANCE DRUG SCREEN/PRESCRIPTION MONITORING   4. Primary osteoarthritis involving multiple joints M15.0 715.09 HYDROcodone-acetaminophen (NORCO)  mg tablet   5. Essential hypertension with goal blood pressure less than 140/90 I10 401.9      Patient given referral for pain management  X-rays of the right and left knee. Cervical spine x-ray  Referral to pain management  Patient given hydrocodone  x 7 days. No further refills. Follow-up with orthopedic        I asked the patient if he  had any questions and answered his  questions.   The patient stated that he understands the treatment plan and agrees with the treatment plan

## 2019-03-28 LAB — DRUGS UR: NORMAL

## 2019-04-03 ENCOUNTER — TELEPHONE (OUTPATIENT)
Dept: FAMILY MEDICINE CLINIC | Facility: CLINIC | Age: 54
End: 2019-04-03

## 2019-04-03 NOTE — TELEPHONE ENCOUNTER
Patient called stating he can not afford to do all the medical test that was ordered. He also want to get a referral to a suboxoen clinic.   Please call him back at [phone]

## 2019-04-16 ENCOUNTER — TELEPHONE (OUTPATIENT)
Dept: FAMILY MEDICINE CLINIC | Facility: CLINIC | Age: 54
End: 2019-04-16

## 2019-04-18 ENCOUNTER — TELEPHONE (OUTPATIENT)
Dept: CARDIOLOGY CLINIC | Age: 54
End: 2019-04-18

## 2019-04-18 ENCOUNTER — DOCUMENTATION ONLY (OUTPATIENT)
Dept: SURGERY | Age: 54
End: 2019-04-18

## 2019-04-18 NOTE — TELEPHONE ENCOUNTER
Dr. Etienne Daily office called patient having colonoscopy on 4-24-19 and they want to know if they can hold asa and plavix 5-7 days prior.

## 2019-04-18 NOTE — PROGRESS NOTES
Called Dr. Enriquez Blind office to f/u on clearance to hold his Plavix and Aspirin 7 days prior to his colonoscopy on 4/24/19. Message was sent to Dr. Ama Salas by nurse, awaiting approval. I called patient to see if patient has stopped these meds yet or has gotten clearance from Dr. Ama Salas. Left message on both phone numbers. Awaiting return call.

## 2019-04-19 ENCOUNTER — TELEPHONE (OUTPATIENT)
Dept: SURGERY | Age: 54
End: 2019-04-19

## 2019-04-19 ENCOUNTER — DOCUMENTATION ONLY (OUTPATIENT)
Dept: SURGERY | Age: 54
End: 2019-04-19

## 2019-04-19 NOTE — TELEPHONE ENCOUNTER
Spoke with Jerry Ahmadi at dr Nabeel Brown office and approval received for stopping plavix and aspirin see [facility] also called patient and had to leave message making sure he had stopped both medications

## 2019-04-19 NOTE — TELEPHONE ENCOUNTER
Dr Fannie Kumar aware in absence of Dr Joselo Valdez.    Ok to hold 5-7 days prior to colonoscopy     Clerance [place] at Dr. Abrahan Elizabeth office aware

## 2019-04-23 ENCOUNTER — ANESTHESIA EVENT (OUTPATIENT)
Dept: ENDOSCOPY | Age: 54
End: 2019-04-23
Payer: COMMERCIAL

## 2019-04-24 ENCOUNTER — ANESTHESIA (OUTPATIENT)
Dept: ENDOSCOPY | Age: 54
End: 2019-04-24
Payer: COMMERCIAL

## 2019-04-24 ENCOUNTER — HOSPITAL ENCOUNTER (OUTPATIENT)
Age: 54
Setting detail: OUTPATIENT SURGERY
Discharge: HOME | End: 2019-04-24
Attending: COLON & RECTAL SURGERY | Admitting: COLON & RECTAL SURGERY
Payer: COMMERCIAL

## 2019-04-24 VITALS
SYSTOLIC BLOOD PRESSURE: 126 MMHG | BODY MASS INDEX: 27.3 KG/M2 | HEIGHT: 71 IN | DIASTOLIC BLOOD PRESSURE: 76 MMHG | WEIGHT: 195 LBS | OXYGEN SATURATION: 97 % | RESPIRATION RATE: 21 BRPM | HEART RATE: 77 BPM | TEMPERATURE: 98.6 F

## 2019-04-24 DIAGNOSIS — F32.2 SEVERE SINGLE CURRENT EPISODE OF MAJOR DEPRESSIVE DISORDER, WITHOUT PSYCHOTIC FEATURES (HCC): ICD-10-CM

## 2019-04-24 DIAGNOSIS — I10 ESSENTIAL HYPERTENSION WITH GOAL BLOOD PRESSURE LESS THAN 140/90: ICD-10-CM

## 2019-04-24 DIAGNOSIS — F51.05 INSOMNIA DUE TO OTHER MENTAL DISORDER: ICD-10-CM

## 2019-04-24 DIAGNOSIS — F99 INSOMNIA DUE TO OTHER MENTAL DISORDER: ICD-10-CM

## 2019-04-24 LAB
GLUCOSE BLD STRIP.AUTO-MCNC: 256 MG/DL (ref 70–110)
GLUCOSE BLD STRIP.AUTO-MCNC: 276 MG/DL (ref 70–110)

## 2019-04-24 PROCEDURE — 82962 GLUCOSE BLOOD TEST: CPT

## 2019-04-24 PROCEDURE — 74011250636 HC RX REV CODE- 250/636: Performed by: NURSE ANESTHETIST, CERTIFIED REGISTERED

## 2019-04-24 PROCEDURE — 76060000031 HC ANESTHESIA FIRST 0.5 HR: Performed by: COLON & RECTAL SURGERY

## 2019-04-24 PROCEDURE — 74011250636 HC RX REV CODE- 250/636

## 2019-04-24 PROCEDURE — 74011636637 HC RX REV CODE- 636/637: Performed by: NURSE ANESTHETIST, CERTIFIED REGISTERED

## 2019-04-24 PROCEDURE — 76040000019: Performed by: COLON & RECTAL SURGERY

## 2019-04-24 RX ORDER — SODIUM CHLORIDE 0.9 % (FLUSH) 0.9 %
5-40 SYRINGE (ML) INJECTION EVERY 8 HOURS
Status: DISCONTINUED | OUTPATIENT
Start: 2019-04-24 | End: 2019-04-24 | Stop reason: HOSPADM

## 2019-04-24 RX ORDER — LIDOCAINE HYDROCHLORIDE 10 MG/ML
0.1 INJECTION, SOLUTION EPIDURAL; INFILTRATION; INTRACAUDAL; PERINEURAL AS NEEDED
Status: DISCONTINUED | OUTPATIENT
Start: 2019-04-24 | End: 2019-04-24 | Stop reason: HOSPADM

## 2019-04-24 RX ORDER — TRAZODONE HYDROCHLORIDE 100 MG/1
100 TABLET ORAL
Qty: 30 TAB | Refills: 0 | Status: SHIPPED | OUTPATIENT
Start: 2019-04-24 | End: 2019-05-21 | Stop reason: SDUPTHER

## 2019-04-24 RX ORDER — INSULIN LISPRO 100 [IU]/ML
INJECTION, SOLUTION INTRAVENOUS; SUBCUTANEOUS ONCE
Status: COMPLETED | OUTPATIENT
Start: 2019-04-24 | End: 2019-04-24

## 2019-04-24 RX ORDER — SODIUM CHLORIDE, SODIUM LACTATE, POTASSIUM CHLORIDE, CALCIUM CHLORIDE 600; 310; 30; 20 MG/100ML; MG/100ML; MG/100ML; MG/100ML
75 INJECTION, SOLUTION INTRAVENOUS CONTINUOUS
Status: DISCONTINUED | OUTPATIENT
Start: 2019-04-24 | End: 2019-04-24 | Stop reason: HOSPADM

## 2019-04-24 RX ORDER — PROPOFOL 10 MG/ML
INJECTION, EMULSION INTRAVENOUS AS NEEDED
Status: DISCONTINUED | OUTPATIENT
Start: 2019-04-24 | End: 2019-04-24 | Stop reason: HOSPADM

## 2019-04-24 RX ORDER — SERTRALINE HYDROCHLORIDE 50 MG/1
50 TABLET, FILM COATED ORAL DAILY
Qty: 30 TAB | Refills: 0 | Status: SHIPPED | OUTPATIENT
Start: 2019-04-24 | End: 2019-05-21 | Stop reason: SDUPTHER

## 2019-04-24 RX ORDER — HYDROCHLOROTHIAZIDE 25 MG/1
25 TABLET ORAL DAILY
Qty: 30 TAB | Refills: 0 | Status: SHIPPED | OUTPATIENT
Start: 2019-04-24 | End: 2019-05-21 | Stop reason: SDUPTHER

## 2019-04-24 RX ORDER — SODIUM CHLORIDE 0.9 % (FLUSH) 0.9 %
5-40 SYRINGE (ML) INJECTION AS NEEDED
Status: DISCONTINUED | OUTPATIENT
Start: 2019-04-24 | End: 2019-04-24 | Stop reason: HOSPADM

## 2019-04-24 RX ADMIN — PROPOFOL 20 MG: 10 INJECTION, EMULSION INTRAVENOUS at 07:48

## 2019-04-24 RX ADMIN — PROPOFOL 30 MG: 10 INJECTION, EMULSION INTRAVENOUS at 07:53

## 2019-04-24 RX ADMIN — FAMOTIDINE 20 MG: 10 INJECTION INTRAVENOUS at 07:32

## 2019-04-24 RX ADMIN — PROPOFOL 20 MG: 10 INJECTION, EMULSION INTRAVENOUS at 07:50

## 2019-04-24 RX ADMIN — PROPOFOL 20 MG: 10 INJECTION, EMULSION INTRAVENOUS at 07:45

## 2019-04-24 RX ADMIN — PROPOFOL 10 MG: 10 INJECTION, EMULSION INTRAVENOUS at 07:44

## 2019-04-24 RX ADMIN — INSULIN LISPRO 9 UNITS: 100 INJECTION, SOLUTION INTRAVENOUS; SUBCUTANEOUS at 07:25

## 2019-04-24 RX ADMIN — PROPOFOL 80 MG: 10 INJECTION, EMULSION INTRAVENOUS at 07:39

## 2019-04-24 RX ADMIN — SODIUM CHLORIDE, SODIUM LACTATE, POTASSIUM CHLORIDE, AND CALCIUM CHLORIDE 75 ML/HR: 600; 310; 30; 20 INJECTION, SOLUTION INTRAVENOUS at 07:28

## 2019-04-24 RX ADMIN — PROPOFOL 20 MG: 10 INJECTION, EMULSION INTRAVENOUS at 07:41

## 2019-04-24 NOTE — TELEPHONE ENCOUNTER
Requested Prescriptions     Pending Prescriptions Disp Refills    hydroCHLOROthiazide (HYDRODIURIL) 25 mg tablet 90 Tab 3     Sig: Take 1 Tab by mouth daily. Indications: high blood pressure    sertraline (ZOLOFT) 50 mg tablet 90 Tab 3     Sig: Take 1 Tab by mouth daily. Indications: major depressive disorder    traZODone (DESYREL) 100 mg tablet 90 Tab 3     Sig: Take 1 Tab by mouth nightly.  Indications: insomnia associated with depression     Patient is out thanks pharmacy had him to call us he had no refills

## 2019-04-24 NOTE — H&P
HPI: Elieser Ghosh is a 52 y.o. male presenting with chief complain of need for crc screening. Past Medical History:  
Diagnosis Date  Abnormal nuclear cardiac imaging test 01/31/2012 Mod inferior infarction w/mild-mod simona-infarct ischemia. LVE. EF 43%. Marked basal inferior hypk; otherwise mild-mod inferior, inferolateral, distal anteroapical hypk. TID index 1.07. Pos max EST suggests subtotal obstruction of RCA.  Arthritis  Calculus of kidney  Chronic pain  Coronary artery disease Inferior MI in 1998 status post RCA stent; ISR in 1999 status post rotational atherectomy. Posterior wall MI 2002 with circumflex stent.  Depression  Dyslipidemia  History of echocardiogram 10/09/2012 EF 50-55%. No WMA. Gr 1 DDfx. RVSP normal.  LAE.  IVCE.  Hyperlipidemia  Hypertension   
 mild  Long term current use of anticoagulant therapy  Neuropathy  Non-insulin dependent diabetes mellitus  Renal duplex 12/02/2013 No significant RA stenosis. Patent bilateral renal veins w/o thrombosis.  S/P attempted coronary angioplasty 10/08/2012 Unsuccessful angioplasty of chronic RCA occlusion w/collaterals.  S/P cardiac cath 02/17/2012 %. ISR. LM patent. LAD patent. pD1 55%. mCX patent. OM1 80% (3 x 30 [place] stent o/lapped w/3 x 12 [place] stent; residual 0%). LVEDP 13. EF 50-55%. Past Surgical History:  
Procedure Laterality Date  CARDIAC SURG PROCEDURE UNLIST    
 stents x3  
 HX APPENDECTOMY  HX UROLOGICAL Right   
 kidney stone  HX UROLOGICAL Bilateral   
 orchiopexy for torsion Family History Problem Relation Age of Onset  Heart Disease Mother  Diabetes Father  Heart Disease Maternal Grandfather Social History Socioeconomic History  Marital status:  Spouse name: Not on file  Number of children: Not on file  Years of education: Not on file  Highest education level: Not on file Tobacco Use  Smoking status: Former Smoker Years: 20.00 Last attempt to quit: 2007 Years since quittin.2  Smokeless tobacco: Never Used Substance and Sexual Activity  Alcohol use: No  
 Drug use: No  
 Sexual activity: Not Currently Review of Systems - neg Outpatient Medications Marked as Taking for the 19 encounter [facility] Encounter) Medication Sig Dispense Refill  gabapentin (NEURONTIN) 800 mg tablet Take 1 Tab by mouth three (3) times daily. 270 Tab 3  
 glipiZIDE (GLUCOTROL) 10 mg tablet Take 1 Tab by mouth two (2) times a day. 180 Tab 3  clopidogrel (PLAVIX) 75 mg tab Take 1 Tab by mouth daily. 90 Tab 3  
 metFORMIN (GLUCOPHAGE) 1,000 mg tablet Take 1 Tab by mouth two (2) times daily (with meals). Indications: type 2 diabetes mellitus 180 Tab 3  
 atorvastatin (LIPITOR) 40 mg tablet Take 1 Tab by mouth daily. 90 Tab 3  
 ramipril (ALTACE) 10 mg capsule Take 1 Cap by mouth daily. Indications: hypertension 90 Cap 3  
 amLODIPine (NORVASC) 10 mg tablet TAKE 1 TABLET DAILY 90 Tab 3  
 fenofibrate nanocrystallized (TRICOR) 145 mg tablet TAKE 1 TABLET DAILY 90 Tab 3  
 hydroCHLOROthiazide (HYDRODIURIL) 25 mg tablet Take 1 Tab by mouth daily. Indications: hypertension 90 Tab 3  
 traZODone (DESYREL) 100 mg tablet Take 1 Tab by mouth nightly. Indications: insomnia associated with depression 90 Tab 3  
 sertraline (ZOLOFT) 50 mg tablet Take 1 Tab by mouth daily. Indications: major depressive disorder 90 Tab 3  
 aspirin delayed-release 81 mg tablet Take 81 mg by mouth daily. No Known Allergies Vitals:  
 04/15/19 1008 19 6682 BP:  (!) 156/94 Pulse:  82 Resp:  19 Temp:  98.6 °F (37 °C) SpO2:  97% Weight: 90.7 kg (200 lb) 88.5 kg (195 lb) Height: 5' 11\" (1.803 m) 5' 11\" (1.803 m) Physical Exam  
Constitutional: He appears well-developed and well-nourished.   
HENT:  
 Head: Normocephalic and atraumatic. Eyes: Conjunctivae and EOM are normal.  
Abdominal: Soft. He exhibits no distension and no mass. There is no tenderness. Musculoskeletal: Normal range of motion. Lymphadenopathy:  
  He has no cervical adenopathy. Right: No inguinal adenopathy present. Left: No inguinal adenopathy present. Neurological: He exhibits normal muscle tone. Skin: Skin is warm and dry. Psychiatric: He has a normal mood and affect. His speech is normal.  
 
 
Assessment / Plan 
 
colonoscopy The diagnoses and plan were discussed with the patient. All questions answered. Plan of care agreed to by all concerned.

## 2019-04-24 NOTE — PROCEDURES
[facility]  [facility] [address], [address]   [place], [address]  [phone]                    Colonoscopy Procedure Note      Tana Thomas  1965  [id number]                Date of Procedure: 4/24/2019    Preoperative diagnosis: Z12.11,  Colon cancer Screening, History of polyps    Postoperative diagnosis: Normal    :  Parrish Hurtado MD    Assistant(s): Endoscopy RN-1: Pancho Randhawa RN; Christine Jensen RN    Sedation: MAC    Complications: None    Implants: None    Procedure Details:  Prior to the procedure, a history and physical were performed. The patients medications, allergies and sensitivities were reviewed and all questions were answered. After informed consent was obtained for the procedure, with all risks and benefits of procedure explained. The patient was taken to the endoscopy suite and placed in the left lateral decubitus position. Patient identification and proposed procedure were verified prior to the procedure by the nurse and I. After sequential anesthesia administered by anesthesiologist, a digital rectal exam was performed and was normal.  The Olympus video colonoscope was introduced through the anus and advanced to terminal ileum. The quality of preparation was good. The colonoscope was slowly withdrawn and the mucosa examined for any abnormalities. Cecal withdrawal time was greater than 6 minutes. The patient tolerated the procedure well. There were no complications. Findings/Interventions:   Polyps - none    EBL: none    Recommendations: -Repeat colonoscopy in 5 years. Resume normal medication(s).      Discharge Disposition:  Magno Varela MD  4/24/2019  8:00 AM

## 2019-04-24 NOTE — DISCHARGE INSTRUCTIONS
Colonoscopy: What to Expect at [address] [place] [address]  After you have a colonoscopy, you will stay at the clinic for 1 to 2 hours until the medicines wear off. Then you can go home. But you will need to arrange for a ride. Your doctor will tell you when you can eat and do your other usual activities. Your doctor will talk to you about when you will need your next colonoscopy. Your doctor can help you decide how often you need to be checked. This will depend on the results of your test and your risk for colorectal cancer. After the test, you may be bloated or have gas pains. You may need to pass gas. If a biopsy was done or a polyp was removed, you may have streaks of blood in your stool (feces) for a few days. This care sheet gives you a general idea about how long it will take for you to recover. But each person recovers at a different pace. Follow the steps below to get better as quickly as possible. How can you care for yourself at home? Activity  · Rest when you feel tired. · You can do your normal activities when it feels okay to do so. Diet  · Follow your doctor's directions for eating. · Unless your doctor has told you not to, drink plenty of fluids. This helps to replace the fluids that were lost during the colon prep. · Do not drink alcohol. Medicines  · If polyps were removed or a biopsy was done during the test, your doctor may tell you not to take aspirin or other anti-inflammatory medicines for a few days. These include ibuprofen (Advil, Motrin) and naproxen (Aleve). Other instructions  · For your safety, do not drive or operate machinery until the medicine wears off and you can think clearly. Your doctor may tell you not to drive or operate machinery until the day after your test.  · Do not sign legal documents or make major decisions until the medicine wears off and you can think clearly. The anesthesia can make it hard for you to fully understand what you are agreeing to.   Follow-up care is a key part of your treatment and safety. Be sure to make and go to all appointments, and call your doctor if you are having problems. It's also a good idea to know your test results and keep a list of the medicines you take. When should you call for help? Call 911 anytime you think you may need emergency care. For example, call if:  · You passed out (lost consciousness). · You pass maroon or bloody stools. · You have severe belly pain. Call your doctor now or seek immediate medical care if:  · Your stools are black and tarlike. · Your stools have streaks of blood, but you did not have a biopsy or any polyps removed. · You have belly pain, or your belly is swollen and firm. · You vomit. · You have a fever. · You are very dizzy. Watch closely for changes in your health, and be sure to contact your doctor if you have any problems. Where can you learn more? Go to [employer]ER[employer]  Enter E264 in the search box to learn more about \"Colonoscopy: What to Expect at Home. \"   © 2240-0714 [employer]. Care instructions adapted under license by [facility] (which disclaims liability or warranty for this information). This care instruction is for use with your licensed healthcare professional. If you have questions about a medical condition or this instruction, always ask your healthcare professional. [place] [address] any warranty or liability for your use of this information. Content Version: 55.6.161417; Current as of: November 14, 2014      DISCHARGE SUMMARY from Nurse     POST-PROCEDURE INSTRUCTIONS:    Call your Physician if you:  ? Observe any excess bleeding. ? Develop a temperature over 100.5o F.  ? Experience abdominal, shoulder or chest pain. ? Notice any signs of decreased circulation or nerve impairment to an extremity such as a change in color, persistent numbness, tingling, coldness or increase in pain. ?  Vomit blood or you have nausea and vomiting lasting longer than 4 hours. ? Are unable to take medications. ? Are unable to urinate within 8 hours after discharge following general anesthesia or intravenous sedation. For the next 24 hours after receiving general anesthesia or intravenous sedation, or while taking prescription Narcotics, limit your activities:  ? Do NOT drive a motor vehicle, operate hazard machinery or power tools, or perform tasks that require coordination. The medication you received during your procedure may have some effect on your mental awareness. ? Do NOT make important personal or business decisions. The medication you received during your procedure may have some effect on your mental awareness. ? Do NOT drink alcoholic beverages. These drinks do not mix well with the medications that have been given to you. ? Upon discharge from the hospital, you must be accompanied by a responsible adult. ? Resume your diet as directed by your physician. ? Resume medications as your physician has prescribed. ? Please give a list of your current medications to your Primary Care Provider. ? Please update this list whenever your medications are discontinued, doses are changed, or new medications (including over-the-counter products) are added. ? Please carry medication information at all times in case of emergency situations. These are general instructions for a healthy lifestyle:    No smoking/ No tobacco products/ Avoid exposure to second hand smoke.  Surgeon General's Warning:  Quitting smoking now greatly reduces serious risk to your health. Obesity, smoking, and a sedentary lifestyle greatly increase your risk for illness.    A healthy diet, regular physical exercise & weight monitoring are important for maintaining a healthy lifestyle   You may be retaining fluid if you have a history of heart failure or if you experience any of the following symptoms:  Weight gain of 3 pounds or more overnight or 5 pounds in a week, increased swelling in our hands or feet or shortness of breath while lying flat in bed. Please call your doctor as soon as you notice any of these symptoms; do not wait until your next office visit. Recognize signs and symptoms of STROKE:  F  -  Face looks uneven  A  -  Arms unable to move or move unevenly  S  -  Speech slurred or non-existent  T  -  Time to call 911 - as soon as signs and symptoms begin - DO NOT go back to bed or wait to see If you get better - TIME IS BRAIN. Colorectal Screening   Colorectal cancer almost always develops from precancerous polyps (abnormal growths) in the colon or rectum. Screening tests can find precancerous polyps, so that they can be removed before they turn into cancer. Screening tests can also find colorectal cancer early, when treatment works best.  [facility] Speak with your physician about when you should begin screening and how often you should be tested. Additional Information    If you have questions, please call [phone]. Remember, [employer] is NOT to be used for urgent needs. For medical emergencies, dial 911. Educational references and/or instructions provided during this visit included:      Appointment in: Other (Specify) Repeat colonoscopy in 5 years        Discharge information has been reviewed with the patient. The patient verbalized understanding.

## 2019-04-24 NOTE — ANESTHESIA PREPROCEDURE EVALUATION
Relevant Problems No relevant active problems Anesthetic History No history of anesthetic complications Review of Systems / Medical History Patient summary reviewed and pertinent labs reviewed Pulmonary Within defined limits Neuro/Psych Psychiatric history Cardiovascular Exercise tolerance: >4 METS Comments: Stents x3 in 1999 GI/Hepatic/Renal 
Within defined limits Endo/Other Other Findings Physical Exam 
 
Airway Mallampati: I 
TM Distance: 4 - 6 cm Neck ROM: normal range of motion Mouth opening: Normal 
 
 Cardiovascular Rhythm: regular Rate: normal 
 
 
 
 Dental 
No notable dental hx Pulmonary Breath sounds clear to auscultation Abdominal 
GI exam deferred Other Findings Anesthetic Plan ASA: 3 Anesthesia type: MAC Induction: Intravenous Anesthetic plan and risks discussed with: Patient

## 2019-04-24 NOTE — ANESTHESIA POSTPROCEDURE EVALUATION
Procedure(s): 
COLONOSCOPY. MAC Anesthesia Post Evaluation Multimodal analgesia: multimodal analgesia not used between 6 hours prior to anesthesia start to PACU discharge Patient location during evaluation: bedside Patient participation: complete - patient participated Level of consciousness: awake Pain score: 0 Pain management: adequate Airway patency: patent Anesthetic complications: no 
Cardiovascular status: acceptable Respiratory status: acceptable Hydration status: acceptable Post anesthesia nausea and vomiting:  none Vitals Value Taken Time /74 4/24/2019  8:03 AM  
Temp Pulse 84 4/24/2019  8:03 AM  
Resp 16 4/24/2019  8:03 AM  
SpO2 96 % 4/24/2019  8:03 AM

## 2019-05-21 DIAGNOSIS — I10 ESSENTIAL HYPERTENSION WITH GOAL BLOOD PRESSURE LESS THAN 140/90: ICD-10-CM

## 2019-05-21 DIAGNOSIS — F32.2 SEVERE SINGLE CURRENT EPISODE OF MAJOR DEPRESSIVE DISORDER, WITHOUT PSYCHOTIC FEATURES (HCC): ICD-10-CM

## 2019-05-21 DIAGNOSIS — F99 INSOMNIA DUE TO OTHER MENTAL DISORDER: ICD-10-CM

## 2019-05-21 DIAGNOSIS — F51.05 INSOMNIA DUE TO OTHER MENTAL DISORDER: ICD-10-CM

## 2019-05-23 RX ORDER — TRAZODONE HYDROCHLORIDE 100 MG/1
TABLET ORAL
Qty: 30 TAB | Refills: 0 | Status: SHIPPED | OUTPATIENT
Start: 2019-05-23 | End: 2019-06-24 | Stop reason: SDUPTHER

## 2019-05-23 RX ORDER — HYDROCHLOROTHIAZIDE 25 MG/1
25 TABLET ORAL DAILY
Qty: 30 TAB | Refills: 0 | Status: SHIPPED | OUTPATIENT
Start: 2019-05-23 | End: 2019-06-24 | Stop reason: SDUPTHER

## 2019-05-23 RX ORDER — SERTRALINE HYDROCHLORIDE 50 MG/1
TABLET, FILM COATED ORAL
Qty: 30 TAB | Refills: 0 | Status: SHIPPED | OUTPATIENT
Start: 2019-05-23 | End: 2019-06-24 | Stop reason: SDUPTHER

## 2019-06-24 DIAGNOSIS — I10 ESSENTIAL HYPERTENSION WITH GOAL BLOOD PRESSURE LESS THAN 140/90: ICD-10-CM

## 2019-06-24 DIAGNOSIS — F32.2 SEVERE SINGLE CURRENT EPISODE OF MAJOR DEPRESSIVE DISORDER, WITHOUT PSYCHOTIC FEATURES (HCC): ICD-10-CM

## 2019-06-24 DIAGNOSIS — F51.05 INSOMNIA DUE TO OTHER MENTAL DISORDER: ICD-10-CM

## 2019-06-24 DIAGNOSIS — F99 INSOMNIA DUE TO OTHER MENTAL DISORDER: ICD-10-CM

## 2019-06-24 RX ORDER — TRAZODONE HYDROCHLORIDE 100 MG/1
TABLET ORAL
Qty: 30 TAB | Refills: 0 | Status: SHIPPED | OUTPATIENT
Start: 2019-06-24 | End: 2019-07-21 | Stop reason: SDUPTHER

## 2019-06-24 RX ORDER — SERTRALINE HYDROCHLORIDE 50 MG/1
TABLET, FILM COATED ORAL
Qty: 30 TAB | Refills: 0 | Status: SHIPPED | OUTPATIENT
Start: 2019-06-24 | End: 2019-07-21 | Stop reason: SDUPTHER

## 2019-06-24 RX ORDER — HYDROCHLOROTHIAZIDE 25 MG/1
25 TABLET ORAL DAILY
Qty: 30 TAB | Refills: 0 | Status: SHIPPED | OUTPATIENT
Start: 2019-06-24 | End: 2019-07-19 | Stop reason: SDUPTHER

## 2019-07-19 DIAGNOSIS — I10 ESSENTIAL HYPERTENSION WITH GOAL BLOOD PRESSURE LESS THAN 140/90: ICD-10-CM

## 2019-07-21 DIAGNOSIS — F51.05 INSOMNIA DUE TO OTHER MENTAL DISORDER: ICD-10-CM

## 2019-07-21 DIAGNOSIS — F99 INSOMNIA DUE TO OTHER MENTAL DISORDER: ICD-10-CM

## 2019-07-21 DIAGNOSIS — F32.2 SEVERE SINGLE CURRENT EPISODE OF MAJOR DEPRESSIVE DISORDER, WITHOUT PSYCHOTIC FEATURES (HCC): ICD-10-CM

## 2019-07-22 RX ORDER — TRAZODONE HYDROCHLORIDE 100 MG/1
TABLET ORAL
Qty: 30 TAB | Refills: 0 | Status: SHIPPED | OUTPATIENT
Start: 2019-07-22 | End: 2019-08-20 | Stop reason: SDUPTHER

## 2019-07-22 RX ORDER — HYDROCHLOROTHIAZIDE 25 MG/1
25 TABLET ORAL DAILY
Qty: 30 TAB | Refills: 0 | Status: SHIPPED | OUTPATIENT
Start: 2019-07-22 | End: 2019-08-17 | Stop reason: SDUPTHER

## 2019-07-22 RX ORDER — SERTRALINE HYDROCHLORIDE 50 MG/1
TABLET, FILM COATED ORAL
Qty: 30 TAB | Refills: 0 | Status: SHIPPED | OUTPATIENT
Start: 2019-07-22 | End: 2019-08-17 | Stop reason: SDUPTHER

## 2019-07-23 DIAGNOSIS — E11.42 TYPE 2 DIABETES MELLITUS WITH PERIPHERAL NEUROPATHY (HCC): ICD-10-CM

## 2019-07-23 NOTE — TELEPHONE ENCOUNTER
Requested Prescriptions     Pending Prescriptions Disp Refills    gabapentin (NEURONTIN) 800 mg tablet 270 Tab 3     Sig: Take 1 Tab by mouth three (3) times daily.  Indications: Neuropathic Pain

## 2019-07-26 RX ORDER — GABAPENTIN 800 MG/1
800 TABLET ORAL 3 TIMES DAILY
Qty: 90 TAB | Refills: 0 | Status: SHIPPED | OUTPATIENT
Start: 2019-07-26 | End: 2019-09-08 | Stop reason: SDUPTHER

## 2019-08-01 ENCOUNTER — TELEPHONE (OUTPATIENT)
Dept: FAMILY MEDICINE CLINIC | Facility: CLINIC | Age: 54
End: 2019-08-01

## 2019-08-01 NOTE — TELEPHONE ENCOUNTER
Called patient left  for patient to call the office about his request for Gabapentin refill. Medication will not be refilled patient needs appointment.

## 2019-08-17 DIAGNOSIS — I10 ESSENTIAL HYPERTENSION WITH GOAL BLOOD PRESSURE LESS THAN 140/90: ICD-10-CM

## 2019-08-17 DIAGNOSIS — F32.2 SEVERE SINGLE CURRENT EPISODE OF MAJOR DEPRESSIVE DISORDER, WITHOUT PSYCHOTIC FEATURES (HCC): ICD-10-CM

## 2019-08-17 RX ORDER — SERTRALINE HYDROCHLORIDE 50 MG/1
TABLET, FILM COATED ORAL
Qty: 30 TAB | Refills: 0 | Status: SHIPPED | OUTPATIENT
Start: 2019-08-17 | End: 2019-09-16 | Stop reason: SDUPTHER

## 2019-08-17 RX ORDER — HYDROCHLOROTHIAZIDE 25 MG/1
25 TABLET ORAL DAILY
Qty: 30 TAB | Refills: 0 | Status: SHIPPED | OUTPATIENT
Start: 2019-08-17 | End: 2019-09-16 | Stop reason: SDUPTHER

## 2019-08-20 ENCOUNTER — OFFICE VISIT (OUTPATIENT)
Dept: FAMILY MEDICINE CLINIC | Facility: CLINIC | Age: 54
End: 2019-08-20

## 2019-08-20 VITALS
SYSTOLIC BLOOD PRESSURE: 139 MMHG | TEMPERATURE: 97.2 F | HEIGHT: 71 IN | WEIGHT: 193.6 LBS | BODY MASS INDEX: 27.1 KG/M2 | DIASTOLIC BLOOD PRESSURE: 82 MMHG | RESPIRATION RATE: 12 BRPM | HEART RATE: 78 BPM | OXYGEN SATURATION: 95 %

## 2019-08-20 DIAGNOSIS — R80.9 MICROALBUMINURIA DUE TO TYPE 2 DIABETES MELLITUS (HCC): ICD-10-CM

## 2019-08-20 DIAGNOSIS — I10 ESSENTIAL HYPERTENSION WITH GOAL BLOOD PRESSURE LESS THAN 140/90: Primary | ICD-10-CM

## 2019-08-20 DIAGNOSIS — E11.42 TYPE 2 DIABETES MELLITUS WITH PERIPHERAL NEUROPATHY (HCC): ICD-10-CM

## 2019-08-20 DIAGNOSIS — E11.29 MICROALBUMINURIA DUE TO TYPE 2 DIABETES MELLITUS (HCC): ICD-10-CM

## 2019-08-20 DIAGNOSIS — E11.40 TYPE 2 DIABETES MELLITUS WITH DIABETIC NEUROPATHY, WITHOUT LONG-TERM CURRENT USE OF INSULIN (HCC): ICD-10-CM

## 2019-08-20 DIAGNOSIS — M79.671 PAIN IN BOTH FEET: ICD-10-CM

## 2019-08-20 DIAGNOSIS — E78.5 HYPERLIPIDEMIA, UNSPECIFIED HYPERLIPIDEMIA TYPE: ICD-10-CM

## 2019-08-20 DIAGNOSIS — M79.672 PAIN IN BOTH FEET: ICD-10-CM

## 2019-08-20 DIAGNOSIS — F51.05 INSOMNIA DUE TO OTHER MENTAL DISORDER: ICD-10-CM

## 2019-08-20 DIAGNOSIS — F99 INSOMNIA DUE TO OTHER MENTAL DISORDER: ICD-10-CM

## 2019-08-20 LAB — HBA1C MFR BLD HPLC: 12.2 %

## 2019-08-20 RX ORDER — TRAZODONE HYDROCHLORIDE 100 MG/1
TABLET ORAL
Qty: 30 TAB | Refills: 0 | Status: SHIPPED | OUTPATIENT
Start: 2019-08-20 | End: 2019-09-19 | Stop reason: SDUPTHER

## 2019-08-20 NOTE — PROGRESS NOTES
ROOM # 3    Sloane Colon presents today for   Chief Complaint   Patient presents with    Foot Pain     Pt complains of bilateral L>R foot pain that is worse at night       Sloane Cooln preferred language for health care discussion is english/other.     Is someone accompanying this pt? no    Is the patient using any DME equipment during [address]? no    Depression Screening:  3 most recent PHQ Screens 12/29/2017 6/20/2017 3/24/2017 8/3/2015   Little interest or pleasure in doing things Nearly every day Not at all Not at all Not at all   Feeling down, depressed, irritable, or hopeless Nearly every day Not at all Not at all Not at all   Total Score PHQ 2 6 0 0 0   Trouble falling or staying asleep, or sleeping too much Nearly every day - - -   Feeling tired or having little energy Nearly every day - - -   Poor appetite, weight loss, or overeating Not at all - - -   Feeling bad about yourself - or that you are a failure or have let yourself or your family down Nearly every day - - -   Trouble concentrating on things such as school, work, reading, or watching TV Nearly every day - - -   Moving or speaking so slowly that other people could have noticed; or the opposite being so fidgety that others notice Not at all - - -   Thoughts of being better off dead, or hurting yourself in some way Nearly every day - - -   PHQ 9 Score 21 - - -   How difficult have these problems made it for you to do your work, take care of your home and get along with others Somewhat difficult - - -       Learning Assessment:  Learning Assessment 8/1/2017 8/9/2016 4/7/2014   PRIMARY LEARNER Patient Patient Patient   HIGHEST LEVEL OF EDUCATION - PRIMARY LEARNER  - - [phone] [employer] CAREGIVER - - No   PRIMARY LANGUAGE ENGLISH ENGLISH ENGLISH   LEARNER PREFERENCE PRIMARY DEMONSTRATION DEMONSTRATION READING   ANSWERED BY Patient Patient patient   RELATIONSHIP SELF SELF SELF       Abuse Screening:  Abuse Screening Questionnaire 11/26/2018 3/28/2018   Do you ever feel afraid of your partner? N N   Are you in a relationship with someone who physically or mentally threatens you? N N   Is it safe for you to go home? Y Y       Fall Risk  No flowsheet data found. Health Maintenance reviewed and discussed per provider. Yes    Addie Brown is due for   Health Maintenance Due   Topic Date Due    Shingrix Vaccine Age 50> (1 of 2) 11/24/2013    LIPID PANEL Q1  02/05/2017    HEMOGLOBIN A1C Q6M  06/21/2019    Influenza Age 9 to Adult  08/01/2019         Please order/place referral if appropriate. Advance Directive:  1. Do you have an advance directive in place? Patient Reply: no    2. If not, would you like material regarding how to put one in place? Patient Reply: no    Coordination of Care:  1. Have you been to the ER, urgent care clinic since your last visit? Hospitalized since your last visit? no    2. Have you seen or consulted any other health care providers outside of the [address] [place] [address] since your last visit? Include any pap smears or colon screening.  no

## 2019-08-23 DIAGNOSIS — E78.5 HYPERLIPIDEMIA, UNSPECIFIED HYPERLIPIDEMIA TYPE: ICD-10-CM

## 2019-08-23 RX ORDER — FENOFIBRATE 145 MG/1
TABLET, COATED ORAL
Qty: 90 TAB | Refills: 1 | Status: SHIPPED | OUTPATIENT
Start: 2019-08-23

## 2019-08-23 NOTE — TELEPHONE ENCOUNTER
Requested Prescriptions     Pending Prescriptions Disp Refills    fenofibrate nanocrystallized (TRICOR) 145 mg tablet 90 Tab 1     Sig: TAKE 1 TABLET DAILY     Last seen 08/20/19  Next appt: 11/20/19  Last filled 05/24/19

## 2019-08-30 NOTE — PROGRESS NOTES
Karen Stiles is a 52 y.o. male presenting today for Foot Pain (Pt complains of bilateral L>R foot pain that is worse at night)    HPI:  Karen Stiles presents to the office today for bilateral feet pain and burning sensation that is worse at night. He notes the left foot pain is worse than the right. He currently takes Gabapentin rx for neuropathy. He has a history of diabetes, hypertension and hyperlipdidemia. He is negative for chest pain, palpitations or dyspnea. He is negative for polyuria, polydipsia or polyphagia. His blood pressure is 139/82    Review of Systems   Respiratory: Negative for cough. Cardiovascular: Negative for chest pain and palpitations. Gastrointestinal: Negative for nausea and vomiting. Musculoskeletal: Positive for myalgias (bilateral feet). Endo/Heme/Allergies: Negative for polydipsia. No Known Allergies    Current Outpatient Medications   Medication Sig Dispense Refill    traZODone (DESYREL) 100 mg tablet TAKE 1 TAB BY MOUTH NIGHTLY. APPOINTMENT NEEDED TO ESTABLISH CARE 30 Tab 0    dulaglutide (TRULICITY) 1.68 DJ/7.0 mL sub-q pen 0.5 mL by SubCUTAneous route every seven (7) days. 10 Pen 1    sertraline (ZOLOFT) 50 mg tablet TAKE 1 TAB BY MOUTH DAILY. APPOINTMENT NEEDED TO ESTABLISH CARE 30 Tab 0    hydroCHLOROthiazide (HYDRODIURIL) 25 mg tablet TAKE 1 TAB BY MOUTH DAILY. APPOINTMENT NEEDED TO ESTABLISH CARE INDICATIONS: HIGH BLOOD PRESSURE 30 Tab 0    gabapentin (NEURONTIN) 800 mg tablet Take 1 Tab by mouth three (3) times daily. Indications: Neuropathic Pain 90 Tab 0    clopidogrel (PLAVIX) 75 mg tab Take 1 Tab by mouth daily. 90 Tab 3    metFORMIN (GLUCOPHAGE) 1,000 mg tablet Take 1 Tab by mouth two (2) times daily (with meals). Indications: type 2 diabetes mellitus 180 Tab 3    atorvastatin (LIPITOR) 40 mg tablet Take 1 Tab by mouth daily. 90 Tab 3    ramipril (ALTACE) 10 mg capsule Take 1 Cap by mouth daily.  Indications: hypertension 90 Cap 3    amLODIPine (NORVASC) 10 mg tablet TAKE 1 TABLET DAILY 90 Tab 3    glucose blood VI test strips (BLOOD GLUCOSE TEST) strip Use daily as directed - must match his glucometer. 100 Strip 3    Lancets misc Use daily as directed 100 Each 3    aspirin delayed-release 81 mg tablet Take 81 mg by mouth daily.  fenofibrate nanocrystallized (TRICOR) 145 mg tablet TAKE 1 TABLET DAILY 90 Tab 1    Blood-Glucose Meter monitoring kit Use once daily as directed. 1 Kit 0    MULTIVITAMIN PO Take 1 tablet by mouth every morning. Past Medical History:   Diagnosis Date    Abnormal nuclear cardiac imaging test 01/31/2012    Mod inferior infarction w/mild-mod simona-infarct ischemia. LVE. EF 43%. Marked basal inferior hypk; otherwise mild-mod inferior, inferolateral, distal anteroapical hypk. TID index 1.07. Pos max EST suggests subtotal obstruction of RCA.  Arthritis     Calculus of kidney     Chronic pain     Coronary artery disease     Inferior MI in 1998 status post RCA stent; ISR in 1999 status post rotational atherectomy. Posterior wall MI 2002 with circumflex stent.  Depression     Dyslipidemia     History of echocardiogram 10/09/2012    EF 50-55%. No WMA. Gr 1 DDfx. RVSP normal.  LAE.  IVCE.  Hyperlipidemia     Hypertension     mild    Long term current use of anticoagulant therapy     Neuropathy     Non-insulin dependent diabetes mellitus     Renal duplex 12/02/2013    No significant RA stenosis. Patent bilateral renal veins w/o thrombosis.  S/P attempted coronary angioplasty 10/08/2012    Unsuccessful angioplasty of chronic RCA occlusion w/collaterals.  S/P cardiac cath 02/17/2012    %. ISR. LM patent. LAD patent. pD1 55%. mCX patent. OM1 80% (3 x 30 McGrath stent o/lapped w/3 x 12 McGrath stent; residual 0%). LVEDP 13. EF 50-55%.          Past Surgical History:   Procedure Laterality Date    CARDIAC SURG PROCEDURE UNLIST      stents x3    COLONOSCOPY N/A 2019    COLONOSCOPY performed [facility] Cat[facility]ritchett [facility] at [facility] ENDOSCOPY    HX APPENDECTOMY      HX UROLOGICAL Right     kidney stone    HX UROLOGICAL Bilateral     orchiopexy for torsion       Social History     Socioeconomic History    Marital status: SINGLE     Spouse name: Not on file    Number of children: Not on file    Years of education: Not on file    Highest education level: Not on file   Occupational History    Not on file   Social Needs    Financial resource strain: Not on file    Food insecurity:     Worry: Not on file     Inability: Not on file    Transportation needs:     Medical: Not on file     Non-medical: Not on file   Tobacco Use    Smoking status: Former Smoker     Years: 20.00     Last attempt to quit: 2007     Years since quittin.6    Smokeless tobacco: Never Used   Substance and Sexual Activity    Alcohol use: No    Drug use: No    Sexual activity: Not Currently   Lifestyle    Physical activity:     Days per week: Not on file     Minutes per session: Not on file    Stress: Not on file   Relationships    Social connections:     Talks on phone: Not on file     Gets together: Not on file     Attends Voodoo service: Not on file     Active member of club or organization: Not on file     Attends meetings of clubs or organizations: Not on file     Relationship status: Not on file    Intimate partner violence:     Fear of current or ex partner: Not on file     Emotionally abused: Not on file     Physically abused: Not on file     Forced sexual activity: Not on file   Other Topics Concern    Not on file   Social History Narrative    Not on file       Patient does not have an advanced directive on file    Vitals:    19 1436   BP: 139/82   Pulse: 78   Resp: 12   Temp: 97.2 °F (36.2 °C)   TempSrc: Oral   SpO2: 95%   Weight: 193 lb 9.6 oz (87.8 kg)   Height: 5' 11\" (1.803 m)   PainSc:   6   PainLoc: Foot       Physical Exam   Constitutional: No distress. Cardiovascular: Normal rate, regular rhythm and normal heart sounds. Pulmonary/Chest: Effort normal and breath sounds normal.   Musculoskeletal: Normal range of motion. He exhibits tenderness (mild). He exhibits no edema. Neurological: He is alert. Nursing note and vitals reviewed. Office Visit on 08/20/2019   Component Date Value Ref Range Status    Hemoglobin A1c (POC) 08/20/2019 12.2  % Final       .  Results for orders placed or performed in visit on 08/20/19   AMB POC HEMOGLOBIN A1C   Result Value Ref Range    Hemoglobin A1c (POC) 12.2 %       Assessment / Plan:      ICD-10-CM ICD-9-CM    1. Essential hypertension with goal blood pressure less than 140/90 I10 401.9    2. Type 2 diabetes mellitus with peripheral neuropathy (HCC) E11.42 250.60 AMB POC HEMOGLOBIN A1C     357.2 dulaglutide (TRULICITY) 8.26 JQ/9.4 mL sub-q pen   3. Hyperlipidemia, unspecified hyperlipidemia type E78.5 272.4    4. Microalbuminuria due to type 2 diabetes mellitus ([facility]) E11.29 250.40     R80.9 791.0    5. Type 2 diabetes mellitus with diabetic neuropathy, without long-term current use of insulin ([facility]) E11.40 250.60      357.2    6. Pain in both feet M79.671 729.5     M79.672       Fasting labs next visit  Hgb V0Y 19.8.- Trulicity rx given  HTN- controlled  Feet pain secondary to neuropathy    I asked the patient if he  had any questions and answered his  questions. The patient stated that he understands the treatment plan and agrees with the treatment plan    This document was created with a voice activated dictation system and may contain transcription errors.

## 2019-09-08 DIAGNOSIS — E11.42 TYPE 2 DIABETES MELLITUS WITH PERIPHERAL NEUROPATHY (HCC): ICD-10-CM

## 2019-09-09 RX ORDER — GABAPENTIN 800 MG/1
TABLET ORAL
Qty: 90 TAB | Refills: 0 | Status: SHIPPED | OUTPATIENT
Start: 2019-09-09 | End: 2019-11-20 | Stop reason: SDUPTHER

## 2019-09-19 DIAGNOSIS — F99 INSOMNIA DUE TO OTHER MENTAL DISORDER: ICD-10-CM

## 2019-09-19 DIAGNOSIS — F51.05 INSOMNIA DUE TO OTHER MENTAL DISORDER: ICD-10-CM

## 2019-09-19 RX ORDER — TRAZODONE HYDROCHLORIDE 100 MG/1
TABLET ORAL
Qty: 30 TAB | Refills: 0 | Status: SHIPPED | OUTPATIENT
Start: 2019-09-19 | End: 2019-10-22 | Stop reason: SDUPTHER

## 2019-10-15 ENCOUNTER — OFFICE VISIT (OUTPATIENT)
Dept: CARDIOLOGY CLINIC | Age: 54
End: 2019-10-15

## 2019-10-15 VITALS
SYSTOLIC BLOOD PRESSURE: 120 MMHG | WEIGHT: 185 LBS | BODY MASS INDEX: 25.9 KG/M2 | OXYGEN SATURATION: 98 % | DIASTOLIC BLOOD PRESSURE: 70 MMHG | HEART RATE: 55 BPM | HEIGHT: 71 IN

## 2019-10-15 DIAGNOSIS — I25.10 CORONARY ARTERY DISEASE INVOLVING NATIVE CORONARY ARTERY OF NATIVE HEART WITHOUT ANGINA PECTORIS: Primary | ICD-10-CM

## 2019-10-15 DIAGNOSIS — E78.5 HYPERLIPIDEMIA, UNSPECIFIED HYPERLIPIDEMIA TYPE: ICD-10-CM

## 2019-10-15 DIAGNOSIS — I10 ESSENTIAL HYPERTENSION WITH GOAL BLOOD PRESSURE LESS THAN 140/90: ICD-10-CM

## 2019-10-15 NOTE — PROGRESS NOTES
HISTORY OF PRESENT ILLNESS  Rosa Quintanilla is a 52 y.o. male. ASSESSMENT and PLAN    Mr. Rosa Quintanilla has history of CAD. He has known occluded RCA noted back in 1998 with in-stent restenosis. He did Undergo rotational atherectomy in 1999 but subsequently had restenosis again. Back in October of 2012,  intervention was attempted without success. Patient was continued on medical therapy. He has significant first-degree AV block with MD interval of 300 ms, which has now progressed to second-degree type I heart block, asymptomatic. · CAD:    Clinically stable. · Heart block: He has second-degree type I heart block without symptoms. Would continue observation. · BP:   Well controlled. · HR:    Stable. · CHF:    Currently, there is no evidence of decompensated CHF noted. · Weight:    His weight today is 185 pounds. This is excellent weight. His baseline weight is 200 pounds. I recommended maintaining his current weight. · Cholesterol:   Target LDL <70. Lipitor 40, TriCor 145. · Anti-platelet:   Remains on ASA. I will see him back annually. Thank you. Encounter Diagnoses   Name Primary?  Coronary artery disease involving native coronary artery of native heart without angina pectoris Yes    Essential hypertension with goal blood pressure less than 140/90     Hyperlipidemia, unspecified hyperlipidemia type      current treatment plan is effective, no change in therapy  lab results and schedule of future lab studies reviewed with patient  reviewed diet, exercise and weight control  cardiovascular risk and specific lipid/LDL goals reviewed  use of aspirin to prevent MI and TIA's discussed        HPI   Today, Mr. Pamela Patricia has no complaints of chest pains, increased shortness of breath or decreased exercise capacity. He denies any orthopnea or PND. He denies any palpitations or dizziness. He does have bradycardia but has not had any exercise-induced dizziness or fatigue.     Review of Systems   Respiratory: Negative for shortness of breath. Cardiovascular: Negative for chest pain, palpitations, orthopnea, claudication, leg swelling and PND. All other systems reviewed and are negative. Physical Exam   Constitutional: He is oriented to person, place, and time. He appears well-developed and well-nourished. HENT:   Head: Normocephalic. Eyes: Conjunctivae are normal.   Neck: Neck supple. No JVD present. Carotid bruit is not present. No thyromegaly present. Cardiovascular: Normal rate and regular rhythm. Pulmonary/Chest: Breath sounds normal.   Abdominal: Bowel sounds are normal.   Musculoskeletal: He exhibits no edema. Neurological: He is alert and oriented to person, place, and time. Skin: Skin is warm and dry. Nursing note and vitals reviewed. PCP: Joni Tai NP    Past Medical History:   Diagnosis Date    Abnormal nuclear cardiac imaging test 01/31/2012    Mod inferior infarction w/mild-mod simona-infarct ischemia. LVE. EF 43%. Marked basal inferior hypk; otherwise mild-mod inferior, inferolateral, distal anteroapical hypk. TID index 1.07. Pos max EST suggests subtotal obstruction of RCA.  Arthritis     Calculus of kidney     Chronic pain     Coronary artery disease     Inferior MI in 1998 status post RCA stent; ISR in 1999 status post rotational atherectomy. Posterior wall MI 2002 with circumflex stent.  Depression     Dyslipidemia     History of echocardiogram 10/09/2012    EF 50-55%. No WMA. Gr 1 DDfx. RVSP normal.  LAE.  IVCE.  Hyperlipidemia     Hypertension     mild    Long term current use of anticoagulant therapy     Neuropathy     Non-insulin dependent diabetes mellitus     Renal duplex 12/02/2013    No significant RA stenosis. Patent bilateral renal veins w/o thrombosis.  S/P attempted coronary angioplasty 10/08/2012    Unsuccessful angioplasty of chronic RCA occlusion w/collaterals.     S/P cardiac cath 02/17/2012    %. ISR. LM patent. LAD patent. pD1 55%. mCX patent. OM1 80% (3 x 30 [place] stent o/lapped w/3 x 12 [place] stent; residual 0%). LVEDP 13. EF 50-55%. Past Surgical History:   Procedure Laterality Date    CARDIAC SURG PROCEDURE UNLIST      stents x3    COLONOSCOPY N/A 4/24/2019    COLONOS[facility] performed by Mauricio [facility] ENDOSCOPY    HX APPENDECTOMY      HX UROLOGICAL Right     kidney stone    HX UROLOGICAL Bilateral     orchiopexy for torsion       Current Outpatient Medications   Medication Sig Dispense Refill    traZODone (DESYREL) 100 mg tablet TAKE 1 TAB BY MOUTH NIGHTLY. APPOINTMENT NEEDED TO ESTABLISH CARE 30 Tab 0    hydroCHLOROthiazide (HYDRODIURIL) 25 mg tablet TAKE 1 TAB BY MOUTH DAILY. APPOINTMENT NEEDED TO ESTABLISH CARE INDICATIONS: HIGH BLOOD PRESSURE 30 Tab 2    sertraline (ZOLOFT) 50 mg tablet TAKE 1 TAB BY MOUTH DAILY. APPOINTMENT NEEDED TO ESTABLISH CARE 30 Tab 2    gabapentin (NEURONTIN) 800 mg tablet TAKE 1 TAB BY MOUTH 3 TIMES A DAY 90 Tab 0    fenofibrate nanocrystallized (TRICOR) 145 mg tablet TAKE 1 TABLET DAILY 90 Tab 1    dulaglutide (TRULICITY) 4.25 OA/9.6 mL sub-q pen 0.5 mL by SubCUTAneous route every seven (7) days. 10 Pen 1    clopidogrel (PLAVIX) 75 mg tab Take 1 Tab by mouth daily. 90 Tab 3    metFORMIN (GLUCOPHAGE) 1,000 mg tablet Take 1 Tab by mouth two (2) times daily (with meals). Indications: type 2 diabetes mellitus 180 Tab 3    atorvastatin (LIPITOR) 40 mg tablet Take 1 Tab by mouth daily. 90 Tab 3    ramipril (ALTACE) 10 mg capsule Take 1 Cap by mouth daily. Indications: hypertension 90 Cap 3    amLODIPine (NORVASC) 10 mg tablet TAKE 1 TABLET DAILY 90 Tab 3    Blood-Glucose Meter monitoring kit Use once daily as directed. 1 Kit 0    glucose blood VI test strips (BLOOD GLUCOSE TEST) strip Use daily as directed - must match his glucometer.  100 Strip 3    Lancets misc Use daily as directed 100 Each 3    aspirin delayed-release 81 mg tablet Take 81 mg by mouth daily.  MULTIVITAMIN PO Take 1 tablet by mouth every morning. The patient has a family history of    Social History     Tobacco Use    Smoking status: Former Smoker     Years: 20.00     Last attempt to quit: 2007     Years since quittin.7    Smokeless tobacco: Never Used   Substance Use Topics    Alcohol use: No    Drug use: No       Lab Results   Component Value Date/Time    Cholesterol, total 192 2016 09:10 AM    HDL Cholesterol 28 (L) 2016 09:10 AM    LDL,Direct 61 2010 10:34 AM    LDL, calculated 102.8 (H) 2016 09:10 AM    Triglyceride 306 (H) 2016 09:10 AM    CHOL/HDL Ratio 6.9 (H) 2016 09:10 AM        BP Readings from Last 3 Encounters:   10/15/19 120/70   19 139/82   19 126/76        Pulse Readings from Last 3 Encounters:   10/15/19 (!) 55   19 78   19 77       Wt Readings from Last 3 Encounters:   10/15/19 83.9 kg (185 lb)   19 87.8 kg (193 lb 9.6 oz)   19 88.5 kg (195 lb)         EKG: normal sinus rhythm, nonspecific ST and T waves changes, second-degree AV block type I, Wenckebach pattern.

## 2019-10-15 NOTE — PROGRESS NOTES
Rajesh Logan presents today for   Chief Complaint   Patient presents with    Coronary Artery Disease     9-12 month follow up - no cardiac complaints        Rajesh Logan preferred language for health care discussion is english/other. Is someone accompanying this pt? no    Is the patient using any DME equipment during [address]? no    Depression Screening:  3 most recent PHQ Screens 12/29/2017   Little interest or pleasure in doing things Nearly every day   Feeling down, depressed, irritable, or hopeless Nearly every day   Total Score PHQ 2 6   Trouble falling or staying asleep, or sleeping too much Nearly every day   Feeling tired or having little energy Nearly every day   Poor appetite, weight loss, or overeating Not at all   Feeling bad about yourself - or that you are a failure or have let yourself or your family down Nearly every day   Trouble concentrating on things such as school, work, reading, or watching TV Nearly every day   Moving or speaking so slowly that other people could have noticed; or the opposite being so fidgety that others notice Not at all   Thoughts of being better off dead, or hurting yourself in some way Nearly every day   PHQ 9 Score 21   How difficult have these problems made it for you to do your work, take care of your home and get along with others Somewhat difficult       Learning Assessment:  Learning Assessment 8/1/2017   PRIMARY LEARNER Patient   HIGHEST LEVEL OF EDUCATION - PRIMARY LEARNER  -   CO-LEARNER CAREGIVER -   PRIMARY LANGUAGE ENGLISH   LEARNER PREFERENCE PRIMARY DEMONSTRATION   ANSWERED BY Patient   RELATIONSHIP SELF       Abuse Screening:  Abuse Screening Questionnaire 11/26/2018   Do you ever feel afraid of your partner? N   Are you in a relationship with someone who physically or mentally threatens you? N   Is it safe for you to go home? Y       Fall Risk  No flowsheet data found. Pt currently taking Anticoagulant therapy?  ASA 81mg every day     Coordination of Care:  1. Have you been to the ER, urgent care clinic since your last visit? Hospitalized since your last visit? no    2. Have you seen or consulted any other health care providers outside of the [address] [place] [address] since your last visit? Include any pap smears or colon screening.  no

## 2019-10-18 DIAGNOSIS — I10 ESSENTIAL HYPERTENSION WITH GOAL BLOOD PRESSURE LESS THAN 140/90: ICD-10-CM

## 2019-10-18 DIAGNOSIS — E11.42 TYPE 2 DIABETES MELLITUS WITH PERIPHERAL NEUROPATHY (HCC): ICD-10-CM

## 2019-10-18 NOTE — TELEPHONE ENCOUNTER
Requested Prescriptions     Pending Prescriptions Disp Refills    ramipril (ALTACE) 10 mg capsule 90 Cap 3     Sig: Take 1 Cap by mouth daily. Indications: high blood pressure    metFORMIN (GLUCOPHAGE) 1,000 mg tablet 180 Tab 3     Sig: Take 1 Tab by mouth two (2) times daily (with meals). Indications: type 2 diabetes mellitus    glipiZIDE (GLUCOTROL) 10 mg tablet 180 Tab 3     Sig: Take 1 Tab by mouth two (2) times a day.

## 2019-10-22 RX ORDER — RAMIPRIL 10 MG/1
10 CAPSULE ORAL DAILY
Qty: 90 CAP | Refills: 1 | Status: SHIPPED | OUTPATIENT
Start: 2019-10-22

## 2019-10-22 RX ORDER — GLIPIZIDE 10 MG/1
10 TABLET ORAL 2 TIMES DAILY
Qty: 180 TAB | Refills: 1 | Status: SHIPPED | OUTPATIENT
Start: 2019-10-22 | End: 2019-11-20 | Stop reason: ALTCHOICE

## 2019-10-22 RX ORDER — METFORMIN HYDROCHLORIDE 1000 MG/1
1000 TABLET ORAL 2 TIMES DAILY WITH MEALS
Qty: 180 TAB | Refills: 1 | Status: SHIPPED | OUTPATIENT
Start: 2019-10-22 | End: 2019-11-20

## 2019-11-05 DIAGNOSIS — I10 ESSENTIAL HYPERTENSION WITH GOAL BLOOD PRESSURE LESS THAN 140/90: ICD-10-CM

## 2019-11-05 NOTE — TELEPHONE ENCOUNTER
Requested Prescriptions     Pending Prescriptions Disp Refills    amLODIPine (NORVASC) 10 mg tablet 90 Tab 3

## 2019-11-06 RX ORDER — AMLODIPINE BESYLATE 10 MG/1
TABLET ORAL
Qty: 90 TAB | Refills: 1 | Status: SHIPPED | OUTPATIENT
Start: 2019-11-06 | End: 2019-11-07 | Stop reason: SDUPTHER

## 2019-11-07 DIAGNOSIS — I10 ESSENTIAL HYPERTENSION WITH GOAL BLOOD PRESSURE LESS THAN 140/90: ICD-10-CM

## 2019-11-07 RX ORDER — AMLODIPINE BESYLATE 10 MG/1
TABLET ORAL
Qty: 90 TAB | Refills: 1 | Status: SHIPPED | OUTPATIENT
Start: 2019-11-07

## 2019-11-07 NOTE — TELEPHONE ENCOUNTER
Requested Prescriptions     Pending Prescriptions Disp Refills    amLODIPine (NORVASC) 10 mg tablet 90 Tab 1     Sig: TAKE 1 TABLET DAILY

## 2019-11-11 DIAGNOSIS — E11.42 TYPE 2 DIABETES MELLITUS WITH PERIPHERAL NEUROPATHY (HCC): ICD-10-CM

## 2019-11-19 DIAGNOSIS — I25.10 CORONARY ARTERY DISEASE INVOLVING NATIVE CORONARY ARTERY OF NATIVE HEART WITHOUT ANGINA PECTORIS: ICD-10-CM

## 2019-11-19 DIAGNOSIS — E78.5 HYPERLIPIDEMIA, UNSPECIFIED HYPERLIPIDEMIA TYPE: ICD-10-CM

## 2019-11-19 RX ORDER — ATORVASTATIN CALCIUM 40 MG/1
40 TABLET, FILM COATED ORAL DAILY
Qty: 90 TAB | Refills: 3 | Status: SHIPPED | OUTPATIENT
Start: 2019-11-19

## 2019-11-19 RX ORDER — CLOPIDOGREL BISULFATE 75 MG/1
75 TABLET ORAL DAILY
Qty: 90 TAB | Refills: 3 | Status: SHIPPED | OUTPATIENT
Start: 2019-11-19 | End: 2021-05-18

## 2019-11-20 ENCOUNTER — OFFICE VISIT (OUTPATIENT)
Dept: FAMILY MEDICINE CLINIC | Facility: CLINIC | Age: 54
End: 2019-11-20

## 2019-11-20 VITALS
OXYGEN SATURATION: 95 % | WEIGHT: 187 LBS | DIASTOLIC BLOOD PRESSURE: 80 MMHG | HEIGHT: 71 IN | BODY MASS INDEX: 26.18 KG/M2 | HEART RATE: 51 BPM | RESPIRATION RATE: 12 BRPM | TEMPERATURE: 96.9 F | SYSTOLIC BLOOD PRESSURE: 120 MMHG

## 2019-11-20 DIAGNOSIS — E11.42 TYPE 2 DIABETES MELLITUS WITH PERIPHERAL NEUROPATHY (HCC): Primary | ICD-10-CM

## 2019-11-20 DIAGNOSIS — I10 ESSENTIAL HYPERTENSION WITH GOAL BLOOD PRESSURE LESS THAN 140/90: ICD-10-CM

## 2019-11-20 LAB — HBA1C MFR BLD HPLC: 11.2 %

## 2019-11-20 RX ORDER — INSULIN GLARGINE 100 [IU]/ML
INJECTION, SOLUTION SUBCUTANEOUS
Qty: 5 ADJUSTABLE DOSE PRE-FILLED PEN SYRINGE | Refills: 3 | Status: SHIPPED | OUTPATIENT
Start: 2019-11-20

## 2019-11-20 RX ORDER — GABAPENTIN 800 MG/1
TABLET ORAL
Qty: 90 TAB | Refills: 0 | Status: SHIPPED | OUTPATIENT
Start: 2019-11-20

## 2019-11-20 RX ORDER — METFORMIN HYDROCHLORIDE 1000 MG/1
500 TABLET ORAL 2 TIMES DAILY WITH MEALS
Qty: 180 TAB | Refills: 1 | Status: SHIPPED | OUTPATIENT
Start: 2019-11-20

## 2019-11-20 RX ORDER — HYDROCHLOROTHIAZIDE 25 MG/1
25 TABLET ORAL DAILY
Qty: 30 TAB | Refills: 2 | Status: SHIPPED | OUTPATIENT
Start: 2019-11-20

## 2019-11-20 NOTE — PROGRESS NOTES
Visit Vitals  /89   Pulse (!) 51   Temp 96.9 °F (36.1 °C) (Oral)   Resp 12   Ht 5' 11\" (1.803 m)   Wt 187 lb (84.8 kg)   SpO2 95%   BMI 26.08 kg/m²     Ragini Gaming presents today for   Chief Complaint   Patient presents with    Diabetes     follow-up    Hypertension     follow-up       Is someone accompanying this pt? no    Is the patient using any DME equipment during [facility]? no    Depression Screening:  3 most recent PHQ Screens 12/29/2017   Little interest or pleasure in doing things Nearly every day   Feeling down, depressed, irritable, or hopeless Nearly every day   Total Score PHQ 2 6   Trouble falling or staying asleep, or sleeping too much Nearly every day   Feeling tired or having little energy Nearly every day   Poor appetite, weight loss, or overeating Not at all   Feeling bad about yourself - or that you are a failure or have let yourself or your family down Nearly every day   Trouble concentrating on things such as school, work, reading, or watching TV Nearly every day   Moving or speaking so slowly that other people could have noticed; or the opposite being so fidgety that others notice Not at all   Thoughts of being better off dead, or hurting yourself in some way Nearly every day   PHQ 9 Score 21   How difficult have these problems made it for you to do your work, take care of your home and get along with others Somewhat difficult       Learning Assessment:  Learning Assessment 8/1/2017   PRIMARY LEARNER Patient   HIGHEST LEVEL OF EDUCATION - PRIMARY LEARNER  -   CO-LEARNER CAREGIVER -   PRIMARY LANGUAGE ENGLISH   LEARNER PREFERENCE PRIMARY DEMONSTRATION   ANSWERED BY Patient   RELATIONSHIP SELF       Abuse Screening:  Abuse Screening Questionnaire 11/26/2018   Do you ever feel afraid of your partner? N   Are you in a relationship with someone who physically or mentally threatens you? N   Is it safe for you to go home? Y       Fall Risk  No flowsheet data found.     Health Maintenance reviewed and discussed and ordered per Provider. Health Maintenance Due   Topic Date Due    Shingrix Vaccine Age 49> (1 of 2) 11/24/2013    LIPID PANEL Q1  02/05/2017           Coordination of Care:  1. Have you been to the ER, urgent care clinic since your last visit? Hospitalized since your last visit? no    2. Have you seen or consulted any other health care providers outside of the [address] [place] [address] since your last visit? Include any pap smears or colon screening.  no

## 2019-11-20 NOTE — PROGRESS NOTES
Piedad Barnett is a 62 y.o. male presenting today for Diabetes (follow-up) and Hypertension (follow-up)    HPI:  Piedad Barnett presents to the office today for hypertension, hyperlipidemia and diabetes follow-up care. He presents today reporting he feels well. His last hemoglobin A1c is 12.2 and his hemoglobin A1c today is 11.2. He currently takes Trulicity, metformin and glipizide prescription. He is agreeable to starting Lantus daily and to start doing his Accu-Cheks twice daily. He has seen someone for his peripheral neuropathy and is gotten some stem cell therapy. He is waiting to see if his neuropathy improved but is requesting a refill of gabapentin. His blood pressure is controlled today. His blood pressure is 128/80 and he is negative for any chest pain, palpitation, or lower extremity edema. He was recently seen by cardiology and takes a atorvastatin 40 mg daily. Review of Systems   Respiratory: Negative for cough. Cardiovascular: Negative for chest pain, palpitations and leg swelling. Gastrointestinal: Negative for abdominal pain, nausea and vomiting. Neurological: Negative for dizziness and headaches. Endo/Heme/Allergies: Negative for polydipsia. No Known Allergies    Current Outpatient Medications   Medication Sig Dispense Refill    gabapentin (NEURONTIN) 800 mg tablet TAKE 1 TAB BY MOUTH 3 TIMES A DAY 90 Tab 0    hydroCHLOROthiazide (HYDRODIURIL) 25 mg tablet Take 1 Tab by mouth daily. Appointment needed to establish care  Indications: high blood pressure 30 Tab 2    flash glucose scanning reader (FREESTYLE BENSON 14 DAY READER) misc 1 Kit by Does Not Apply route three (3) times daily.  E11.9 4 Each 2    flash glucose sensor (FREESTYLE BENSON 14 DAY SENSOR) kit Take BG reading TID (E11.9) 5 Kit 1    insulin glargine (LANTUS,BASAGLAR) 100 unit/mL (3 mL) inpn Take 10 units nightly 5 Adjustable Dose Pre-filled Pen Syringe 3    metFORMIN (GLUCOPHAGE) 1,000 mg tablet Take 0.5 Tabs by mouth two (2) times daily (with meals). Indications: type 2 diabetes mellitus 180 Tab 1    atorvastatin (LIPITOR) 40 mg tablet Take 1 Tab by mouth daily. 90 Tab 3    clopidogrel (PLAVIX) 75 mg tab Take 1 Tab by mouth daily. 90 Tab 3    dulaglutide (TRULICITY) 6.28 XT/9.4 mL sub-q pen 0.5 mL by SubCUTAneous route every seven (7) days. 10 Pen 1    amLODIPine (NORVASC) 10 mg tablet TAKE 1 TABLET DAILY 90 Tab 1    ramipril (ALTACE) 10 mg capsule Take 1 Cap by mouth daily. Indications: high blood pressure 90 Cap 1    traZODone (DESYREL) 100 mg tablet TAKE 1 TAB BY MOUTH NIGHTLY. APPOINTMENT NEEDED TO ESTABLISH CARE 90 Tab 1    sertraline (ZOLOFT) 50 mg tablet TAKE 1 TAB BY MOUTH DAILY. APPOINTMENT NEEDED TO ESTABLISH CARE 30 Tab 2    fenofibrate nanocrystallized (TRICOR) 145 mg tablet TAKE 1 TABLET DAILY 90 Tab 1    Blood-Glucose Meter monitoring kit Use once daily as directed. 1 Kit 0    glucose blood VI test strips (BLOOD GLUCOSE TEST) strip Use daily as directed - must match his glucometer. 100 Strip 3    Lancets misc Use daily as directed 100 Each 3    aspirin delayed-release 81 mg tablet Take 81 mg by mouth daily.  MULTIVITAMIN PO Take 1 tablet by mouth every morning. Past Medical History:   Diagnosis Date    Abnormal nuclear cardiac imaging test 01/31/2012    Mod inferior infarction w/mild-mod simona-infarct ischemia. LVE. EF 43%. Marked basal inferior hypk; otherwise mild-mod inferior, inferolateral, distal anteroapical hypk. TID index 1.07. Pos max EST suggests subtotal obstruction of RCA.  Arthritis     Calculus of kidney     Chronic pain     Coronary artery disease     Inferior MI in 1998 status post RCA stent; ISR in 1999 status post rotational atherectomy. Posterior wall MI 2002 with circumflex stent.  Depression     Dyslipidemia     History of echocardiogram 10/09/2012    EF 50-55%. No WMA. Gr 1 DDfx. RVSP normal.  LAE.  IVCE.       Hyperlipidemia     Hypertension     mild    Long term current use of anticoagulant therapy     Neuropathy     Non-insulin dependent diabetes mellitus     Renal duplex 2013    No significant RA stenosis. Patent bilateral renal veins w/o thrombosis.  S/P attempted coronary angioplasty 10/08/2012    Unsuccessful angioplasty of chronic RCA occlusion w/collaterals.  S/P cardiac cath 2012    %. ISR. LM patent. LAD patent. pD1 55%. mCX patent. OM1 80% (3 x 30 [place] stent o/lapped w/3 x 12 [place] stent; residual 0%). LVEDP 13. EF 50-55%.          Past Surgical History:   Procedure Laterality Date    CARDIAC SURG PROCEDURE UNLIST      stents x3    COLONOSCOPY N/A 2019    COLONOS[facility] by [facility]ash L[facility]ola [facility] at [facility] ENDOSCOPY    HX APPENDECTOMY      HX UROLOGICAL Right     kidney stone    HX UROLOGICAL Bilateral     orchiopexy for torsion       Social History     Socioeconomic History    Marital status:      Spouse name: Not on file    Number of children: Not on file    Years of education: Not on file    Highest education level: Not on file   Occupational History    Not on file   Social Needs    Financial resource strain: Not on file    Food insecurity:     Worry: Not on file     Inability: Not on file    Transportation needs:     Medical: Not on file     Non-medical: Not on file   Tobacco Use    Smoking status: Former Smoker     Years: 20.00     Last attempt to quit: 2007     Years since quittin.8    Smokeless tobacco: Never Used   Substance and Sexual Activity    Alcohol use: No    Drug use: No    Sexual activity: Not Currently   Lifestyle    Physical activity:     Days per week: Not on file     Minutes per session: Not on file    Stress: Not on file   Relationships    Social connections:     Talks on phone: Not on file     Gets together: Not on file     Attends Mandaeism service: Not on file     Active member of club or organization: Not on file Attends meetings of clubs or organizations: Not on file     Relationship status: Not on file    Intimate partner violence:     Fear of current or ex partner: Not on file     Emotionally abused: Not on file     Physically abused: Not on file     Forced sexual activity: Not on file   Other Topics Concern    Not on file   Social History Narrative    Not on file       Patient does not have an advanced directive on file    Vitals:    11/20/19 1050 11/20/19 1144   BP: 143/89 120/80   Pulse: (!) 51    Resp: 12    Temp: 96.9 °F (36.1 °C)    TempSrc: Oral    SpO2: 95%    Weight: 187 lb (84.8 kg)    Height: 5' 11\" (1.803 m)    PainSc:   0 - No pain        Physical Exam  Constitutional:       Appearance: Normal appearance. Neck:      Musculoskeletal: Normal range of motion and neck supple. Cardiovascular:      Rate and Rhythm: Normal rate and regular rhythm. Pulmonary:      Effort: Pulmonary effort is normal.      Breath sounds: Normal breath sounds. Skin:     General: Skin is warm and dry. Neurological:      Mental Status: He is alert. Office Visit on 11/20/2019   Component Date Value Ref Range Status    Hemoglobin A1c (POC) 11/20/2019 11.2  % Final       Results for orders placed or performed in visit on 11/20/19   AMB POC HEMOGLOBIN A1C   Result Value Ref Range    Hemoglobin A1c (POC) 11.2 %       Assessment / Plan:      ICD-10-CM ICD-9-CM    1. Type 2 diabetes mellitus with peripheral neuropathy (HCC) E11.42 250.60 gabapentin (NEURONTIN) 800 mg tablet     357.2 AMB POC HEMOGLOBIN A1C      flash glucose scanning reader (FREESTYLE BENSON 14 DAY READER) misc      flash glucose sensor (FREESTYLE BENSON 14 DAY SENSOR) kit      insulin glargine (LANTUS,BASAGLAR) 100 unit/mL (3 mL) inpn      metFORMIN (GLUCOPHAGE) 1,000 mg tablet   2.  Essential hypertension with goal blood pressure less than 140/90 I10 401.9 hydroCHLOROthiazide (HYDRODIURIL) 25 mg tablet     Follow-up in 3 weeks for random glucose check  Lantus 10 units started and patient will increase by 2 units each day until 20 units if his blood glucose readings are greater than 200  Hypertension-controlled  Hyperlipidemia-continue atorvastatin  Follow-up and Dispositions    · Return in about 4 months (around 3/20/2020). I asked the patient if he  had any questions and answered his  questions. The patient stated that he understands the treatment plan and agrees with the treatment plan    This document was created with a voice activated dictation system and may contain transcription errors.

## 2019-11-26 ENCOUNTER — TELEPHONE (OUTPATIENT)
Dept: FAMILY MEDICINE CLINIC | Facility: CLINIC | Age: 54
End: 2019-11-26

## 2019-12-15 DIAGNOSIS — F32.2 SEVERE SINGLE CURRENT EPISODE OF MAJOR DEPRESSIVE DISORDER, WITHOUT PSYCHOTIC FEATURES (HCC): ICD-10-CM

## 2019-12-17 RX ORDER — SERTRALINE HYDROCHLORIDE 50 MG/1
TABLET, FILM COATED ORAL
Qty: 30 TAB | Refills: 2 | Status: SHIPPED | OUTPATIENT
Start: 2019-12-17

## 2019-12-18 DIAGNOSIS — E11.42 TYPE 2 DIABETES MELLITUS WITH PERIPHERAL NEUROPATHY (HCC): Primary | ICD-10-CM

## 2019-12-18 RX ORDER — PEN NEEDLE, DIABETIC 31 GX3/16"
1 NEEDLE, DISPOSABLE MISCELLANEOUS 2 TIMES DAILY
Qty: 200 PEN NEEDLE | Refills: 4 | Status: SHIPPED | OUTPATIENT
Start: 2019-12-18

## 2020-03-13 DIAGNOSIS — E11.42 TYPE 2 DIABETES MELLITUS WITH PERIPHERAL NEUROPATHY (HCC): Primary | ICD-10-CM

## 2020-05-10 ENCOUNTER — HOSPITAL ENCOUNTER (EMERGENCY)
Age: 55
Discharge: HOME | End: 2020-05-10
Attending: EMERGENCY MEDICINE
Payer: COMMERCIAL

## 2020-05-10 ENCOUNTER — APPOINTMENT (OUTPATIENT)
Dept: GENERAL RADIOLOGY | Age: 55
End: 2020-05-10
Attending: STUDENT IN AN ORGANIZED HEALTH CARE EDUCATION/TRAINING PROGRAM
Payer: COMMERCIAL

## 2020-05-10 VITALS
HEART RATE: 57 BPM | RESPIRATION RATE: 16 BRPM | DIASTOLIC BLOOD PRESSURE: 72 MMHG | OXYGEN SATURATION: 97 % | TEMPERATURE: 98.1 F | SYSTOLIC BLOOD PRESSURE: 132 MMHG

## 2020-05-10 DIAGNOSIS — R73.9 HYPERGLYCEMIA: Primary | ICD-10-CM

## 2020-05-10 DIAGNOSIS — R11.2 NON-INTRACTABLE VOMITING WITH NAUSEA, UNSPECIFIED VOMITING TYPE: ICD-10-CM

## 2020-05-10 DIAGNOSIS — R55 SYNCOPE AND COLLAPSE: ICD-10-CM

## 2020-05-10 LAB
ALBUMIN SERPL-MCNC: 3.9 G/DL (ref 3.4–5)
ALBUMIN/GLOB SERPL: 1.3 {RATIO} (ref 0.8–1.7)
ALP SERPL-CCNC: 76 U/L (ref 45–117)
ALT SERPL-CCNC: 31 U/L (ref 16–61)
ANION GAP SERPL CALC-SCNC: 6 MMOL/L (ref 3–18)
APPEARANCE UR: CLEAR
AST SERPL-CCNC: 14 U/L (ref 10–38)
BACTERIA URNS QL MICRO: ABNORMAL /HPF
BASOPHILS # BLD: 0.1 K/UL (ref 0–0.1)
BASOPHILS NFR BLD: 0 % (ref 0–2)
BILIRUB SERPL-MCNC: 0.4 MG/DL (ref 0.2–1)
BILIRUB UR QL: NEGATIVE
BUN SERPL-MCNC: 16 MG/DL (ref 7–18)
BUN/CREAT SERPL: 14 (ref 12–20)
CALCIUM SERPL-MCNC: 9 MG/DL (ref 8.5–10.1)
CHLORIDE SERPL-SCNC: 104 MMOL/L (ref 100–111)
CK MB CFR SERPL CALC: 2.1 % (ref 0–4)
CK MB SERPL-MCNC: 1.5 NG/ML (ref 5–25)
CK SERPL-CCNC: 73 U/L (ref 39–308)
CO2 SERPL-SCNC: 32 MMOL/L (ref 21–32)
COLOR UR: YELLOW
CREAT SERPL-MCNC: 1.15 MG/DL (ref 0.6–1.3)
DIFFERENTIAL METHOD BLD: ABNORMAL
EOSINOPHIL # BLD: 0.3 K/UL (ref 0–0.4)
EOSINOPHIL NFR BLD: 2 % (ref 0–5)
EPITH CASTS URNS QL MICRO: ABNORMAL /LPF (ref 0–5)
ERYTHROCYTE [DISTWIDTH] IN BLOOD BY AUTOMATED COUNT: 13 % (ref 11.6–14.5)
GLOBULIN SER CALC-MCNC: 3 G/DL (ref 2–4)
GLUCOSE BLD STRIP.AUTO-MCNC: 319 MG/DL (ref 70–110)
GLUCOSE SERPL-MCNC: 374 MG/DL (ref 74–99)
GLUCOSE UR STRIP.AUTO-MCNC: >1000 MG/DL
HCT VFR BLD AUTO: 39.5 % (ref 36–48)
HGB BLD-MCNC: 14.1 G/DL (ref 13–16)
HGB UR QL STRIP: NEGATIVE
HYALINE CASTS URNS QL MICRO: ABNORMAL /LPF (ref 0–2)
KETONES UR QL STRIP.AUTO: NEGATIVE MG/DL
LEUKOCYTE ESTERASE UR QL STRIP.AUTO: NEGATIVE
LIPASE SERPL-CCNC: 344 U/L (ref 73–393)
LYMPHOCYTES # BLD: 1.6 K/UL (ref 0.9–3.6)
LYMPHOCYTES NFR BLD: 12 % (ref 21–52)
MAGNESIUM SERPL-MCNC: 2 MG/DL (ref 1.6–2.6)
MCH RBC QN AUTO: 30.7 PG (ref 24–34)
MCHC RBC AUTO-ENTMCNC: 35.7 G/DL (ref 31–37)
MCV RBC AUTO: 86.1 FL (ref 74–97)
MONOCYTES # BLD: 0.7 K/UL (ref 0.05–1.2)
MONOCYTES NFR BLD: 5 % (ref 3–10)
NEUTS SEG # BLD: 10.8 K/UL (ref 1.8–8)
NEUTS SEG NFR BLD: 81 % (ref 40–73)
NITRITE UR QL STRIP.AUTO: NEGATIVE
PH UR STRIP: 5 [PH] (ref 5–8)
PLATELET # BLD AUTO: 198 K/UL (ref 135–420)
PMV BLD AUTO: 9.9 FL (ref 9.2–11.8)
POTASSIUM SERPL-SCNC: 3.9 MMOL/L (ref 3.5–5.5)
PROT SERPL-MCNC: 6.9 G/DL (ref 6.4–8.2)
PROT UR STRIP-MCNC: 100 MG/DL
RBC # BLD AUTO: 4.59 M/UL (ref 4.7–5.5)
RBC #/AREA URNS HPF: ABNORMAL /HPF (ref 0–5)
SODIUM SERPL-SCNC: 142 MMOL/L (ref 136–145)
SP GR UR REFRACTOMETRY: 1.03 (ref 1–1.03)
TROPONIN I SERPL-MCNC: <0.02 NG/ML (ref 0–0.04)
UROBILINOGEN UR QL STRIP.AUTO: 0.2 EU/DL (ref 0.2–1)
WBC # BLD AUTO: 13.5 K/UL (ref 4.6–13.2)
WBC URNS QL MICRO: ABNORMAL /HPF (ref 0–4)

## 2020-05-10 PROCEDURE — 82962 GLUCOSE BLOOD TEST: CPT

## 2020-05-10 PROCEDURE — 71045 X-RAY EXAM CHEST 1 VIEW: CPT

## 2020-05-10 PROCEDURE — 82550 ASSAY OF CK (CPK): CPT

## 2020-05-10 PROCEDURE — 74011250637 HC RX REV CODE- 250/637: Performed by: STUDENT IN AN ORGANIZED HEALTH CARE EDUCATION/TRAINING PROGRAM

## 2020-05-10 PROCEDURE — 74011250636 HC RX REV CODE- 250/636: Performed by: STUDENT IN AN ORGANIZED HEALTH CARE EDUCATION/TRAINING PROGRAM

## 2020-05-10 PROCEDURE — 96374 THER/PROPH/DIAG INJ IV PUSH: CPT

## 2020-05-10 PROCEDURE — 93005 ELECTROCARDIOGRAM TRACING: CPT

## 2020-05-10 PROCEDURE — 99285 EMERGENCY DEPT VISIT HI MDM: CPT

## 2020-05-10 PROCEDURE — 80053 COMPREHEN METABOLIC PANEL: CPT

## 2020-05-10 PROCEDURE — 85025 COMPLETE CBC W/AUTO DIFF WBC: CPT

## 2020-05-10 PROCEDURE — 83735 ASSAY OF MAGNESIUM: CPT

## 2020-05-10 PROCEDURE — 81001 URINALYSIS AUTO W/SCOPE: CPT

## 2020-05-10 PROCEDURE — 83690 ASSAY OF LIPASE: CPT

## 2020-05-10 PROCEDURE — 96361 HYDRATE IV INFUSION ADD-ON: CPT

## 2020-05-10 RX ORDER — GUAIFENESIN 100 MG/5ML
162 LIQUID (ML) ORAL
Status: COMPLETED | OUTPATIENT
Start: 2020-05-10 | End: 2020-05-10

## 2020-05-10 RX ORDER — ONDANSETRON 2 MG/ML
4 INJECTION INTRAMUSCULAR; INTRAVENOUS
Status: COMPLETED | OUTPATIENT
Start: 2020-05-10 | End: 2020-05-10

## 2020-05-10 RX ORDER — ONDANSETRON 4 MG/1
4 TABLET, ORALLY DISINTEGRATING ORAL
Qty: 10 TAB | Refills: 0 | Status: SHIPPED | OUTPATIENT
Start: 2020-05-10 | End: 2020-05-14

## 2020-05-10 RX ORDER — ONDANSETRON 2 MG/ML
INJECTION INTRAMUSCULAR; INTRAVENOUS
Status: DISCONTINUED
Start: 2020-05-10 | End: 2020-05-10 | Stop reason: HOSPADM

## 2020-05-10 RX ADMIN — ONDANSETRON 4 MG: 2 INJECTION, SOLUTION INTRAMUSCULAR; INTRAVENOUS at 16:19

## 2020-05-10 RX ADMIN — SODIUM CHLORIDE 500 ML: 900 INJECTION, SOLUTION INTRAVENOUS at 16:46

## 2020-05-10 RX ADMIN — ASPIRIN 81 MG CHEWABLE TABLET 162 MG: 81 TABLET CHEWABLE at 16:35

## 2020-05-10 RX ADMIN — SODIUM CHLORIDE 500 ML: 900 INJECTION, SOLUTION INTRAVENOUS at 17:42

## 2020-05-10 NOTE — ED NOTES
Pt resting in stretcher with no signs of distress. VS stable. Plan of care reviewed and pt denies needs or questions at this time. Will continue to monitor.

## 2020-05-10 NOTE — ED NOTES
Pt resting in stretcher with no signs of distress. VS stable. Provided water per request. Plan of care reviewed and pt denies questions at this time. Will continue to monitor.

## 2020-05-10 NOTE — ED PROVIDER NOTES
EMERGENCY DEPARTMENT HISTORY AND PHYSICAL EXAM      Date: 5/10/2020  Patient Name: Kecia Alston    History of Presenting Illness     Chief Complaint   Patient presents with    Syncope       History Provided By: Patient    History: Kecia Alston is a 62 y.o. male with diabetes who presents via EMS from home after syncopal event. Patient states that he felt acutely nauseous and threw up while he was sitting at his desk at home. He then awoke to EMS. He denies any chemical ingestion, alcohol consumption, eating any abnormal foods. He also denies abdominal pain. He states he vomited multiple times in route in the back of the ambulance. His emesis is non-bloody, nonbilious. He reports history of MI 20 years ago. Patient denies chest pain, shortness of breath, diaphoresis, upper extremity pain, neck pain. Patient states that he is a diabetic and does not adhere to his insulin regiment as he should. He also states that he has not been eating a healthy diet lately. PCP: Justine Sandoval NP    Current Outpatient Medications   Medication Sig Dispense Refill    TRULICITY 6.47 KU/9.6 mL sub-q pen 0.5 ML BY SUBCUTANEOUS ROUTE EVERY SEVEN (7) DAYS. 6 Pen 3    Insulin Needles, Disposable, (SHADE PEN NEEDLE) 32 gauge x 5/32\" ndle 1 Units by Does Not Apply route two (2) times a day. 200 Pen Needle 4    sertraline (ZOLOFT) 50 mg tablet TAKE 1 TAB BY MOUTH DAILY. APPOINTMENT NEEDED TO ESTABLISH CARE 30 Tab 2    gabapentin (NEURONTIN) 800 mg tablet TAKE 1 TAB BY MOUTH 3 TIMES A DAY 90 Tab 0    hydroCHLOROthiazide (HYDRODIURIL) 25 mg tablet Take 1 Tab by mouth daily. Appointment needed to establish care  Indications: high blood pressure 30 Tab 2    flash glucose scanning reader (FREESTYLE BENSON 14 DAY READER) misc 1 Kit by Does Not Apply route three (3) times daily.  E11.9 4 Each 2    flash glucose sensor (FREESTYLE BENSON 14 DAY SENSOR) kit Take BG reading TID (E11.9) 5 Kit 1    insulin glargine (LANTUS,BASAGLAR) 100 unit/mL (3 mL) inpn Take 10 units nightly 5 Adjustable Dose Pre-filled Pen Syringe 3    metFORMIN (GLUCOPHAGE) 1,000 mg tablet Take 0.5 Tabs by mouth two (2) times daily (with meals). Indications: type 2 diabetes mellitus 180 Tab 1    atorvastatin (LIPITOR) 40 mg tablet Take 1 Tab by mouth daily. 90 Tab 3    clopidogrel (PLAVIX) 75 mg tab Take 1 Tab by mouth daily. 90 Tab 3    amLODIPine (NORVASC) 10 mg tablet TAKE 1 TABLET DAILY 90 Tab 1    ramipril (ALTACE) 10 mg capsule Take 1 Cap by mouth daily. Indications: high blood pressure 90 Cap 1    traZODone (DESYREL) 100 mg tablet TAKE 1 TAB BY MOUTH NIGHTLY. APPOINTMENT NEEDED TO ESTABLISH CARE 90 Tab 1    fenofibrate nanocrystallized (TRICOR) 145 mg tablet TAKE 1 TABLET DAILY 90 Tab 1    Blood-Glucose Meter monitoring kit Use once daily as directed. 1 Kit 0    glucose blood VI test strips (BLOOD GLUCOSE TEST) strip Use daily as directed - must match his glucometer. 100 Strip 3    Lancets misc Use daily as directed 100 Each 3    aspirin delayed-release 81 mg tablet Take 81 mg by mouth daily.  MULTIVITAMIN PO Take 1 tablet by mouth every morning. Past History     Past Medical History:  Past Medical History:   Diagnosis Date    Abnormal nuclear cardiac imaging test 01/31/2012    Mod inferior infarction w/mild-mod simona-infarct ischemia. LVE. EF 43%. Marked basal inferior hypk; otherwise mild-mod inferior, inferolateral, distal anteroapical hypk. TID index 1.07. Pos max EST suggests subtotal obstruction of RCA.  Arthritis     Calculus of kidney     Chronic pain     Coronary artery disease     Inferior MI in 1998 status post RCA stent; ISR in 1999 status post rotational atherectomy. Posterior wall MI 2002 with circumflex stent.  Depression     Dyslipidemia     History of echocardiogram 10/09/2012    EF 50-55%. No WMA. Gr 1 DDfx. RVSP normal.  LAE.  IVCE.       Hyperlipidemia     Hypertension     mild    Long term current use of anticoagulant therapy     Neuropathy     Non-insulin dependent diabetes mellitus     Renal duplex 2013    No significant RA stenosis. Patent bilateral renal veins w/o thrombosis.  S/P attempted coronary angioplasty 10/08/2012    Unsuccessful angioplasty of chronic RCA occlusion w/collaterals.  S/P cardiac cath 2012    %. ISR. LM patent. LAD patent. pD1 55%. mCX patent. OM1 80% (3 x 30 [place] stent o/lapped w/3 x 12 [place] stent; residual 0%). LVEDP 13. EF 50-55%. Past Surgical History:  Past Surgical History:   Procedure Laterality Date    CARDIAC SURG PROCEDURE UNLIST      stents x3    COLONOSCOPY N/A 2019    COL[facility] at [facility] ENDOSCOPY    HX APPENDECTOMY      HX UROLOGICAL Right     kidney stone    HX UROLOGICAL Bilateral     orchiopexy for torsion       Family History:  Family History   Problem Relation Age of Onset    Heart Disease Mother     Diabetes Father     Heart Disease Maternal Grandfather        Social History:  Social History     Tobacco Use    Smoking status: Former Smoker     Years: 20.00     Last attempt to quit: 2007     Years since quittin.3    Smokeless tobacco: Never Used   Substance Use Topics    Alcohol use: No    Drug use: No       Allergies:  No Known Allergies      Review of Systems   Review of Systems   Constitutional: Negative for chills, fatigue and fever. HENT: Negative for sinus pressure, sinus pain, sore throat and tinnitus. Eyes: Negative for visual disturbance. Respiratory: Negative for chest tightness, shortness of breath and wheezing. Cardiovascular: Negative for chest pain and palpitations. Gastrointestinal: Positive for nausea and vomiting. Negative for abdominal pain, blood in stool and diarrhea. Genitourinary: Negative for difficulty urinating, dysuria, flank pain, frequency and testicular pain.    Musculoskeletal: Negative for myalgias and neck stiffness. Skin: Negative for rash. Neurological: Positive for syncope. Negative for seizures, speech difficulty, weakness, light-headedness, numbness and headaches. Hematological: Does not bruise/bleed easily. Psychiatric/Behavioral: Negative for agitation and confusion. The patient is not nervous/anxious. Physical Exam     Vitals:    05/10/20 1620 05/10/20 1630 05/10/20 1700 05/10/20 1720   BP: 153/68 146/65 139/66 157/75   Pulse:   (!) 50 (!) 57   Resp:   8 14   Temp:       SpO2:  95% 98% 94%     Physical Exam  Vitals signs and nursing note reviewed. Constitutional:       Appearance: Normal appearance. He is normal weight. HENT:      Head: Normocephalic and atraumatic. Mouth/Throat:      Mouth: Mucous membranes are moist.      Pharynx: Oropharynx is clear. Eyes:      Extraocular Movements: Extraocular movements intact. Pupils: Pupils are equal, round, and reactive to light. Neck:      Musculoskeletal: Normal range of motion. No neck rigidity. Cardiovascular:      Rate and Rhythm: Normal rate and regular rhythm. Pulses: Normal pulses. Heart sounds: Normal heart sounds. Pulmonary:      Effort: Pulmonary effort is normal.      Breath sounds: Normal breath sounds. Abdominal:      General: Abdomen is flat. There is no distension. Palpations: Abdomen is soft. There is no mass. Tenderness: There is no abdominal tenderness. There is no guarding or rebound. Musculoskeletal: Normal range of motion. Right lower leg: No edema. Left lower leg: No edema. Lymphadenopathy:      Cervical: No cervical adenopathy. Skin:     General: Skin is warm and dry. Capillary Refill: Capillary refill takes less than 2 seconds. Coloration: Skin is pale. Neurological:      General: No focal deficit present. Mental Status: He is alert and oriented to person, place, and time. Cranial Nerves: No cranial nerve deficit.       Sensory: No sensory deficit. Motor: No weakness. Coordination: Coordination normal.   Psychiatric:         Mood and Affect: Mood normal.         Behavior: Behavior normal.           Diagnostic Study Results     Labs -     Recent Results (from the past 12 hour(s))   CBC WITH AUTOMATED DIFF    Collection Time: 05/10/20  4:11 PM   Result Value Ref Range    WBC 13.5 (H) 4.6 - 13.2 K/uL    RBC 4.59 (L) 4.70 - 5.50 M/uL    HGB 14.1 13.0 - 16.0 g/dL    HCT 39.5 36.0 - 48.0 %    MCV 86.1 74.0 - 97.0 FL    MCH 30.7 24.0 - 34.0 PG    MCHC 35.7 31.0 - 37.0 g/dL    RDW 13.0 11.6 - 14.5 %    PLATELET 781 985 - 606 K/uL    MPV 9.9 9.2 - 11.8 FL    NEUTROPHILS 81 (H) 40 - 73 %    LYMPHOCYTES 12 (L) 21 - 52 %    MONOCYTES 5 3 - 10 %    EOSINOPHILS 2 0 - 5 %    BASOPHILS 0 0 - 2 %    ABS. NEUTROPHILS 10.8 (H) 1.8 - 8.0 K/UL    ABS. LYMPHOCYTES 1.6 0.9 - 3.6 K/UL    ABS. MONOCYTES 0.7 0.05 - 1.2 K/UL    ABS. EOSINOPHILS 0.3 0.0 - 0.4 K/UL    ABS. BASOPHILS 0.1 0.0 - 0.1 K/UL    DF AUTOMATED     METABOLIC PANEL, COMPREHENSIVE    Collection Time: 05/10/20  4:11 PM   Result Value Ref Range    Sodium 142 136 - 145 mmol/L    Potassium 3.9 3.5 - 5.5 mmol/L    Chloride 104 100 - 111 mmol/L    CO2 32 21 - 32 mmol/L    Anion gap 6 3.0 - 18 mmol/L    Glucose 374 (H) 74 - 99 mg/dL    BUN 16 7.0 - 18 MG/DL    Creatinine 1.15 0.6 - 1.3 MG/DL    BUN/Creatinine ratio 14 12 - 20      GFR est AA >60 >60 ml/min/1.73m2    GFR est non-AA >60 >60 ml/min/1.73m2    Calcium 9.0 8.5 - 10.1 MG/DL    Bilirubin, total 0.4 0.2 - 1.0 MG/DL    ALT (SGPT) 31 16 - 61 U/L    AST (SGOT) 14 10 - 38 U/L    Alk.  phosphatase 76 45 - 117 U/L    Protein, total 6.9 6.4 - 8.2 g/dL    Albumin 3.9 3.4 - 5.0 g/dL    Globulin 3.0 2.0 - 4.0 g/dL    A-G Ratio 1.3 0.8 - 1.7     MAGNESIUM    Collection Time: 05/10/20  4:11 PM   Result Value Ref Range    Magnesium 2.0 1.6 - 2.6 mg/dL   CARDIAC PANEL,(CK, CKMB & TROPONIN)    Collection Time: 05/10/20  4:11 PM   Result Value Ref Range CK 73 39 - 308 U/L    CK - MB 1.5 <3.6 ng/ml    CK-MB Index 2.1 0.0 - 4.0 %    Troponin-I, QT <0.02 0.0 - 0.045 NG/ML   LIPASE    Collection Time: 05/10/20  4:11 PM   Result Value Ref Range    Lipase 344 73 - 393 U/L   EKG, 12 LEAD, INITIAL    Collection Time: 05/10/20  4:23 PM   Result Value Ref Range    Ventricular Rate 66 BPM    Atrial Rate 66 BPM    P-R Interval 360 ms    QRS Duration 116 ms    Q-T Interval 428 ms    QTC Calculation (Bezet) 448 ms    Calculated P Axis 53 degrees    Calculated R Axis 18 degrees    Calculated T Axis -43 degrees    Diagnosis       Sinus rhythm with 1st degree AV block  Inferior infarct (cited on or before 16-JUL-2010)  Abnormal ECG  When compared with ECG of 17-APR-2013 11:05,  Nonspecific T wave abnormality, worse in Inferior leads         Radiologic Studies -   XR CHEST PORT    (Results Pending)     CT Results  (Last 48 hours)    None        CXR Results  (Last 48 hours)    None            Medical Decision Making   I am the first provider for this patient. I reviewed the vital signs, available nursing notes, past medical history, past surgical history, family history and social history. EKG: Interpreted by the EP. Time Interpreted: 0841   Rate: 66   Rhythm: sinus   Interpretation: sinus rhythm with 1st degree AV block   Comparison: 10/09/2018    Records Reviewed: Nursing Notes, Old Medical Records and Previous electrocardiograms    ED Course:   ED Course as of May 10 2010   Sun May 10, 2020   2007 Patient has improved. VS are stable. No episodes of vomiting. He is ready to go home. [SJ]      ED Course User Index  [SJ] Samuel Galindo MD         Disposition:  Discharged home    DISCHARGE NOTE:   Pt has been reexamined and his condition has improved. Patient has no new complaints, changes, or physical findings. Care plan outlined and precautions discussed. Results of labs, EKG, and CXR were reviewed with the patient.  All medications were reviewed with the patient; will d/c home with zofran and cardiac holter monitor. All of pt's questions and concerns were addressed. Patient was instructed and agrees to follow up with PCP, as well as to return to the ED upon further deterioration. Patient is ready to go home. Follow-up Information    None         Current Discharge Medication List          Provider Notes (Medical Decision Making):   57-year-old male with past medical history of coronary artery disease and diabetes presents with acute onset nausea and vomiting and presumed syncopal event at home. Upon arrival patient appears pale and in mild distress. He is vomiting food colored emesis. Patient given 4 mg of Zofran IV with improvement of nausea vomiting. Labs revealed glucose of 374. Patient does not have an anion gap and does not have fruity odor on his breath. Urine is positive for greater than 1000 glucose but no ketones. Patient's vital signs have remained within normal limits throughout stay. Patient received 1 L of normal saline. He did not have any abdominal pain, chest pain, shortness of breath. However due to diabetic history cardiac work-up was obtained which was negative for STEMI on EKG and troponin was negative. Patient does have first-degree heart block on EKG which is similar to previous EKG in 2018. Patient states he has worn a Holter monitor in past for \"heart problems. \"  He states he has never passed out before. Repeat glucose was 314. Patient improved throughout ER stay. He is ready to go home. He understands should adhere to his insulin regimen. He will follow-up with his primary care doctor to adjust his insulin regimen appropriately. He is given Zofran to go home with as well as prescription for cardiac Holter monitor. Diagnosis     Clinical Impression:   1. Hyperglycemia    2. Non-intractable vomiting with nausea, unspecified vomiting type    3.  Syncope and collapse

## 2020-05-10 NOTE — ED NOTES
Pt placed on 3L O2 NC due to desat to 55% (good waveform) while sleeping on RA. Pt with quick return to SpO2 in 95-97% range when woken up.

## 2020-05-10 NOTE — ED TRIAGE NOTES
Pt BIB EMS from home for episode of unresponsiveness at home. Family was able to wake patient and patient was complaining of nausea. Vomit non-bloody. Pt pale, alert and oriented.  for EMS. BP in 100s. Afebrile. Complaining of dizziness x several days. Denies CP, SOB. Actively vomiting upon arrival.    Past Medical History:   Diagnosis Date    Abnormal nuclear cardiac imaging test 01/31/2012    Mod inferior infarction w/mild-mod simona-infarct ischemia. LVE. EF 43%. Marked basal inferior hypk; otherwise mild-mod inferior, inferolateral, distal anteroapical hypk. TID index 1.07. Pos max EST suggests subtotal obstruction of RCA.  Arthritis     Calculus of kidney     Chronic pain     Coronary artery disease     Inferior MI in 1998 status post RCA stent; ISR in 1999 status post rotational atherectomy. Posterior wall MI 2002 with circumflex stent.  Depression     Dyslipidemia     History of echocardiogram 10/09/2012    EF 50-55%. No WMA. Gr 1 DDfx. RVSP normal.  LAE.  IVCE.  Hyperlipidemia     Hypertension     mild    Long term current use of anticoagulant therapy     Neuropathy     Non-insulin dependent diabetes mellitus     Renal duplex 12/02/2013    No significant RA stenosis. Patent bilateral renal veins w/o thrombosis.  S/P attempted coronary angioplasty 10/08/2012    Unsuccessful angioplasty of chronic RCA occlusion w/collaterals.  S/P cardiac cath 02/17/2012    %. ISR. LM patent. LAD patent. pD1 55%. mCX patent. OM1 80% (3 x 30 [place] stent o/lapped w/3 x 12 [place] stent; residual 0%). LVEDP 13. EF 50-55%.

## 2020-05-11 ENCOUNTER — PATIENT OUTREACH (OUTPATIENT)
Dept: CASE MANAGEMENT | Age: 55
End: 2020-05-11

## 2020-05-11 LAB
ATRIAL RATE: 66 BPM
CALCULATED P AXIS, ECG09: 53 DEGREES
CALCULATED R AXIS, ECG10: 18 DEGREES
CALCULATED T AXIS, ECG11: -43 DEGREES
DIAGNOSIS, 93000: NORMAL
P-R INTERVAL, ECG05: 360 MS
Q-T INTERVAL, ECG07: 428 MS
QRS DURATION, ECG06: 116 MS
QTC CALCULATION (BEZET), ECG08: 448 MS
VENTRICULAR RATE, ECG03: 66 BPM

## 2020-05-11 NOTE — PROGRESS NOTES
STEPHEN has been assigned to follow up with the patient for Covid care post recent ED visit. STEPHEN attempted to contact the patient and was informed that he was in the bathroom. STEPHEN was asked to call back at a later time.

## 2020-05-11 NOTE — PROGRESS NOTES
MSW has been assigned to follow up with the patient for Covid care post recent ED visit. MSW attempted to contact the patient and voicemail indicated that he is not available with no option to leave message. MSW will re-attempt to contact the patient at a later time.

## 2020-05-12 ENCOUNTER — PATIENT OUTREACH (OUTPATIENT)
Dept: CASE MANAGEMENT | Age: 55
End: 2020-05-12

## 2020-05-12 NOTE — PROGRESS NOTES
MSW attempted to contact the patient to complete initial follow up post recent ED visit. MSW left message with introduction of self, role and reason for call on mobile/work and home phone. This has been the 2nd attempt. MSW will discontinue follow up calls at this time.

## 2020-05-13 ENCOUNTER — HOSPITAL ENCOUNTER (OUTPATIENT)
Dept: NON INVASIVE DIAGNOSTICS | Age: 55
Discharge: HOME | End: 2020-05-13
Attending: STUDENT IN AN ORGANIZED HEALTH CARE EDUCATION/TRAINING PROGRAM
Payer: COMMERCIAL

## 2020-05-13 DIAGNOSIS — R55 SYNCOPE AND COLLAPSE: ICD-10-CM

## 2020-05-13 PROCEDURE — 93226 XTRNL ECG REC<48 HR SCAN A/R: CPT

## 2021-01-13 ENCOUNTER — OFFICE VISIT (OUTPATIENT)
Dept: ORTHOPEDIC SURGERY | Age: 56
End: 2021-01-13
Payer: COMMERCIAL

## 2021-01-13 VITALS
BODY MASS INDEX: 28.28 KG/M2 | DIASTOLIC BLOOD PRESSURE: 82 MMHG | WEIGHT: 202 LBS | TEMPERATURE: 98.2 F | HEART RATE: 65 BPM | OXYGEN SATURATION: 96 % | HEIGHT: 71 IN | SYSTOLIC BLOOD PRESSURE: 144 MMHG

## 2021-01-13 DIAGNOSIS — G56.23 CUBITAL TUNNEL SYNDROME, BILATERAL: Primary | ICD-10-CM

## 2021-01-13 PROCEDURE — 99203 OFFICE O/P NEW LOW 30 MIN: CPT | Performed by: ORTHOPAEDIC SURGERY

## 2021-01-13 RX ORDER — ROPINIROLE 0.5 MG/1
0.5 TABLET, FILM COATED ORAL
COMMUNITY

## 2021-01-13 NOTE — PROGRESS NOTES
Zoila Mitchell is a 60 y.o. male right handed unspecified employment. Worker's Compensation and legal considerations: none filed. Vitals:    01/13/21 1543   BP: (!) 144/82   Pulse: 65   Temp: 98.2 °F (36.8 °C)   SpO2: 96%   Weight: 202 lb (91.6 kg)   Height: 5' 11\" (1.803 m)   PainSc:   3   PainLoc: Hand           Chief Complaint   Patient presents with    Hand Pain     bilat         HPI: Patient comes in today with complaints of bilateral hand weakness and numbness and tingling in the little finger and ring finger. Date of onset: July 2020    Injury: No    Prior Treatment:  No    Numbness/ Tingling: Yes: Comment: Bilateral ring and little finger      ROS: Review of Systems - General ROS: negative  Psychological ROS: negative  ENT ROS: negative  Allergy and Immunology ROS: negative  Hematological and Lymphatic ROS: negative  Respiratory ROS: no cough, shortness of breath, or wheezing  Cardiovascular ROS: no chest pain or dyspnea on exertion  Gastrointestinal ROS: no abdominal pain, change in bowel habits, or black or bloody stools  Musculoskeletal ROS: positive for - pain in hand - bilateral  Neurological ROS: positive for - numbness/tingling and weakness  Dermatological ROS: negative    Past Medical History:   Diagnosis Date    Abnormal nuclear cardiac imaging test 01/31/2012    Mod inferior infarction w/mild-mod simona-infarct ischemia. LVE. EF 43%. Marked basal inferior hypk; otherwise mild-mod inferior, inferolateral, distal anteroapical hypk. TID index 1.07. Pos max EST suggests subtotal obstruction of RCA.  Arthritis     Calculus of kidney     Chronic pain     Coronary artery disease     Inferior MI in 1998 status post RCA stent; ISR in 1999 status post rotational atherectomy. Posterior wall MI 2002 with circumflex stent.  Depression     Dyslipidemia     History of echocardiogram 10/09/2012    EF 50-55%. No WMA. Gr 1 DDfx. RVSP normal.  LAE.  IVCE.       Hyperlipidemia     Hypertension     mild    Long term current use of anticoagulant therapy     Neuropathy     Non-insulin dependent diabetes mellitus     Renal duplex 12/02/2013    No significant RA stenosis. Patent bilateral renal veins w/o thrombosis.  S/P attempted coronary angioplasty 10/08/2012    Unsuccessful angioplasty of chronic RCA occlusion w/collaterals.  S/P cardiac cath 02/17/2012    %. ISR. LM patent. LAD patent. pD1 55%. mCX patent. OM1 80% (3 x 30 [place] stent o/lapped w/3 x 12 [place] stent; residual 0%). LVEDP 13. EF 50-55%. Past Surgical History:   Procedure Laterality Date    CARDIAC SURG PROCEDURE UNLIST      stents x3    COLONOSCOPY N/A 4/24/2019    COLONOSCOPY performed by Danny Caruso MD at [facility] ENDOSCOPY    HX APPENDECTOMY      HX UROLOGICAL Right     kidney stone    HX UROLOGICAL Bilateral     orchiopexy for torsion       Current Outpatient Medications   Medication Sig Dispense Refill    rOPINIRole (REQUIP) 0.5 mg tablet Take 0.5 mg by mouth once over twenty-four (24) hours.  TRULICITY 1.34 DT/0.1 mL sub-q pen 0.5 ML BY SUBCUTANEOUS ROUTE EVERY SEVEN (7) DAYS. 6 Pen 3    Insulin Needles, Disposable, (SHADE PEN NEEDLE) 32 gauge x 5/32\" ndle 1 Units by Does Not Apply route two (2) times a day. 200 Pen Needle 4    sertraline (ZOLOFT) 50 mg tablet TAKE 1 TAB BY MOUTH DAILY. APPOINTMENT NEEDED TO ESTABLISH CARE 30 Tab 2    hydroCHLOROthiazide (HYDRODIURIL) 25 mg tablet Take 1 Tab by mouth daily. Appointment needed to establish care  Indications: high blood pressure 30 Tab 2    flash glucose scanning reader (FREESTYLE BENSON 14 DAY READER) misc 1 Kit by Does Not Apply route three (3) times daily.  E11.9 4 Each 2    flash glucose sensor (FREESTYLE BENSON 14 DAY SENSOR) kit Take BG reading TID (E11.9) 5 Kit 1    insulin glargine (LANTUS,BASAGLAR) 100 unit/mL (3 mL) inpn Take 10 units nightly 5 Adjustable Dose Pre-filled Pen Syringe 3    metFORMIN (GLUCOPHAGE) 1,000 mg tablet Take 0.5 Tabs by mouth two (2) times daily (with meals). Indications: type 2 diabetes mellitus 180 Tab 1    atorvastatin (LIPITOR) 40 mg tablet Take 1 Tab by mouth daily. 90 Tab 3    clopidogrel (PLAVIX) 75 mg tab Take 1 Tab by mouth daily. 90 Tab 3    amLODIPine (NORVASC) 10 mg tablet TAKE 1 TABLET DAILY 90 Tab 1    ramipril (ALTACE) 10 mg capsule Take 1 Cap by mouth daily. Indications: high blood pressure 90 Cap 1    traZODone (DESYREL) 100 mg tablet TAKE 1 TAB BY MOUTH NIGHTLY. APPOINTMENT NEEDED TO ESTABLISH CARE 90 Tab 1    fenofibrate nanocrystallized (TRICOR) 145 mg tablet TAKE 1 TABLET DAILY 90 Tab 1    Blood-Glucose Meter monitoring kit Use once daily as directed. 1 Kit 0    glucose blood VI test strips (BLOOD GLUCOSE TEST) strip Use daily as directed - must match his glucometer. 100 Strip 3    Lancets misc Use daily as directed 100 Each 3    aspirin delayed-release 81 mg tablet Take 81 mg by mouth daily.  MULTIVITAMIN PO Take 1 tablet by mouth every morning.  gabapentin (NEURONTIN) 800 mg tablet TAKE 1 TAB BY MOUTH 3 TIMES A DAY 90 Tab 0       No Known Allergies        PE:     Physical Exam  Vitals signs and nursing note reviewed. Constitutional:       General: He is not in acute distress. Appearance: Normal appearance. He is not ill-appearing. Eyes:      Extraocular Movements: Extraocular movements intact. Pupils: Pupils are equal, round, and reactive to light. Neck:      Musculoskeletal: Normal range of motion and neck supple. Abdominal:      General: Abdomen is flat. There is no distension. Musculoskeletal:         General: No swelling, tenderness, deformity or signs of injury. Right lower leg: No edema. Left lower leg: No edema. Skin:     General: Skin is warm and dry. Capillary Refill: Capillary refill takes less than 2 seconds. Findings: No bruising or erythema.    Neurological:      Mental Status: He is alert and oriented to person, place, and time. Cranial Nerves: No cranial nerve deficit. Sensory: Sensory deficit present. Motor: Weakness present. NEUROVASCULAR: There is severe first webspace wasting and intrinsic atrophy. Patient is unable to abduct or adduct his digits. Examination L R Examination L R   Carpal Comp. - - Pronator Comp. - -   Carpal Tinel - - Pronator Tinel - -   Phalen's - - Pronator Stress - -   Cubital Comp. + + Guyon Comp. - -   Cubital Tinel + + Guyon Tinel - -   Elbow Hyperflexion + + Adson's - -   Spurling's - - SC Comp. - -   PCB Median abn - - SC Tinel - -   Radial Tinel - - IC Comp. - -   Digital Tinel - - IC Tinel - -   Radial 2-Pt WNL WNL Ulnar 2-Pt WNL WNL     Radial Pulse: 2+  Capillary Refill: < 2 sec  Иван: Not Performed  Digital Иван: Not Performed        Imaging:     None indicated        ICD-10-CM ICD-9-CM    1. Cubital tunnel syndrome, bilateral  G56.23 354.2 NCV/LAT MOTOR PER NERVE UP/RT      NCV/LAT MOTOR PER NERVE UP/LT      EMG TWO EXTREMITIES UPPER         Plan:     Bilateral upper extremity EMG and nerve conduction study. I discussed with the patient he would likely need surgical release of his ulnar nerve at the elbows however he is unlikely to gain his strength back in his hand. Follow-up and Dispositions    · Return for EMG Review.           Plan was reviewed with patient, who verbalized agreement and understanding of the plan

## 2021-01-20 DIAGNOSIS — G56.23 CUBITAL TUNNEL SYNDROME, BILATERAL: ICD-10-CM

## 2021-02-03 ENCOUNTER — OFFICE VISIT (OUTPATIENT)
Dept: ORTHOPEDIC SURGERY | Age: 56
End: 2021-02-03
Payer: COMMERCIAL

## 2021-02-03 VITALS
HEIGHT: 71 IN | SYSTOLIC BLOOD PRESSURE: 158 MMHG | TEMPERATURE: 97.7 F | DIASTOLIC BLOOD PRESSURE: 83 MMHG | OXYGEN SATURATION: 95 % | BODY MASS INDEX: 27.86 KG/M2 | WEIGHT: 199 LBS | HEART RATE: 80 BPM

## 2021-02-03 DIAGNOSIS — R94.131 ABNORMAL EMG: ICD-10-CM

## 2021-02-03 DIAGNOSIS — R20.0 NUMBNESS AND TINGLING IN BOTH HANDS: Primary | ICD-10-CM

## 2021-02-03 DIAGNOSIS — R20.2 NUMBNESS AND TINGLING IN BOTH HANDS: Primary | ICD-10-CM

## 2021-02-03 PROCEDURE — 95911 NRV CNDJ TEST 9-10 STUDIES: CPT | Performed by: PHYSICAL MEDICINE & REHABILITATION

## 2021-02-03 PROCEDURE — 95886 MUSC TEST DONE W/N TEST COMP: CPT | Performed by: PHYSICAL MEDICINE & REHABILITATION

## 2021-02-03 NOTE — PATIENT INSTRUCTIONS
High Blood Pressure: Care Instructions Overview It's normal for blood pressure to go up and down throughout the day. But if it stays up, you have high blood pressure. Another name for high blood pressure is hypertension. Despite what a lot of people think, high blood pressure usually doesn't cause headaches or make you feel dizzy or lightheaded. It usually has no symptoms. But it does increase your risk of stroke, heart attack, and other problems. You and your doctor will talk about your risks of these problems based on your blood pressure. Your doctor will give you a goal for your blood pressure. Your goal will be based on your health and your age. Lifestyle changes, such as eating healthy and being active, are always important to help lower blood pressure. You might also take medicine to reach your blood pressure goal. 
Follow-up care is a key part of your treatment and safety. Be sure to make and go to all appointments, and call your doctor if you are having problems. It's also a good idea to know your test results and keep a list of the medicines you take. How can you care for yourself at home? Medical treatment · If you stop taking your medicine, your blood pressure will go back up. You may take one or more types of medicine to lower your blood pressure. Be safe with medicines. Take your medicine exactly as prescribed. Call your doctor if you think you are having a problem with your medicine. · Talk to your doctor before you start taking aspirin every day. Aspirin can help certain people lower their risk of a heart attack or stroke. But taking aspirin isn't right for everyone, because it can cause serious bleeding. · See your doctor regularly. You may need to see the doctor more often at first or until your blood pressure comes down. · If you are taking blood pressure medicine, talk to your doctor before you take decongestants or anti-inflammatory medicine, such as ibuprofen. Some of these medicines can raise blood pressure. · Learn how to check your blood pressure at home. Lifestyle changes · Stay at a healthy weight. This is especially important if you put on weight around the waist. Losing even 10 pounds can help you lower your blood pressure. · If your doctor recommends it, get more exercise. Walking is a good choice. Bit by bit, increase the amount you walk every day. Try for at least 30 minutes on most days of the week. You also may want to swim, bike, or do other activities. · Avoid or limit alcohol. Talk to your doctor about whether you can drink any alcohol. · Try to limit how much sodium you eat to less than 2,300 milligrams (mg) a day. Your doctor may ask you to try to eat less than 1,500 mg a day. · Eat plenty of fruits (such as bananas and oranges), vegetables, legumes, whole grains, and low-fat dairy products. · Lower the amount of saturated fat in your diet. Saturated fat is found in animal products such as milk, cheese, and meat. Limiting these foods may help you lose weight and also lower your risk for heart disease. · Do not smoke. Smoking increases your risk for heart attack and stroke. If you need help quitting, talk to your doctor about stop-smoking programs and medicines. These can increase your chances of quitting for good. When should you call for help? Call  911 anytime you think you may need emergency care. This may mean having symptoms that suggest that your blood pressure is causing a serious heart or blood vessel problem. Your blood pressure may be over 180/120. For example, call 911 if: 
  · You have symptoms of a heart attack. These may include: 
? Chest pain or pressure, or a strange feeling in the chest. 
? Sweating. ? Shortness of breath. ? Nausea or vomiting. ? Pain, pressure, or a strange feeling in the back, neck, jaw, or upper belly or in one or both shoulders or arms. ? Lightheadedness or sudden weakness. ? A fast or irregular heartbeat.  
  · You have symptoms of a stroke. These may include: 
? Sudden numbness, tingling, weakness, or loss of movement in your face, arm, or leg, especially on only one side of your body. ? Sudden vision changes. ? Sudden trouble speaking. ? Sudden confusion or trouble understanding simple statements. ? Sudden problems with walking or balance. ? A sudden, severe headache that is different from past headaches.  
  · You have severe back or belly pain. Do not wait until your blood pressure comes down on its own. Get help right away. Call your doctor now or seek immediate care if: 
  · Your blood pressure is much higher than normal (such as 180/120 or higher), but you don't have symptoms.  
  · You think high blood pressure is causing symptoms, such as: 
? Severe headache. 
? Blurry vision. Watch closely for changes in your health, and be sure to contact your doctor if: 
  · Your blood pressure measures higher than your doctor recommends at least 2 times. That means the top number is higher or the bottom number is higher, or both.  
  · You think you may be having side effects from your blood pressure medicine. Where can you learn more? Go to http://www.gray.com/ Enter U793 in the search box to learn more about \"High Blood Pressure: Care Instructions. \" Current as of: December 16, 2019               Content Version: 12.6 © 3824-5606 [employer]. Care instructions adapted under license by [employer] (which disclaims liability or warranty for this information). If you have questions about a medical condition or this instruction, always ask your healthcare professional. [address] any warranty or liability for your use of this information.

## 2021-02-03 NOTE — PROGRESS NOTES
[address], [address]  [address]  Phone: [phone]  Fax: [phone]        Rosey Boas  : 1965  PCP: Marcus Cooper MD  2/3/2021    ELECTROMYOGRAPHY AND NERVE CONDUCTION STUDIES    Raymond Arora was referred by Dr. Pablo Palacio for electrodiagnostic evaluation of bilateral hand numbness and tingling. NCV & EMG Findings:  Evaluation of the left median motor nerve showed prolonged distal onset latency (4.4 ms). The right median motor nerve showed prolonged distal onset latency (5.3 ms) and decreased conduction velocity (Elbow-Wrist, 45 m/s). The left ulnar motor and the right ulnar motor nerves showed prolonged distal onset latency (L5.5, R4.3 ms), reduced amplitude (L0.9, R0.4 mV), decreased conduction velocity (B Elbow-Wrist, L31, R34 m/s), and decreased conduction velocity (A Elbow-B Elbow, L20, R30 m/s). The left median sensory and the right median sensory nerves showed prolonged distal peak latency (L3.8, R4.5 ms), reduced amplitude (L4.2, R1.9 µV), and decreased conduction velocity (Wrist-2nd Digit, L37, R31 m/s). The left ulnar sensory nerve showed no response (Wrist) and no response (B Elbow). The right ulnar sensory nerve showed no response (Wrist). All remaining nerves (as indicated in the following tables) were within normal limits. Left vs. Right side comparison data for the median motor nerve indicates abnormal L-R latency difference (0.9 ms). The ulnar motor nerve indicates abnormal L-R latency difference (1.2 ms) and abnormal L-R amplitude difference (55.6 %). The median sensory nerve indicates abnormal L-R latency difference (0.7 ms). Needle evaluation of the right first dorsal interosseous muscle showed increased insertional activity, increased spontaneous activity, diminished recruitment, and moderately decreased interference pattern.   The left first dorsal interosseous muscle showed increased insertional activity and moderately increased spontaneous activity. The left flexor carpi ulnaris muscle showed increased motor unit amplitude. All remaining muscles (as indicated in the following table) showed no evidence of electrical instability. INTERPRETATION  This is an abnormal electrodiagnostic examination. These findings may be consistent with:   1. Length dependent sensorimotor peripheral neuropathy preferentially affecting the ulnar nerve bilaterally. However, an entrapment neuropathy cannot be excluded - this is based on abnormalities throughout the NCS not attributable to entrapment. CLINICAL INTERPRETATION  His electrodiagnostic findings most consistent with severe peripheral neuropathy are consistent with his symptoms. HISTORY OF PRESENT ILLNESS  Kike Johnson is a 60 y.o. male. Pt presents today for BUE EMG evaluation of bilateral hand numbness and tingling into the little and ring fingers. Of note, Pt notes that he broke his neck many years ago and had a C4-5 subluxation. PmHx: DM    PAST MEDICAL HISTORY   Past Medical History:   Diagnosis Date    Abnormal nuclear cardiac imaging test 01/31/2012    Mod inferior infarction w/mild-mod simona-infarct ischemia. LVE. EF 43%. Marked basal inferior hypk; otherwise mild-mod inferior, inferolateral, distal anteroapical hypk. TID index 1.07. Pos max EST suggests subtotal obstruction of RCA.  Arthritis     Calculus of kidney     Chronic pain     Coronary artery disease     Inferior MI in 1998 status post RCA stent; ISR in 1999 status post rotational atherectomy. Posterior wall MI 2002 with circumflex stent.  Depression     Dyslipidemia     History of echocardiogram 10/09/2012    EF 50-55%. No WMA. Gr 1 DDfx. RVSP normal.  LAE.  IVCE.       Hyperlipidemia     Hypertension     mild    Long term current use of anticoagulant therapy     Neuropathy     Non-insulin dependent diabetes mellitus     Renal duplex 12/02/2013    No significant RA stenosis. Patent bilateral renal veins w/o thrombosis.  S/P attempted coronary angioplasty 10/08/2012    Unsuccessful angioplasty of chronic RCA occlusion w/collaterals.  S/P cardiac cath 02/17/2012    %. ISR. LM patent. LAD patent. pD1 55%. mCX patent. OM1 80% (3 x 30 Clarence stent o/lapped w/3 x 12 Clarence stent; residual 0%). LVEDP 13. EF 50-55%. Past Surgical History:   Procedure Laterality Date    COLONOSCOPY N/A 4/24/2019    COLON[facility] ENDOSCOPY    HX APPENDECTOMY      HX UROLOGICAL Right     kidney stone    HX UROLOGICAL Bilateral     orchiopexy for torsion    [place] CARDIAC SURG PROCEDURE UNLIST      stents x3   . MEDICATIONS    Current Outpatient Medications   Medication Sig Dispense Refill    rOPINIRole (REQUIP) 0.5 mg tablet Take 0.5 mg by mouth once over twenty-four (24) hours.  TRULICITY 5.03 EE/6.7 mL sub-q pen 0.5 ML BY SUBCUTANEOUS ROUTE EVERY SEVEN (7) DAYS. 6 Pen 3    Insulin Needles, Disposable, (SHADE PEN NEEDLE) 32 gauge x 5/32\" ndle 1 Units by Does Not Apply route two (2) times a day. 200 Pen Needle 4    sertraline (ZOLOFT) 50 mg tablet TAKE 1 TAB BY MOUTH DAILY. APPOINTMENT NEEDED TO ESTABLISH CARE 30 Tab 2    gabapentin (NEURONTIN) 800 mg tablet TAKE 1 TAB BY MOUTH 3 TIMES A DAY 90 Tab 0    hydroCHLOROthiazide (HYDRODIURIL) 25 mg tablet Take 1 Tab by mouth daily. Appointment needed to establish care  Indications: high blood pressure 30 Tab 2    flash glucose scanning reader (FREESTYLE BENSON 14 DAY READER) misc 1 Kit by Does Not Apply route three (3) times daily.  E11.9 4 Each 2    flash glucose sensor (FREESTYLE BENSON 14 DAY SENSOR) kit Take BG reading TID (E11.9) 5 Kit 1    insulin glargine (LANTUS,BASAGLAR) 100 unit/mL (3 mL) inpn Take 10 units nightly 5 Adjustable Dose Pre-filled Pen Syringe 3    metFORMIN (GLUCOPHAGE) 1,000 mg tablet Take 0.5 Tabs by mouth two (2) times daily (with meals). Indications: type 2 diabetes mellitus 180 Tab 1    atorvastatin (LIPITOR) 40 mg tablet Take 1 Tab by mouth daily. 90 Tab 3    clopidogrel (PLAVIX) 75 mg tab Take 1 Tab by mouth daily. 90 Tab 3    amLODIPine (NORVASC) 10 mg tablet TAKE 1 TABLET DAILY 90 Tab 1    ramipril (ALTACE) 10 mg capsule Take 1 Cap by mouth daily. Indications: high blood pressure 90 Cap 1    traZODone (DESYREL) 100 mg tablet TAKE 1 TAB BY MOUTH NIGHTLY. APPOINTMENT NEEDED TO ESTABLISH CARE 90 Tab 1    fenofibrate nanocrystallized (TRICOR) 145 mg tablet TAKE 1 TABLET DAILY 90 Tab 1    Blood-Glucose Meter monitoring kit Use once daily as directed. 1 Kit 0    glucose blood VI test strips (BLOOD GLUCOSE TEST) strip Use daily as directed - must match his glucometer. 100 Strip 3    Lancets misc Use daily as directed 100 Each 3    aspirin delayed-release 81 mg tablet Take 81 mg by mouth daily.  MULTIVITAMIN PO Take 1 tablet by mouth every morning.           ALLERGIES  No Known Allergies       SOCIAL HISTORY    Social History     Socioeconomic History    Marital status:      Spouse name: Not on file    Number of children: Not on file    Years of education: Not on file    Highest education level: Not on file   Tobacco Use    Smoking status: Former Smoker     Years: 20.00     Quit date: 2007     Years since quittin.0    Smokeless tobacco: Never Used   Substance and Sexual Activity    Alcohol use: No    Drug use: No    Sexual activity: Not Currently       FAMILY HISTORY  Family History   Problem Relation Age of Onset    Heart Disease Mother     Diabetes Father     Heart Disease Maternal Grandfather          PHYSICAL EXAMINATION  Visit Vitals  BP (!) 158/83 (BP 1 Location: Right upper arm, BP Patient Position: Sitting) Comment: pt asymptomatic, MD aware   Pulse 80   Temp 97.7 °F (36.5 °C) (Skin)   Ht 5' 11\" (1.803 m)   Wt 199 lb (90.3 kg)   SpO2 95% Comment: RA   BMI 27.75 kg/m² Pain Assessment  2/3/2021   Location of Pain Hand   Location Modifiers Left;Right   Severity of Pain 0   Quality of Pain Other (Comment)   Quality of Pain Comment n/t and weakness to bilateral hands, stabbing pain at times   Duration of Pain -   Frequency of Pain -   Aggravating Factors Other (Comment)   Aggravating Factors Comment pain just comes and goes   Limiting Behavior Yes   Relieving Factors Other (Comment)   Relieving Factors Comment pt is not sure what helps   Result of Injury No           Constitutional:  Well developed, well nourished, in no acute distress. Psychiatric: Affect and mood are appropriate. Integumentary: No rashes or abrasions noted on exposed areas. SPINE/MUSCULOSKELETAL EXAM    On brief examination: Atrophy in ulnar nerve distribution bilaterally.       NCV & EMG Findings:  Nerve Conduction Studies  Anti Sensory Summary Table     Stim Site NR Peak (ms) Norm Peak (ms) O-P Amp (µV) Norm O-P Amp Site1 Site2 Delta-P (ms) Dist (cm) Rainer (m/s) Norm Rainer (m/s)   Left Median Anti Sensory (2nd Digit)   Wrist    3.8 <3.6 4.2 >10 Wrist 2nd Digit 3.8 14.0 37 >39   Right Median Anti Sensory (2nd Digit)   Wrist    4.5 <3.6 1.9 >10 Wrist 2nd Digit 4.5 14.0 31 >39   Elbow    4.6  2.4  Elbow Wrist 0.1 0.0  >48   Left Radial Anti Sensory (Base 1st Digit)   Wrist    2.5 <3.1 18.6  Wrist Base 1st Digit 2.5 0.0     Right Radial Anti Sensory (Base 1st Digit)   Wrist    3.0 <3.1 6.6  Wrist Base 1st Digit 3.0 0.0     Site 2    3.0  7.3          Left Ulnar Anti Sensory (5th Digit)   Wrist NR  <3.7  >15.0 Wrist 5th Digit  14.0  >38   B Elbow NR     B Elbow Wrist  0.0  >47   Right Ulnar Anti Sensory (5th Digit)   Wrist NR  <3.7  >15.0 Wrist 5th Digit  14.0  >38     Motor Summary Table     Stim Site NR Onset (ms) Norm Onset (ms) O-P Amp (mV) Norm O-P Amp Site1 Site2 Delta-0 (ms) Dist (cm) Rainer (m/s) Norm Rainer (m/s)   Left Median Motor (Abd Poll Brev)   Wrist    4.4 <4.2 6.9 >5 Elbow Wrist 4.7 25.0 53 >50 Elbow    9.1  5.8          Right Median Motor (Abd Poll Brev)   Wrist    5.3 <4.2 5.0 >5 Elbow Wrist 5.5 24.5 45 >50   Elbow    10.8  4.6          Left Ulnar Motor (Abd Dig Min)   Wrist    5.5 <4.2 0.9 >3 B Elbow Wrist 6.5 20.0 31 >53   B Elbow    12.0  0.7  A Elbow B Elbow 5.1 10.0 20 >53   A Elbow    17.1  0.4          Right Ulnar Motor (Abd Dig Min)   Wrist    4.3 <4.2 0.4 >3 B Elbow Wrist 5.9 20.0 34 >53   B Elbow    10.2  0.2  A Elbow B Elbow 3.3 10.0 30 >53   A Elbow    13.5  0.2            EMG     Side Muscle Nerve Root Ins Act Fibs Psw Amp Dur Poly Recrt Int Lucyann Levels Comment   Right Biceps Musculocut C5-6 Nml Nml Nml Nml Nml 0 Nml Nml    Right Triceps Radial C6-7-8 Nml Nml Nml Nml Nml 0 Nml Nml    Right PronatorTeres Median C6-7 Nml Nml Nml Nml Nml 0 Nml Nml    Right Abd Poll Brev Median C8-T1 Nml Nml Nml Nml Nml 0 Nml Nml    Right 1stDorInt Ulnar C8-T1 Incr 1+ 3+ Nml Nml 0 Reduced 75%    Left Biceps Musculocut C5-6 Nml Nml Nml Nml Nml 0 Nml Nml    Left Triceps Radial C6-7-8 Nml Nml Nml Nml Nml 0 Nml Nml    Left PronatorTeres Median C6-7 Nml Nml Nml Nml Nml 0 Nml Nml    Left Abd Poll Brev Median C8-T1 Nml Nml Nml Nml Nml 0 Nml Nml    Left 1stDorInt Ulnar C8-T1 Incr 1+ 2+ Nml Nml 0 Nml Nml    Left FlexCarpiUln Ulnar C8,T1 Nml Nml Nml Incr Nml 0 Nml Nml    Right FlexCarpiUln Ulnar C8,T1 Nml Nml Nml Nml Nml 0 Nml Nml        Nerve Conduction Studies  Anti Sensory Left/Right Comparison     Stim Site L Lat (ms) R Lat (ms) L-R Lat (ms) L Amp (µV) R Amp (µV) L-R Amp (%) Site1 Site2 L Rainer (m/s) R Rainer (m/s) L-R Rainer (m/s)   Median Anti Sensory (2nd Digit)   Wrist 3.8 4.5 0.7 4.2 1.9 54.8 Wrist 2nd Digit 37 31 6   Radial Anti Sensory (Base 1st Digit)   Wrist 2.5 3.0 0.5 18.6 6.6 64.5 Wrist Base 1st Digit      Ulnar Anti Sensory (5th Digit)   Wrist       Wrist 5th Digit      B Elbow       B Elbow Wrist        Motor Left/Right Comparison     Stim Site L Lat (ms) R Lat (ms) L-R Lat (ms) L Amp (mV) R Amp (mV) L-R Amp (%) Site1 Site2 L Rainer (m/s) R Rainer (m/s) L-R Rainer (m/s)   Median Motor (Abd Poll Brev)   Wrist 4.4 5.3 0.9 6.9 5.0 27.5 Elbow Wrist 53 45 8   Elbow 9.1 10.8 1.7 5.8 4.6 20.7        Ulnar Motor (Abd Dig Min)   Wrist 5.5 4.3 1.2 0.9 0.4 55.6 B Elbow Wrist 31 34 3   B Elbow 12.0 10.2 1.8 0.7 0.2 71.4 A Elbow B Elbow 20 30 10   A Elbow 17.1 13.5 3.6 0.4 0.2 50.0              Waveforms:                                  [facility]  OFFICE PROCEDURE PROGRESS NOTE        Chart reviewed for the following:   Vikram ARANGO, have reviewed the History, Physical and updated the Allergic reactions for [place] Greta Lozano performed immediately prior to start of procedure:   Vikram ARANGO, have performed the following reviews on Juan Manuel James prior to the start of the procedure:            * Patient was identified by name and date of birth   * Agreement on procedure being performed was verified  * Risks and Benefits explained to the patient  * Procedure site verified and marked as necessary  * Patient was positioned for comfort  * Consent was signed and verified     Time: 12:21 PM    Date of procedure: 2/3/2021    Procedure performed by:  Makayla Hogan MD    Provider accompanied by: Fina. Patient accompanied by: Self.     How tolerated by patient: tolerated the procedure well with no complications    Post Procedural Pain Scale: 0 - No Hurt    Comments: none    Written by Elba Stover, [address] [facility] [address] as dictated by Vikram Russell MD

## 2021-02-03 NOTE — PROGRESS NOTES
Deb Mckeon presents today for   Chief Complaint   Patient presents with    Hand Pain     bilateral       Is someone accompanying this pt? no    Is the patient using any DME equipment during OV? no    Depression Screening:  3 most recent PHQ Screens 12/29/2017   Little interest or pleasure in doing things Nearly every day   Feeling down, depressed, irritable, or hopeless Nearly every day   Total Score PHQ 2 6   Trouble falling or staying asleep, or sleeping too much Nearly every day   Feeling tired or having little energy Nearly every day   Poor appetite, weight loss, or overeating Not at all   Feeling bad about yourself - or that you are a failure or have let yourself or your family down Nearly every day   Trouble concentrating on things such as school, work, reading, or watching TV Nearly every day   Moving or speaking so slowly that other people could have noticed; or the opposite being so fidgety that others notice Not at all   Thoughts of being better off dead, or hurting yourself in some way Nearly every day   PHQ 9 Score 21   How difficult have these problems made it for you to do your work, take care of your home and get along with others Somewhat difficult       Learning Assessment:  Learning Assessment 8/1/2017   PRIMARY LEARNER Patient   HIGHEST LEVEL OF EDUCATION - PRIMARY LEARNER  -   CO-LEARNER CAREGIVER -   PRIMARY LANGUAGE ENGLISH   LEARNER PREFERENCE PRIMARY DEMONSTRATION   ANSWERED BY Patient   RELATIONSHIP SELF       Abuse Screening:  Abuse Screening Questionnaire 11/26/2018   Do you ever feel afraid of your partner? N   Are you in a relationship with someone who physically or mentally threatens you? N   Is it safe for you to go home? Y         Coordination of Care:  1. Have you been to the ER, urgent care clinic since your last visit? no  Hospitalized since your last visit? no    2.  Have you seen or consulted any other health care providers outside of the [address] [place] [address] since your last visit? Yes, orthopedics Include any pap smears or colon screening.  no

## 2021-02-10 ENCOUNTER — OFFICE VISIT (OUTPATIENT)
Dept: ORTHOPEDIC SURGERY | Age: 56
End: 2021-02-10
Payer: COMMERCIAL

## 2021-02-10 VITALS
HEIGHT: 71 IN | RESPIRATION RATE: 16 BRPM | WEIGHT: 198.2 LBS | DIASTOLIC BLOOD PRESSURE: 83 MMHG | TEMPERATURE: 96.8 F | OXYGEN SATURATION: 98 % | HEART RATE: 75 BPM | BODY MASS INDEX: 27.75 KG/M2 | SYSTOLIC BLOOD PRESSURE: 125 MMHG

## 2021-02-10 DIAGNOSIS — G56.23 CUBITAL TUNNEL SYNDROME, BILATERAL: ICD-10-CM

## 2021-02-10 DIAGNOSIS — E11.42 DIABETIC POLYNEUROPATHY ASSOCIATED WITH TYPE 2 DIABETES MELLITUS (HCC): Primary | ICD-10-CM

## 2021-02-10 PROCEDURE — 99214 OFFICE O/P EST MOD 30 MIN: CPT | Performed by: ORTHOPAEDIC SURGERY

## 2021-02-10 NOTE — PROGRESS NOTES
Laci Burnett is a 55 y.o. male right handed unspecified employment.  Worker's Compensation and legal considerations: none filed.    Vitals:    02/10/21 1548   BP: 125/83   Pulse: 75   Resp: 16   Temp: 96.8 °F (36 °C)   TempSrc: Temporal   SpO2: 98%   Weight: 198 lb 3.2 oz (89.9 kg)   Height: 5' 11\" (1.803 m)   PainSc:   0 - No pain   PainLoc: Hand           Chief Complaint   Patient presents with   • Hand Pain     leyda hand pain       HPI: Patient presents today for follow-up of bilateral upper extremity EMGs.  He reports no change since his last visit.  He reports a recent hemoglobin A1c of 8.3.    Initial HPI: Patient comes in today with complaints of bilateral hand weakness and numbness and tingling in the little finger and ring finger.    Date of onset: July 2020    Injury: No    Prior Treatment:  No    Numbness/ Tingling: Yes: Comment: Bilateral ring and little finger      ROS: Review of Systems - General ROS: negative  Psychological ROS: negative  ENT ROS: negative  Allergy and Immunology ROS: negative  Hematological and Lymphatic ROS: negative  Respiratory ROS: no cough, shortness of breath, or wheezing  Cardiovascular ROS: no chest pain or dyspnea on exertion  Gastrointestinal ROS: no abdominal pain, change in bowel habits, or black or bloody stools  Musculoskeletal ROS: positive for - pain in hand - bilateral  Neurological ROS: positive for - numbness/tingling and weakness  Dermatological ROS: negative    Past Medical History:   Diagnosis Date   • Abnormal nuclear cardiac imaging test 01/31/2012    Mod inferior infarction w/mild-mod simona-infarct ischemia.  LVE.  EF 43%.  Marked basal inferior hypk; otherwise mild-mod inferior, inferolateral, distal anteroapical hypk.  TID index 1.07.  Pos max EST suggests subtotal obstruction of RCA.     • Arthritis    • Calculus of kidney    • Chronic pain    • Coronary artery disease     Inferior MI in 1998 status post RCA stent; ISR in 1999 status post rotational  atherectomy. Posterior wall MI 2002 with circumflex stent.  Depression     Dyslipidemia     History of echocardiogram 10/09/2012    EF 50-55%. No WMA. Gr 1 DDfx. RVSP normal.  LAE.  IVCE.  Hyperlipidemia     Hypertension     mild    Long term current use of anticoagulant therapy     Neuropathy     Non-insulin dependent diabetes mellitus     Renal duplex 12/02/2013    No significant RA stenosis. Patent bilateral renal veins w/o thrombosis.  S/P attempted coronary angioplasty 10/08/2012    Unsuccessful angioplasty of chronic RCA occlusion w/collaterals.  S/P cardiac cath 02/17/2012    %. ISR. LM patent. LAD patent. pD1 55%. mCX patent. OM1 80% (3 x 30 [place] stent o/lapped w/3 x 12 [place] stent; residual 0%). LVEDP 13. EF 50-55%. Past Surgical History:   Procedure Laterality Date    COLONOSCOPY N/A 4/24/2019    COLONOSCOPY performed by Sudha Garcia MD at [facility] ENDOSCOPY    HX APPENDECTOMY      HX UROLOGICAL Right     kidney stone    HX UROLOGICAL Bilateral     orchiopexy for torsion    ME CARDIAC SURG PROCEDURE UNLIST      stents x3       Current Outpatient Medications   Medication Sig Dispense Refill    rOPINIRole (REQUIP) 0.5 mg tablet Take 0.5 mg by mouth once over twenty-four (24) hours.  TRULICITY 3.04 FX/5.8 mL sub-q pen 0.5 ML BY SUBCUTANEOUS ROUTE EVERY SEVEN (7) DAYS. 6 Pen 3    Insulin Needles, Disposable, (SHADE PEN NEEDLE) 32 gauge x 5/32\" ndle 1 Units by Does Not Apply route two (2) times a day. 200 Pen Needle 4    sertraline (ZOLOFT) 50 mg tablet TAKE 1 TAB BY MOUTH DAILY. APPOINTMENT NEEDED TO ESTABLISH CARE 30 Tab 2    gabapentin (NEURONTIN) 800 mg tablet TAKE 1 TAB BY MOUTH 3 TIMES A DAY 90 Tab 0    hydroCHLOROthiazide (HYDRODIURIL) 25 mg tablet Take 1 Tab by mouth daily.  Appointment needed to establish care  Indications: high blood pressure 30 Tab 2    flash glucose scanning reader (FREESTYLE BENSON 14 DAY READER) misc 1 Kit by Does Not Apply route three (3) times daily. E11.9 4 Each 2    flash glucose sensor (FREESTYLE BENSON 14 DAY SENSOR) kit Take BG reading TID (E11.9) 5 Kit 1    insulin glargine (LANTUS,BASAGLAR) 100 unit/mL (3 mL) inpn Take 10 units nightly 5 Adjustable Dose Pre-filled Pen Syringe 3    metFORMIN (GLUCOPHAGE) 1,000 mg tablet Take 0.5 Tabs by mouth two (2) times daily (with meals). Indications: type 2 diabetes mellitus 180 Tab 1    atorvastatin (LIPITOR) 40 mg tablet Take 1 Tab by mouth daily. 90 Tab 3    clopidogrel (PLAVIX) 75 mg tab Take 1 Tab by mouth daily. 90 Tab 3    amLODIPine (NORVASC) 10 mg tablet TAKE 1 TABLET DAILY 90 Tab 1    ramipril (ALTACE) 10 mg capsule Take 1 Cap by mouth daily. Indications: high blood pressure 90 Cap 1    traZODone (DESYREL) 100 mg tablet TAKE 1 TAB BY MOUTH NIGHTLY. APPOINTMENT NEEDED TO ESTABLISH CARE 90 Tab 1    fenofibrate nanocrystallized (TRICOR) 145 mg tablet TAKE 1 TABLET DAILY 90 Tab 1    Blood-Glucose Meter monitoring kit Use once daily as directed. 1 Kit 0    glucose blood VI test strips (BLOOD GLUCOSE TEST) strip Use daily as directed - must match his glucometer. 100 Strip 3    Lancets misc Use daily as directed 100 Each 3    aspirin delayed-release 81 mg tablet Take 81 mg by mouth daily.  MULTIVITAMIN PO Take 1 tablet by mouth every morning. No Known Allergies        PE:     Physical Exam  Vitals signs and nursing note reviewed. Constitutional:       General: He is not in acute distress. Appearance: Normal appearance. He is not ill-appearing. Eyes:      Extraocular Movements: Extraocular movements intact. Pupils: Pupils are equal, round, and reactive to light. Neck:      Musculoskeletal: Normal range of motion and neck supple. Abdominal:      General: Abdomen is flat. There is no distension. Musculoskeletal:         General: No swelling, tenderness, deformity or signs of injury. Right lower leg: No edema.       Left lower leg: No edema. Skin:     General: Skin is warm and dry. Capillary Refill: Capillary refill takes less than 2 seconds. Findings: No bruising or erythema. Neurological:      Mental Status: He is alert and oriented to person, place, and time. Cranial Nerves: No cranial nerve deficit. Sensory: Sensory deficit present. Motor: Weakness present. NEUROVASCULAR: There is severe first webspace wasting and intrinsic atrophy. Patient is unable to abduct or adduct his digits. Examination L R Examination L R   Carpal Comp. - - Pronator Comp. - -   Carpal Tinel - - Pronator Tinel - -   Phalen's - - Pronator Stress - -   Cubital Comp. + + Guyon Comp. - -   Cubital Tinel + + Guyon Tinel - -   Elbow Hyperflexion + + Adson's - -   Spurling's - - SC Comp. - -   PCB Median abn - - SC Tinel - -   Radial Tinel - - IC Comp. - -   Digital Tinel - - IC Tinel - -   Radial 2-Pt WNL WNL Ulnar 2-Pt WNL WNL     Radial Pulse: 2+  Capillary Refill: < 2 sec  Иван: Not Performed  [employer]: Not Performed      NCV & EMG Findings:  Evaluation of the left median motor nerve showed prolonged distal onset latency (4.4 ms). The right median motor nerve showed prolonged distal onset latency (5.3 ms) and decreased conduction velocity (Elbow-Wrist, 45 m/s). The left ulnar motor and the right ulnar motor nerves showed prolonged distal onset latency (L5.5, R4.3 ms), reduced amplitude (L0.9, R0.4 mV), decreased conduction velocity (B Elbow-Wrist, L31, R34 m/s), and decreased conduction velocity (A Elbow-B Elbow, L20, R30 m/s). The left median sensory and the right median sensory nerves showed prolonged distal peak latency (L3.8, R4.5 ms), reduced amplitude (L4.2, R1.9 µV), and decreased conduction velocity (Wrist-2nd Digit, L37, R31 m/s). The left ulnar sensory nerve showed no response (Wrist) and no response (B Elbow). The right ulnar sensory nerve showed no response (Wrist).   All remaining nerves (as indicated in the following tables) were within normal limits. Left vs. Right side comparison data for the median motor nerve indicates abnormal L-R latency difference (0.9 ms). The ulnar motor nerve indicates abnormal L-R latency difference (1.2 ms) and abnormal L-R amplitude difference (55.6 %). The median sensory nerve indicates abnormal L-R latency difference (0.7 ms).       Needle evaluation of the right first dorsal interosseous muscle showed increased insertional activity, increased spontaneous activity, diminished recruitment, and moderately decreased interference pattern. The left first dorsal interosseous muscle showed increased insertional activity and moderately increased spontaneous activity. The left flexor carpi ulnaris muscle showed increased motor unit amplitude. All remaining muscles (as indicated in the following table) showed no evidence of electrical instability.       INTERPRETATION  This is an abnormal electrodiagnostic examination. These findings may be consistent with:   1. Length dependent sensorimotor peripheral neuropathy preferentially affecting the ulnar nerve bilaterally. However, an entrapment neuropathy cannot be excluded - this is based on abnormalities throughout the NCS not attributable to entrapment.       CLINICAL INTERPRETATION  His electrodiagnostic findings most consistent with severe peripheral neuropathy are consistent with his symptoms. Imaging:     None indicated        ICD-10-CM ICD-9-CM    1. Diabetic polyneuropathy associated with type 2 diabetes mellitus (HCC)  E11.42 250.60      357.2    2. Cubital tunnel syndrome, bilateral  G56.23 354.2          Plan:     Again discussed with the patient that we could try cubital tunnel releases but given the fact that he most likely has peripheral neuropathy and not quite sure if he has a mononeuropathy he may not get complete release.   However given the fact that he has had a high hemoglobin A1c will have to wait until this is under better control. Follow-up and Dispositions    · Return in about 3 months (around 5/10/2021) for A1C follow-up.           Plan was reviewed with patient, who verbalized agreement and understanding of the plan

## 2021-02-16 ENCOUNTER — OFFICE VISIT (OUTPATIENT)
Dept: CARDIOLOGY CLINIC | Age: 56
End: 2021-02-16
Payer: COMMERCIAL

## 2021-02-16 VITALS
OXYGEN SATURATION: 97 % | WEIGHT: 202.8 LBS | HEART RATE: 66 BPM | SYSTOLIC BLOOD PRESSURE: 132 MMHG | BODY MASS INDEX: 28.39 KG/M2 | DIASTOLIC BLOOD PRESSURE: 76 MMHG | HEIGHT: 71 IN

## 2021-02-16 DIAGNOSIS — E78.5 HYPERLIPIDEMIA, UNSPECIFIED HYPERLIPIDEMIA TYPE: ICD-10-CM

## 2021-02-16 DIAGNOSIS — I44.1 SECOND DEGREE AV BLOCK: ICD-10-CM

## 2021-02-16 DIAGNOSIS — Z79.4 TYPE 2 DIABETES MELLITUS WITH DIABETIC NEUROPATHY, WITH LONG-TERM CURRENT USE OF INSULIN (HCC): ICD-10-CM

## 2021-02-16 DIAGNOSIS — E11.40 TYPE 2 DIABETES MELLITUS WITH DIABETIC NEUROPATHY, WITH LONG-TERM CURRENT USE OF INSULIN (HCC): ICD-10-CM

## 2021-02-16 DIAGNOSIS — I44.1 SECOND DEGREE AV BLOCK: Primary | ICD-10-CM

## 2021-02-16 DIAGNOSIS — I25.10 CORONARY ARTERY DISEASE INVOLVING NATIVE CORONARY ARTERY OF NATIVE HEART WITHOUT ANGINA PECTORIS: ICD-10-CM

## 2021-02-16 DIAGNOSIS — I10 ESSENTIAL HYPERTENSION WITH GOAL BLOOD PRESSURE LESS THAN 140/90: ICD-10-CM

## 2021-02-16 PROCEDURE — 99214 OFFICE O/P EST MOD 30 MIN: CPT | Performed by: INTERNAL MEDICINE

## 2021-02-16 PROCEDURE — 93000 ELECTROCARDIOGRAM COMPLETE: CPT | Performed by: INTERNAL MEDICINE

## 2021-02-16 NOTE — PROGRESS NOTES
HISTORY OF PRESENT ILLNESS  Raghu Moise is a 60 y.o. male. HPI    Patient presents for an overdue follow-up office visit. He is usually followed by Dr. Franklyn Luciano. He has a past medical history significant for single-vessel coronary artery disease with an occluded right coronary artery initially diagnosed in 1998 which was opened initially in 1999 with rotational atherectomy but then restenosed again to 100%  which was unsuccessfully intervened upon in 2012. He has been managed medically since that time. Patient also has conduction disease manifest initially with a prolonged first-degree AV block which then developed into a second-degree Mobitz type I AV block, first noted several years ago. The patient was last seen in our office approximately 16 months ago. The patient did have a syncopal episode in May 2020 where he states he was sitting at his desk at home working on the computer where he felt acutely ill, nauseated and then passed out. Apparently his family found him snoring and difficult to respond. Paramedics were called and he was taken to the emergency room with an apparent negative work-up. His EKG demonstrated a prolonged first-degree AV block. He then wore a Holter monitor which showed primarily the prolonged first-degree AV block with frequent episodes of Mobitz type I second-degree AV block and occasional 2-1 AV block. He did have a single pause of 2.9 seconds but no reported symptoms. He returns to the office today at the request of his PCP after being seen there last week for a routine office visit. His heart rate was discovered to be low in the upper extremities. EKGs were faxed to our office. All of the EKGs did demonstrate a second-degree, Mobitz type I AV block without acute ST-T abnormalities. Since the patient's syncopal episode 9 months ago, he states he has been feeling well and back to baseline. He has had no recurrent syncope or near syncope.   No major change in his activity tolerance. No chest pain, shortness of breath, leg swelling, orthopnea or PND. Past Medical History:   Diagnosis Date    Abnormal nuclear cardiac imaging test 01/31/2012    Mod inferior infarction w/mild-mod simona-infarct ischemia. LVE. EF 43%. Marked basal inferior hypk; otherwise mild-mod inferior, inferolateral, distal anteroapical hypk. TID index 1.07. Pos max EST suggests subtotal obstruction of RCA.  Arthritis     Calculus of kidney     Chronic pain     Coronary artery disease     Inferior MI in 1998 status post RCA stent; ISR in 1999 status post rotational atherectomy. Posterior wall MI 2002 with circumflex stent.  Depression     Dyslipidemia     History of echocardiogram 10/09/2012    EF 50-55%. No WMA. Gr 1 DDfx. RVSP normal.  LAE.  IVCE.  Hyperlipidemia     Hypertension     mild    Long term current use of anticoagulant therapy     Neuropathy     Non-insulin dependent diabetes mellitus     Renal duplex 12/02/2013    No significant RA stenosis. Patent bilateral renal veins w/o thrombosis.  S/P attempted coronary angioplasty 10/08/2012    Unsuccessful angioplasty of chronic RCA occlusion w/collaterals.  S/P cardiac cath 02/17/2012    %. ISR. LM patent. LAD patent. pD1 55%. mCX patent. OM1 80% (3 x 30 [place] stent o/lapped w/3 x 12 [place] stent; residual 0%). LVEDP 13. EF 50-55%.  Second degree AV block, Mobitz type I      Current Outpatient Medications   Medication Sig Dispense Refill    rOPINIRole (REQUIP) 0.5 mg tablet Take 0.5 mg by mouth once over twenty-four (24) hours.  TRULICITY 1.41 CT/4.2 mL sub-q pen 0.5 ML BY SUBCUTANEOUS ROUTE EVERY SEVEN (7) DAYS. 6 Pen 3    Insulin Needles, Disposable, (SHADE PEN NEEDLE) 32 gauge x 5/32\" ndle 1 Units by Does Not Apply route two (2) times a day. 200 Pen Needle 4    sertraline (ZOLOFT) 50 mg tablet TAKE 1 TAB BY MOUTH DAILY.  APPOINTMENT NEEDED TO ESTABLISH CARE 30 Tab 2    gabapentin (NEURONTIN) 800 mg tablet TAKE 1 TAB BY MOUTH 3 TIMES A DAY 90 Tab 0    hydroCHLOROthiazide (HYDRODIURIL) 25 mg tablet Take 1 Tab by mouth daily. Appointment needed to establish care  Indications: high blood pressure 30 Tab 2    flash glucose scanning reader (FREESTYLE BENSON 14 DAY READER) misc 1 Kit by Does Not Apply route three (3) times daily. E11.9 4 Each 2    flash glucose sensor (FREESTYLE BENSON 14 DAY SENSOR) kit Take BG reading TID (E11.9) 5 Kit 1    insulin glargine (LANTUS,BASAGLAR) 100 unit/mL (3 mL) inpn Take 10 units nightly 5 Adjustable Dose Pre-filled Pen Syringe 3    metFORMIN (GLUCOPHAGE) 1,000 mg tablet Take 0.5 Tabs by mouth two (2) times daily (with meals). Indications: type 2 diabetes mellitus 180 Tab 1    atorvastatin (LIPITOR) 40 mg tablet Take 1 Tab by mouth daily. 90 Tab 3    clopidogrel (PLAVIX) 75 mg tab Take 1 Tab by mouth daily. 90 Tab 3    amLODIPine (NORVASC) 10 mg tablet TAKE 1 TABLET DAILY 90 Tab 1    ramipril (ALTACE) 10 mg capsule Take 1 Cap by mouth daily. Indications: high blood pressure 90 Cap 1    traZODone (DESYREL) 100 mg tablet TAKE 1 TAB BY MOUTH NIGHTLY. APPOINTMENT NEEDED TO ESTABLISH CARE 90 Tab 1    fenofibrate nanocrystallized (TRICOR) 145 mg tablet TAKE 1 TABLET DAILY 90 Tab 1    Blood-Glucose Meter monitoring kit Use once daily as directed. 1 Kit 0    glucose blood VI test strips (BLOOD GLUCOSE TEST) strip Use daily as directed - must match his glucometer. 100 Strip 3    Lancets misc Use daily as directed 100 Each 3    aspirin delayed-release 81 mg tablet Take 81 mg by mouth daily.  MULTIVITAMIN PO Take 1 tablet by mouth every morning.        No Known Allergies       Social History     Tobacco Use    Smoking status: Former Smoker     Years: 20.00     Quit date: 2007     Years since quittin.0    Smokeless tobacco: Never Used   Substance Use Topics    Alcohol use: No    Drug use: No     Family History   Problem Relation Age of Onset    Heart Disease Mother     Diabetes Father     Heart Disease Maternal Grandfather        Review of Systems   Constitutional: Negative for chills, fever and weight loss. HENT: Negative for nosebleeds. Eyes: Negative for blurred vision and double vision. Respiratory: Negative for cough, shortness of breath and wheezing. Cardiovascular: Negative for chest pain, palpitations, orthopnea, claudication, leg swelling and PND. Gastrointestinal: Negative for abdominal pain, heartburn, nausea and vomiting. Genitourinary: Negative for dysuria and hematuria. Musculoskeletal: Negative for falls and myalgias. Skin: Negative for rash. Neurological: Negative for dizziness, focal weakness and headaches. Endo/Heme/Allergies: Does not bruise/bleed easily. Psychiatric/Behavioral: Negative for substance abuse. Visit Vitals  /76 (BP 1 Location: Left upper arm, BP Patient Position: Sitting, BP Cuff Size: Adult)   Pulse 66   Ht 5' 11\" (1.803 m)   Wt 92 kg (202 lb 12.8 oz)   SpO2 97%   BMI 28.28 kg/m²       Physical Exam   Constitutional: He is oriented to person, place, and time. He appears well-developed and well-nourished. HENT:   Head: Normocephalic and atraumatic. Eyes: Conjunctivae are normal.   Neck: Neck supple. No JVD present. Carotid bruit is not present. Cardiovascular: Normal rate, S1 normal, S2 normal and normal pulses. A regularly irregular rhythm present. Exam reveals no gallop and no S3. No murmur heard. Pulmonary/Chest: Breath sounds normal. He has no wheezes. He has no rales. Abdominal: Soft. Bowel sounds are normal. There is no abdominal tenderness. Musculoskeletal:         General: No tenderness, deformity or edema. Neurological: He is alert and oriented to person, place, and time. Skin: Skin is warm and dry. Psychiatric: He has a normal mood and affect.  His behavior is normal. Thought content normal.     EKG: Second-degree AV block, Mobitz type I, normal axis, normal QTc interval, no ST or T wave changes concerning for ischemia. Compared to the previous EKG, no significant interval change. ASSESSMENT and PLAN  Encounter Diagnoses   Name Primary?  Second degree AV block Yes    Coronary artery disease involving native coronary artery of native heart without angina pectoris     Essential hypertension with goal blood pressure less than 140/90     Hyperlipidemia, unspecified hyperlipidemia type     Type 2 diabetes mellitus with diabetic neuropathy, with long-term current use of insulin (HCC)      Second-degree AV block, Mobitz type I. This is again seen on today's EKG in our office. This was also seen last week at his PCPs office, albeit at a lower heart rate. Patient reports a single syncopal episode about 9 months ago in May of last year for which he was seen in the emergency room and released. It is possible that he may have had an episode of high-grade AV block, but that was not documented on a follow-up Holter monitor. I did offer that a pacemaker could be implanted given his symptoms last year, however, the patient wishes to just monitor his arrhythmia for now given he has had no recurrent symptomatology. I have recommended a follow-up Holter monitor prior to his next office visit. If he does have any recurrent syncopal or near syncopal episodes, I would simply proceed with implantation of a pacemaker. I would avoid all rate slowing agents. Coronary artery disease. History of  of his RCA dating back to 2012. He has remained on medical therapy with an aspirin, clopidogrel, and a potent statin. No new symptoms concerning for angina. I would continue his current medical regimen. Essential hypertension. Patient blood pressure appears well controlled on his current medical regimen. Dyslipidemia. Patient has been treated with atorvastatin 40 mg daily. From a cardiac standpoint his LDL should be less than 70.     Diabetes mellitus, type II. Insulin-dependent. Historically has been suboptimally controlled. He reports he recent hemoglobin A1c of 8.3. Ideally his hemoglobin A1c should be less than 7. Follow-up in 6 months with Dr. Anne Basurto,, sooner if needed.

## 2021-02-16 NOTE — PROGRESS NOTES
Sharyn Maguire presents today for   Chief Complaint   Patient presents with   Tracey Farias Follow-up     Benjie Guzmán patient. Overdue follow up and abnormal EKG        Sharyn Maguire preferred language for health care discussion is english/other. Is someone accompanying this pt? no    Is the patient using any DME equipment during [address]? no    Depression Screening:  3 most recent PHQ Screens 2/16/2021   Little interest or pleasure in doing things Not at all   Feeling down, depressed, irritable, or hopeless Not at all   Total Score PHQ 2 0   Trouble falling or staying asleep, or sleeping too much -   Feeling tired or having little energy -   Poor appetite, weight loss, or overeating -   Feeling bad about yourself - or that you are a failure or have let yourself or your family down -   Trouble concentrating on things such as school, work, reading, or watching TV -   Moving or speaking so slowly that other people could have noticed; or the opposite being so fidgety that others notice -   Thoughts of being better off dead, or hurting yourself in some way -   PHQ 9 Score -   How difficult have these problems made it for you to do your work, take care of your home and get along with others -       Learning Assessment:  Learning Assessment 8/1/2017   PRIMARY LEARNER Patient   HIGHEST LEVEL OF EDUCATION - PRIMARY LEARNER  -   CO-LEARNER CAREGIVER -   PRIMARY LANGUAGE ENGLISH   LEARNER PREFERENCE PRIMARY DEMONSTRATION   ANSWERED BY Patient   RELATIONSHIP SELF       Abuse Screening:  Abuse Screening Questionnaire 2/16/2021   Do you ever feel afraid of your partner? N   Are you in a relationship with someone who physically or mentally threatens you? N   Is it safe for you to go home? Y       Fall Risk  Fall Risk Assessment, last 12 mths 2/16/2021   Able to walk? No       Pt currently taking Anticoagulant therapy? no    Coordination of Care:  1. Have you been to the ER, urgent care clinic since your last visit?  Hospitalized since your last visit? no    2. Have you seen or consulted any other health care providers outside of the [address] [place] [address] since your last visit? Include any pap smears or colon screening.  no    no

## 2021-03-29 ENCOUNTER — TELEPHONE (OUTPATIENT)
Dept: ORTHOPEDIC SURGERY | Age: 56
End: 2021-03-29

## 2021-03-29 NOTE — TELEPHONE ENCOUNTER
Please find out which side he wants done first and we will schedule him for 4/27. Let me know and I will put in the order for a cubital tunnel release.

## 2021-03-29 NOTE — TELEPHONE ENCOUNTER
Patient states at his last visit surgery was discussed and he was told he needed to get his A1C to 8 or below, which he now has at 7.6. He is eager to schedule surgery for carpal tunnel. Please create order and contact patient when available.       Patient [phone]

## 2021-03-31 NOTE — TELEPHONE ENCOUNTER
Patient called stating that he would like to proceed with surgery on his right hand first. He is requesting a call back at [phone]

## 2021-05-03 ENCOUNTER — DOCUMENTATION ONLY (OUTPATIENT)
Dept: ORTHOPEDIC SURGERY | Age: 56
End: 2021-05-03

## 2021-05-03 NOTE — PROGRESS NOTES
[employer], Disability Medical and FMLA Information Needed, was received via fax and placed in the forms bin at .

## 2021-05-10 ENCOUNTER — DOCUMENTATION ONLY (OUTPATIENT)
Dept: ORTHOPEDIC SURGERY | Age: 56
End: 2021-05-10

## 2021-05-10 ENCOUNTER — OFFICE VISIT (OUTPATIENT)
Dept: ORTHOPEDIC SURGERY | Age: 56
End: 2021-05-10
Payer: COMMERCIAL

## 2021-05-10 VITALS
HEART RATE: 53 BPM | WEIGHT: 203 LBS | BODY MASS INDEX: 28.42 KG/M2 | RESPIRATION RATE: 15 BRPM | OXYGEN SATURATION: 97 % | HEIGHT: 71 IN | TEMPERATURE: 97.2 F

## 2021-05-10 DIAGNOSIS — G56.21 CUBITAL TUNNEL SYNDROME, RIGHT: Primary | ICD-10-CM

## 2021-05-10 DIAGNOSIS — G56.23 CUBITAL TUNNEL SYNDROME, BILATERAL: ICD-10-CM

## 2021-05-10 DIAGNOSIS — Z01.818 PREOP EXAMINATION: Primary | ICD-10-CM

## 2021-05-10 PROCEDURE — 99214 OFFICE O/P EST MOD 30 MIN: CPT | Performed by: ORTHOPAEDIC SURGERY

## 2021-05-10 NOTE — PROGRESS NOTES
Pt dropped off disability forms at [facility]. Needs them completed asap as his surgery is scheduled for 05/18/21.

## 2021-05-11 DIAGNOSIS — Z01.818 PREOP EXAMINATION: Primary | ICD-10-CM

## 2021-05-11 NOTE — PROGRESS NOTES
Adrian Cadena is a 60 y.o. male right handed unspecified employment. Worker's Compensation and legal considerations: none filed. Vitals:    05/10/21 1623   Pulse: (!) 53   Resp: 15   Temp: 97.2 °F (36.2 °C)   SpO2: 97%   Weight: 203 lb (92.1 kg)   Height: 5' 11\" (1.803 m)   PainSc:   5   PainLoc: Arm           Chief Complaint   Patient presents with    Arm Pain     right       HPI: Patient presents today to schedule right cubital tunnel release preferably for next week. We have held a spot for him for this. 2/2021 HPI: Patient presents today for follow-up of bilateral upper extremity EMGs. He reports no change since his last visit. He reports a recent hemoglobin A1c of 8.3. Initial HPI: Patient comes in today with complaints of bilateral hand weakness and numbness and tingling in the little finger and ring finger. Date of onset: July 2020    Injury: No    Prior Treatment:  No    Numbness/ Tingling: Yes: Comment: Bilateral ring and little finger      ROS: Review of Systems - General ROS: negative  Psychological ROS: negative  ENT ROS: negative  Allergy and Immunology ROS: negative  Hematological and Lymphatic ROS: negative  Respiratory ROS: no cough, shortness of breath, or wheezing  Cardiovascular ROS: no chest pain or dyspnea on exertion  Gastrointestinal ROS: no abdominal pain, change in bowel habits, or black or bloody stools  Musculoskeletal ROS: positive for - pain in hand - bilateral  Neurological ROS: positive for - numbness/tingling and weakness  Dermatological ROS: negative    Past Medical History:   Diagnosis Date    Abnormal nuclear cardiac imaging test 01/31/2012    Mod inferior infarction w/mild-mod simona-infarct ischemia. LVE. EF 43%. Marked basal inferior hypk; otherwise mild-mod inferior, inferolateral, distal anteroapical hypk. TID index 1.07. Pos max EST suggests subtotal obstruction of RCA.       Arthritis     Calculus of kidney     Chronic pain     Coronary artery disease     Inferior MI in 1998 status post RCA stent; ISR in 1999 status post rotational atherectomy. Posterior wall MI 2002 with circumflex stent.  Depression     Dyslipidemia     History of echocardiogram 10/09/2012    EF 50-55%. No WMA. Gr 1 DDfx. RVSP normal.  LAE.  IVCE.  Hyperlipidemia     Hypertension     mild    Long term current use of anticoagulant therapy     Neuropathy     Non-insulin dependent diabetes mellitus     Renal duplex 12/02/2013    No significant RA stenosis. Patent bilateral renal veins w/o thrombosis.  S/P attempted coronary angioplasty 10/08/2012    Unsuccessful angioplasty of chronic RCA occlusion w/collaterals.  S/P cardiac cath 02/17/2012    %. ISR. LM patent. LAD patent. pD1 55%. mCX patent. OM1 80% (3 x 30 [place] stent o/lapped w/3 x 12 Topton stent; residual 0%). LVEDP 13. EF 50-55%.  Second degree AV block, Mobitz type I        Past Surgical History:   Procedure Laterality Date    COLONOSCOPY N/A 4/24/2019    COLONOSCOPY performed by Nelson Barkley [facility] at [facility] ENDOSCOPY    HX APPENDECTOMY      HX ORTHOPAEDIC  1984    Fx C4-5    HX UROLOGICAL Right     kidney stone    HX UROLOGICAL Bilateral     orchiopexy for torsion    [place] CARDIAC SURG PROCEDURE UNLIST      stents x3       Current Outpatient Medications   Medication Sig Dispense Refill    rOPINIRole (REQUIP) 0.5 mg tablet Take 0.5 mg by mouth once over twenty-four (24) hours.  TRULICITY 5.86 LR/7.2 mL sub-q pen 0.5 ML BY SUBCUTANEOUS ROUTE EVERY SEVEN (7) DAYS. 6 Pen 3    Insulin Needles, Disposable, (SHADE PEN NEEDLE) 32 gauge x 5/32\" ndle 1 Units by Does Not Apply route two (2) times a day. 200 Pen Needle 4    sertraline (ZOLOFT) 50 mg tablet TAKE 1 TAB BY MOUTH DAILY.  APPOINTMENT NEEDED TO ESTABLISH CARE 30 Tab 2    gabapentin (NEURONTIN) 800 mg tablet TAKE 1 TAB BY MOUTH 3 TIMES A DAY 90 Tab 0    hydroCHLOROthiazide (HYDRODIURIL) 25 mg tablet Take 1 Tab by mouth daily. Appointment needed to establish care  Indications: high blood pressure 30 Tab 2    flash glucose scanning reader (FREESTYLE BENSON 14 DAY READER) misc 1 Kit by Does Not Apply route three (3) times daily. E11.9 4 Each 2    flash glucose sensor (FREESTYLE BENSON 14 DAY SENSOR) kit Take BG reading TID (E11.9) 5 Kit 1    insulin glargine (LANTUS,BASAGLAR) 100 unit/mL (3 mL) inpn Take 10 units nightly 5 Adjustable Dose Pre-filled Pen Syringe 3    metFORMIN (GLUCOPHAGE) 1,000 mg tablet Take 0.5 Tabs by mouth two (2) times daily (with meals). Indications: type 2 diabetes mellitus 180 Tab 1    atorvastatin (LIPITOR) 40 mg tablet Take 1 Tab by mouth daily. 90 Tab 3    clopidogrel (PLAVIX) 75 mg tab Take 1 Tab by mouth daily. 90 Tab 3    amLODIPine (NORVASC) 10 mg tablet TAKE 1 TABLET DAILY 90 Tab 1    ramipril (ALTACE) 10 mg capsule Take 1 Cap by mouth daily. Indications: high blood pressure 90 Cap 1    traZODone (DESYREL) 100 mg tablet TAKE 1 TAB BY MOUTH NIGHTLY. APPOINTMENT NEEDED TO ESTABLISH CARE 90 Tab 1    fenofibrate nanocrystallized (TRICOR) 145 mg tablet TAKE 1 TABLET DAILY 90 Tab 1    Blood-Glucose Meter monitoring kit Use once daily as directed. 1 Kit 0    glucose blood VI test strips (BLOOD GLUCOSE TEST) strip Use daily as directed - must match his glucometer. 100 Strip 3    Lancets misc Use daily as directed 100 Each 3    aspirin delayed-release 81 mg tablet Take 81 mg by mouth daily.  MULTIVITAMIN PO Take 1 tablet by mouth every morning. No Known Allergies        PE:     Physical Exam  Vitals signs and nursing note reviewed. Constitutional:       General: He is not in acute distress. Appearance: Normal appearance. He is not ill-appearing, toxic-appearing or diaphoretic. HENT:      Head: Normocephalic and atraumatic. Nose: Nose normal.      Mouth/Throat:      Mouth: Mucous membranes are moist.   Eyes:      Extraocular Movements: Extraocular movements intact. Pupils: Pupils are equal, round, and reactive to light. Neck:      Musculoskeletal: Normal range of motion and neck supple. Cardiovascular:      Pulses: Normal pulses. Pulmonary:      Effort: Pulmonary effort is normal. No respiratory distress. Abdominal:      General: Abdomen is flat. There is no distension. Musculoskeletal:         General: No swelling, tenderness, deformity or signs of injury. Right lower leg: No edema. Left lower leg: No edema. Skin:     General: Skin is warm and dry. Capillary Refill: Capillary refill takes less than 2 seconds. Findings: No bruising or erythema. Neurological:      Mental Status: He is alert and oriented to person, place, and time. Cranial Nerves: No cranial nerve deficit. Sensory: Sensory deficit present. Motor: Weakness present. Psychiatric:         Mood and Affect: Mood normal.         Behavior: Behavior normal.            NEUROVASCULAR: There is severe first webspace wasting and intrinsic atrophy. Patient is unable to abduct or adduct his digits. Examination L R Examination L R   Carpal Comp. - - Pronator Comp. - -   Carpal Tinel - - Pronator Tinel - -   Phalen's - - Pronator Stress - -   Cubital Comp. + + Guyon Comp. - -   Cubital Tinel + + Guyon Tinel - -   Elbow Hyperflexion + + Adson's - -   Spurling's - - SC Comp. - -   PCB Median abn - - SC Tinel - -   Radial Tinel - - IC Comp. - -   Digital Tinel - - IC Tinel - -   Radial 2-Pt WNL WNL Ulnar 2-Pt WNL WNL     Radial Pulse: 2+  Capillary Refill: < 2 sec  Иван: Not Performed  [employer]: Not Performed      NCV & EMG Findings:  Evaluation of the left median motor nerve showed prolonged distal onset latency (4.4 ms). The right median motor nerve showed prolonged distal onset latency (5.3 ms) and decreased conduction velocity (Elbow-Wrist, 45 m/s).   The left ulnar motor and the right ulnar motor nerves showed prolonged distal onset latency (L5.5, R4.3 ms), reduced amplitude (L0.9, R0.4 mV), decreased conduction velocity (B Elbow-Wrist, L31, R34 m/s), and decreased conduction velocity (A Elbow-B Elbow, L20, R30 m/s). The left median sensory and the right median sensory nerves showed prolonged distal peak latency (L3.8, R4.5 ms), reduced amplitude (L4.2, R1.9 µV), and decreased conduction velocity (Wrist-2nd Digit, L37, R31 m/s). The left ulnar sensory nerve showed no response (Wrist) and no response (B Elbow). The right ulnar sensory nerve showed no response (Wrist). All remaining nerves (as indicated in the following tables) were within normal limits. Left vs. Right side comparison data for the median motor nerve indicates abnormal L-R latency difference (0.9 ms). The ulnar motor nerve indicates abnormal L-R latency difference (1.2 ms) and abnormal L-R amplitude difference (55.6 %). The median sensory nerve indicates abnormal L-R latency difference (0.7 ms).       Needle evaluation of the right first dorsal interosseous muscle showed increased insertional activity, increased spontaneous activity, diminished recruitment, and moderately decreased interference pattern. The left first dorsal interosseous muscle showed increased insertional activity and moderately increased spontaneous activity. The left flexor carpi ulnaris muscle showed increased motor unit amplitude. All remaining muscles (as indicated in the following table) showed no evidence of electrical instability.       INTERPRETATION  This is an abnormal electrodiagnostic examination. These findings may be consistent with:   1. Length dependent sensorimotor peripheral neuropathy preferentially affecting the ulnar nerve bilaterally. However, an entrapment neuropathy cannot be excluded - this is based on abnormalities throughout the NCS not attributable to entrapment.       CLINICAL INTERPRETATION  His electrodiagnostic findings most consistent with severe peripheral neuropathy are consistent with his symptoms. Imaging:     None indicated        ICD-10-CM ICD-9-CM    1. Cubital tunnel syndrome, right  G56.21 354.2    2. Cubital tunnel syndrome, bilateral  G56.23 354.2          Plan: We will proceed in schedule his right cubital tunnel release for next Tuesday. We will get all appropriate labs. Informed consent and postoperative convalescence were thoroughly discussed at this visit. This procedure has been fully reviewed with the patient and written informed consent has been obtained. The patient was counseled at length about the risks of arminda Covid-19 during their perioperative period and any recovery window from their procedure. The patient was made aware that arminda Covid-19  may worsen their prognosis for recovering from their procedure and lend to a higher morbidity and/or mortality risk. All material risks, benefits, and reasonable alternatives including postponing the procedure were discussed. The patient does  wish to proceed with the procedure at this time. Follow-up and Dispositions    · Return for 2 weeks postop.           Plan was reviewed with patient, who verbalized agreement and understanding of the plan

## 2021-05-13 ENCOUNTER — DOCUMENTATION ONLY (OUTPATIENT)
Dept: CARDIOLOGY CLINIC | Age: 56
End: 2021-05-13

## 2021-05-13 ENCOUNTER — HOSPITAL ENCOUNTER (OUTPATIENT)
Dept: PREADMISSION TESTING | Age: 56
Discharge: HOME | End: 2021-05-13
Payer: COMMERCIAL

## 2021-05-13 DIAGNOSIS — Z01.818 PREOP EXAMINATION: ICD-10-CM

## 2021-05-13 LAB
ALBUMIN SERPL-MCNC: 3.6 G/DL (ref 3.4–5)
ALBUMIN/GLOB SERPL: 1.4 {RATIO} (ref 0.8–1.7)
ALP SERPL-CCNC: 47 U/L (ref 45–117)
ALT SERPL-CCNC: 20 U/L (ref 16–61)
ANION GAP SERPL CALC-SCNC: 3 MMOL/L (ref 3–18)
AST SERPL-CCNC: 14 U/L (ref 10–38)
BASOPHILS # BLD: 0.1 K/UL (ref 0–0.1)
BASOPHILS NFR BLD: 1 % (ref 0–2)
BILIRUB SERPL-MCNC: 0.4 MG/DL (ref 0.2–1)
BUN SERPL-MCNC: 19 MG/DL (ref 7–18)
BUN/CREAT SERPL: 17 (ref 12–20)
CALCIUM SERPL-MCNC: 9 MG/DL (ref 8.5–10.1)
CHLORIDE SERPL-SCNC: 101 MMOL/L (ref 100–111)
CO2 SERPL-SCNC: 34 MMOL/L (ref 21–32)
CREAT SERPL-MCNC: 1.11 MG/DL (ref 0.6–1.3)
DIFFERENTIAL METHOD BLD: ABNORMAL
EOSINOPHIL # BLD: 0.3 K/UL (ref 0–0.4)
EOSINOPHIL NFR BLD: 7 % (ref 0–5)
ERYTHROCYTE [DISTWIDTH] IN BLOOD BY AUTOMATED COUNT: 12.1 % (ref 11.6–14.5)
EST. AVERAGE GLUCOSE BLD GHB EST-MCNC: 180 MG/DL
GLOBULIN SER CALC-MCNC: 2.5 G/DL (ref 2–4)
GLUCOSE SERPL-MCNC: 188 MG/DL (ref 74–99)
HBA1C MFR BLD: 7.9 % (ref 4.2–5.6)
HCT VFR BLD AUTO: 37.3 % (ref 36–48)
HGB BLD-MCNC: 13.2 G/DL (ref 13–16)
LYMPHOCYTES # BLD: 1.5 K/UL (ref 0.9–3.6)
LYMPHOCYTES NFR BLD: 30 % (ref 21–52)
MCH RBC QN AUTO: 30.8 PG (ref 24–34)
MCHC RBC AUTO-ENTMCNC: 35.4 G/DL (ref 31–37)
MCV RBC AUTO: 87.1 FL (ref 74–97)
MONOCYTES # BLD: 0.4 K/UL (ref 0.05–1.2)
MONOCYTES NFR BLD: 8 % (ref 3–10)
NEUTS SEG # BLD: 2.6 K/UL (ref 1.8–8)
NEUTS SEG NFR BLD: 53 % (ref 40–73)
PLATELET # BLD AUTO: 220 K/UL (ref 135–420)
PMV BLD AUTO: 10.3 FL (ref 9.2–11.8)
POTASSIUM SERPL-SCNC: 4.6 MMOL/L (ref 3.5–5.5)
PROT SERPL-MCNC: 6.1 G/DL (ref 6.4–8.2)
RBC # BLD AUTO: 4.28 M/UL (ref 4.35–5.65)
SODIUM SERPL-SCNC: 138 MMOL/L (ref 136–145)
WBC # BLD AUTO: 4.9 K/UL (ref 4.6–13.2)

## 2021-05-13 PROCEDURE — 36415 COLL VENOUS BLD VENIPUNCTURE: CPT

## 2021-05-13 PROCEDURE — 80053 COMPREHEN METABOLIC PANEL: CPT

## 2021-05-13 PROCEDURE — 85025 COMPLETE CBC W/AUTO DIFF WBC: CPT

## 2021-05-13 PROCEDURE — U0003 INFECTIOUS AGENT DETECTION BY NUCLEIC ACID (DNA OR RNA); SEVERE ACUTE RESPIRATORY SYNDROME CORONAVIRUS 2 (SARS-COV-2) (CORONAVIRUS DISEASE [COVID-19]), AMPLIFIED PROBE TECHNIQUE, MAKING USE OF HIGH THROUGHPUT TECHNOLOGIES AS DESCRIBED BY CMS-2020-01-R: HCPCS

## 2021-05-13 PROCEDURE — 83036 HEMOGLOBIN GLYCOSYLATED A1C: CPT

## 2021-05-13 PROCEDURE — 93005 ELECTROCARDIOGRAM TRACING: CPT

## 2021-05-13 NOTE — PROGRESS NOTES
Cardiac clearance form from [facility] filled out and signed per Dr. Koenig Outkayley. . Patient is cleared from cardiac standpoint and may stop Plavix for 5 days prior to surgery. Form faxed to their office and confirmation fax received.

## 2021-05-14 LAB
ATRIAL RATE: 65 BPM
CALCULATED P AXIS, ECG09: 51 DEGREES
CALCULATED R AXIS, ECG10: 20 DEGREES
CALCULATED T AXIS, ECG11: 53 DEGREES
DIAGNOSIS, 93000: NORMAL
Q-T INTERVAL, ECG07: 416 MS
QRS DURATION, ECG06: 118 MS
QTC CALCULATION (BEZET), ECG08: 375 MS
SARS-COV-2, COV2NT: NOT DETECTED
VENTRICULAR RATE, ECG03: 49 BPM

## 2021-05-17 ENCOUNTER — ANESTHESIA EVENT (OUTPATIENT)
Dept: SURGERY | Age: 56
End: 2021-05-17
Payer: COMMERCIAL

## 2021-05-17 NOTE — H&P
Melba Choe is a 60 y.o. male right handed unspecified employment. Worker's Compensation and legal considerations: none filed.         Vitals:     05/10/21 1623   Pulse: (!) 53   Resp: 15   Temp: 97.2 °F (36.2 °C)   SpO2: 97%   Weight: 203 lb (92.1 kg)   Height: 5' 11\" (1.803 m)   PainSc:   5   PainLoc: Arm                    Chief Complaint   Patient presents with    Arm Pain       right         HPI: Patient presents today to schedule right cubital tunnel release preferably for next week. We have held a spot for him for this.     2/2021 HPI: Patient presents today for follow-up of bilateral upper extremity EMGs. He reports no change since his last visit. He reports a recent hemoglobin A1c of 8.3.     Initial HPI: Patient comes in today with complaints of bilateral hand weakness and numbness and tingling in the little finger and ring finger.     Date of onset: July 2020     Injury: No     Prior Treatment:  No     Numbness/ Tingling: Yes: Comment: Bilateral ring and little finger        ROS: Review of Systems - General ROS: negative  Psychological ROS: negative  ENT ROS: negative  Allergy and Immunology ROS: negative  Hematological and Lymphatic ROS: negative  Respiratory ROS: no cough, shortness of breath, or wheezing  Cardiovascular ROS: no chest pain or dyspnea on exertion  Gastrointestinal ROS: no abdominal pain, change in bowel habits, or black or bloody stools  Musculoskeletal ROS: positive for - pain in hand - bilateral  Neurological ROS: positive for - numbness/tingling and weakness  Dermatological ROS: negative          Past Medical History:   Diagnosis Date    Abnormal nuclear cardiac imaging test 01/31/2012     Mod inferior infarction w/mild-mod simona-infarct ischemia. LVE. EF 43%. Marked basal inferior hypk; otherwise mild-mod inferior, inferolateral, distal anteroapical hypk. TID index 1.07. Pos max EST suggests subtotal obstruction of RCA.       Arthritis      Calculus of kidney      Chronic pain      Coronary artery disease       Inferior MI in 1998 status post RCA stent; ISR in 1999 status post rotational atherectomy. Posterior wall MI 2002 with circumflex stent.  Depression      Dyslipidemia      History of echocardiogram 10/09/2012     EF 50-55%. No WMA. Gr 1 DDfx. RVSP normal.  LAE.  IVCE.  Hyperlipidemia      Hypertension       mild    Long term current use of anticoagulant therapy      Neuropathy      Non-insulin dependent diabetes mellitus      Renal duplex 12/02/2013     No significant RA stenosis. Patent bilateral renal veins w/o thrombosis.  S/P attempted coronary angioplasty 10/08/2012     Unsuccessful angioplasty of chronic RCA occlusion w/collaterals.  S/P cardiac cath 02/17/2012     %. ISR. LM patent. LAD patent. pD1 55%. mCX patent. OM1 80% (3 x 30 Bowdle stent o/lapped w/3 x 12 Bowdle stent; residual 0%). LVEDP 13. EF 50-55%.  Second degree AV block, Mobitz type I           Surgical History   Past Surgical History:   Procedure Laterality Date    COLONOSCOPY N/A 4/24/2019     COLON[facility]COPY performed by West Lou MD at [facility]COPY    HX APPENDECTOMY        HX ORTHOPAEDIC   1984     Fx C4-5    HX UROLOGICAL Right       kidney stone    HX UROLOGICAL Bilateral       orchiopexy for torsion    [place] CARDIAC SURG PROCEDURE UNLIST         stents x3                   Current Outpatient Medications   Medication Sig Dispense Refill    rOPINIRole (REQUIP) 0.5 mg tablet Take 0.5 mg by mouth once over twenty-four (24) hours.        TRULICITY 6.94 MV/1.6 mL sub-q pen 0.5 ML BY SUBCUTANEOUS ROUTE EVERY SEVEN (7) DAYS. 6 Pen 3    Insulin Needles, Disposable, (SHADE PEN NEEDLE) 32 gauge x 5/32\" ndle 1 Units by Does Not Apply route two (2) times a day. 200 Pen Needle 4    sertraline (ZOLOFT) 50 mg tablet TAKE 1 TAB BY MOUTH DAILY.  APPOINTMENT NEEDED TO ESTABLISH CARE 30 Tab 2    gabapentin (NEURONTIN) 800 mg tablet TAKE 1 TAB BY MOUTH 3 TIMES A DAY 90 Tab 0    hydroCHLOROthiazide (HYDRODIURIL) 25 mg tablet Take 1 Tab by mouth daily. Appointment needed to establish care  Indications: high blood pressure 30 Tab 2    flash glucose scanning reader (FREESTYLE BENSON 14 DAY READER) misc 1 Kit by Does Not Apply route three (3) times daily. E11.9 4 Each 2    flash glucose sensor (FREESTYLE BENSON 14 DAY SENSOR) kit Take BG reading TID (E11.9) 5 Kit 1    insulin glargine (LANTUS,BASAGLAR) 100 unit/mL (3 mL) inpn Take 10 units nightly 5 Adjustable Dose Pre-filled Pen Syringe 3    metFORMIN (GLUCOPHAGE) 1,000 mg tablet Take 0.5 Tabs by mouth two (2) times daily (with meals). Indications: type 2 diabetes mellitus 180 Tab 1    atorvastatin (LIPITOR) 40 mg tablet Take 1 Tab by mouth daily. 90 Tab 3    clopidogrel (PLAVIX) 75 mg tab Take 1 Tab by mouth daily. 90 Tab 3    amLODIPine (NORVASC) 10 mg tablet TAKE 1 TABLET DAILY 90 Tab 1    ramipril (ALTACE) 10 mg capsule Take 1 Cap by mouth daily. Indications: high blood pressure 90 Cap 1    traZODone (DESYREL) 100 mg tablet TAKE 1 TAB BY MOUTH NIGHTLY. APPOINTMENT NEEDED TO ESTABLISH CARE 90 Tab 1    fenofibrate nanocrystallized (TRICOR) 145 mg tablet TAKE 1 TABLET DAILY 90 Tab 1    Blood-Glucose Meter monitoring kit Use once daily as directed. 1 Kit 0    glucose blood VI test strips (BLOOD GLUCOSE TEST) strip Use daily as directed - must match his glucometer. 100 Strip 3    Lancets misc Use daily as directed 100 Each 3    aspirin delayed-release 81 mg tablet Take 81 mg by mouth daily.        MULTIVITAMIN PO Take 1 tablet by mouth every morning.             No Known Allergies           PE:      Physical Exam  Vitals signs and nursing note reviewed. Constitutional:       General: He is not in acute distress. Appearance: Normal appearance. He is not ill-appearing, toxic-appearing or diaphoretic. HENT:      Head: Normocephalic and atraumatic.       Nose: Nose normal.      Mouth/Throat: Mouth: Mucous membranes are moist.   Eyes:      Extraocular Movements: Extraocular movements intact. Pupils: Pupils are equal, round, and reactive to light. Neck:      Musculoskeletal: Normal range of motion and neck supple. Cardiovascular:      Pulses: Normal pulses. Pulmonary:      Effort: Pulmonary effort is normal. No respiratory distress. Abdominal:      General: Abdomen is flat. There is no distension. Musculoskeletal:         General: No swelling, tenderness, deformity or signs of injury. Right lower leg: No edema. Left lower leg: No edema. Skin:     General: Skin is warm and dry. Capillary Refill: Capillary refill takes less than 2 seconds. Findings: No bruising or erythema. Neurological:      Mental Status: He is alert and oriented to person, place, and time. Cranial Nerves: No cranial nerve deficit. Sensory: Sensory deficit present. Motor: Weakness present. Psychiatric:         Mood and Affect: Mood normal.         Behavior: Behavior normal.               NEUROVASCULAR: There is severe first webspace wasting and intrinsic atrophy. Patient is unable to abduct or adduct his digits.     Examination L R Examination L R   Carpal Comp. - - Pronator Comp. - -   Carpal Tinel - - Pronator Tinel - -   Phalen's - - Pronator Stress - -   Cubital Comp. + + Guyon Comp. - -   Cubital Tinel + + Guyon Tinel - -   Elbow Hyperflexion + + Adson's - -   Spurling's - - SC Comp. - -   PCB Median abn - - SC Tinel - -   Radial Tinel - - IC Comp. - -   Digital Tinel - - IC Tinel - -   Radial 2-Pt WNL WNL Ulnar 2-Pt WNL WNL      Radial Pulse: 2+  Capillary Refill: < 2 sec  Иван: Not Performed  Digital Иван: Not Performed        NCV & EMG Findings:  Evaluation of the left median motor nerve showed prolonged distal onset latency (4.4 ms).  The right median motor nerve showed prolonged distal onset latency (5.3 ms) and decreased conduction velocity (Elbow-Wrist, 45 m/s).   The left ulnar motor and the right ulnar motor nerves showed prolonged distal onset latency (L5.5, R4.3 ms), reduced amplitude (L0.9, R0.4 mV), decreased conduction velocity (B Elbow-Wrist, L31, R34 m/s), and decreased conduction velocity (A Elbow-B Elbow, L20, R30 m/s).  The left median sensory and the right median sensory nerves showed prolonged distal peak latency (L3.8, R4.5 ms), reduced amplitude (L4.2, R1.9 µV), and decreased conduction velocity (Wrist-2nd Digit, L37, R31 m/s).  The left ulnar sensory nerve showed no response (Wrist) and no response (B Elbow).  The right ulnar sensory nerve showed no response (Wrist).  All remaining nerves (as indicated in the following tables) were within normal limits.  Left vs. Right side comparison data for the median motor nerve indicates abnormal L-R latency difference (0.9 ms).  The ulnar motor nerve indicates abnormal L-R latency difference (1.2 ms) and abnormal L-R amplitude difference (55.6 %).  The median sensory nerve indicates abnormal L-R latency difference (0.7 ms).       Needle evaluation of the right first dorsal interosseous muscle showed increased insertional activity, increased spontaneous activity, diminished recruitment, and moderately decreased interference pattern.  The left first dorsal interosseous muscle showed increased insertional activity and moderately increased spontaneous activity.  The left flexor carpi ulnaris muscle showed increased motor unit amplitude.  All remaining muscles (as indicated in the following table) showed no evidence of electrical instability.       INTERPRETATION  This is an abnormal electrodiagnostic examination. These findings may be consistent with:   1. Length dependent sensorimotor peripheral neuropathy preferentially affecting the ulnar nerve bilaterally.  However, an entrapment neuropathy cannot be excluded - this is based on abnormalities throughout the NCS not attributable to entrapment.       CLINICAL INTERPRETATION  His electrodiagnostic findings most consistent with severe peripheral neuropathy are consistent with his symptoms.         Imaging:      None indicated            ICD-10-CM ICD-9-CM     1. Cubital tunnel syndrome, right  G56.21 354. 2     2. Cubital tunnel syndrome, bilateral  G56.23 354. 2              Plan:      We will proceed in schedule his right cubital tunnel release for next Tuesday. We will get all appropriate labs.     Informed consent and postoperative convalescence were thoroughly discussed at this visit.     This procedure has been fully reviewed with the patient and written informed consent has been obtained.     The patient was counseled at length about the risks of arminda Covid-19 during their perioperative period and any recovery window from their procedure.  The patient was made aware that arminda Covid-19  may worsen their prognosis for recovering from their procedure and lend to a higher morbidity and/or mortality risk.  All material risks, benefits, and reasonable alternatives including postponing the procedure were discussed.  The patient does  wish to proceed with the procedure at this time.           Follow-up and Dispositions    · Return for 2 weeks postop.            Plan was reviewed with patient, who verbalized agreement and understanding of the plan

## 2021-05-18 ENCOUNTER — HOSPITAL ENCOUNTER (OUTPATIENT)
Age: 56
Setting detail: OUTPATIENT SURGERY
Discharge: HOME | End: 2021-05-18
Attending: ORTHOPAEDIC SURGERY | Admitting: ORTHOPAEDIC SURGERY
Payer: COMMERCIAL

## 2021-05-18 ENCOUNTER — DOCUMENTATION ONLY (OUTPATIENT)
Dept: ORTHOPEDIC SURGERY | Age: 56
End: 2021-05-18

## 2021-05-18 ENCOUNTER — ANESTHESIA (OUTPATIENT)
Dept: SURGERY | Age: 56
End: 2021-05-18
Payer: COMMERCIAL

## 2021-05-18 VITALS
HEIGHT: 71 IN | TEMPERATURE: 97 F | SYSTOLIC BLOOD PRESSURE: 123 MMHG | HEART RATE: 61 BPM | BODY MASS INDEX: 28.02 KG/M2 | DIASTOLIC BLOOD PRESSURE: 79 MMHG | RESPIRATION RATE: 16 BRPM | OXYGEN SATURATION: 97 % | WEIGHT: 200.13 LBS

## 2021-05-18 DIAGNOSIS — Z98.890 S/P CUBITAL TUNNEL RELEASE: Primary | ICD-10-CM

## 2021-05-18 DIAGNOSIS — G56.21 CUBITAL TUNNEL SYNDROME, RIGHT: ICD-10-CM

## 2021-05-18 LAB
GLUCOSE BLD STRIP.AUTO-MCNC: 124 MG/DL (ref 70–110)
GLUCOSE BLD STRIP.AUTO-MCNC: 142 MG/DL (ref 70–110)

## 2021-05-18 PROCEDURE — 77030040361 HC SLV COMPR DVT MDII -B: Performed by: ORTHOPAEDIC SURGERY

## 2021-05-18 PROCEDURE — 76060000032 HC ANESTHESIA 0.5 TO 1 HR: Performed by: ORTHOPAEDIC SURGERY

## 2021-05-18 PROCEDURE — 76210000006 HC OR PH I REC 0.5 TO 1 HR: Performed by: ORTHOPAEDIC SURGERY

## 2021-05-18 PROCEDURE — 77030002933 HC SUT MCRYL J&J -A: Performed by: ORTHOPAEDIC SURGERY

## 2021-05-18 PROCEDURE — 77030010813: Performed by: ORTHOPAEDIC SURGERY

## 2021-05-18 PROCEDURE — 77030040356 HC CORD BPLR FRCP COVD -A: Performed by: ORTHOPAEDIC SURGERY

## 2021-05-18 PROCEDURE — 77030040922 HC BLNKT HYPOTHRM STRY -A: Performed by: ORTHOPAEDIC SURGERY

## 2021-05-18 PROCEDURE — 77030006689 HC BLD OPHTH BVR BD -A: Performed by: ORTHOPAEDIC SURGERY

## 2021-05-18 PROCEDURE — 01710 ANES PX NRV MUSC UPR A&E NOS: CPT | Performed by: ANESTHESIOLOGY

## 2021-05-18 PROCEDURE — 74011250636 HC RX REV CODE- 250/636: Performed by: NURSE ANESTHETIST, CERTIFIED REGISTERED

## 2021-05-18 PROCEDURE — 74011250636 HC RX REV CODE- 250/636: Performed by: ORTHOPAEDIC SURGERY

## 2021-05-18 PROCEDURE — 01710 ANES PX NRV MUSC UPR A&E NOS: CPT | Performed by: NURSE ANESTHETIST, CERTIFIED REGISTERED

## 2021-05-18 PROCEDURE — 2709999900 HC NON-CHARGEABLE SUPPLY: Performed by: ORTHOPAEDIC SURGERY

## 2021-05-18 PROCEDURE — 74011000250 HC RX REV CODE- 250: Performed by: ORTHOPAEDIC SURGERY

## 2021-05-18 PROCEDURE — 82962 GLUCOSE BLOOD TEST: CPT

## 2021-05-18 PROCEDURE — 64718 REVISE ULNAR NERVE AT ELBOW: CPT | Performed by: ORTHOPAEDIC SURGERY

## 2021-05-18 PROCEDURE — 76210000021 HC REC RM PH II 0.5 TO 1 HR: Performed by: ORTHOPAEDIC SURGERY

## 2021-05-18 PROCEDURE — 77030031139 HC SUT VCRL2 J&J -A: Performed by: ORTHOPAEDIC SURGERY

## 2021-05-18 PROCEDURE — 77030011266 HC ELECTRD BLD INSL COVD -A: Performed by: ORTHOPAEDIC SURGERY

## 2021-05-18 PROCEDURE — 76010000138 HC OR TIME 0.5 TO 1 HR: Performed by: ORTHOPAEDIC SURGERY

## 2021-05-18 PROCEDURE — 74011000250 HC RX REV CODE- 250: Performed by: NURSE ANESTHETIST, CERTIFIED REGISTERED

## 2021-05-18 PROCEDURE — 74011250637 HC RX REV CODE- 250/637: Performed by: NURSE ANESTHETIST, CERTIFIED REGISTERED

## 2021-05-18 RX ORDER — FENTANYL CITRATE 50 UG/ML
50 INJECTION, SOLUTION INTRAMUSCULAR; INTRAVENOUS AS NEEDED
Status: CANCELLED | OUTPATIENT
Start: 2021-05-18

## 2021-05-18 RX ORDER — LIDOCAINE HYDROCHLORIDE 20 MG/ML
INJECTION, SOLUTION EPIDURAL; INFILTRATION; INTRACAUDAL; PERINEURAL AS NEEDED
Status: DISCONTINUED | OUTPATIENT
Start: 2021-05-18 | End: 2021-05-18 | Stop reason: HOSPADM

## 2021-05-18 RX ORDER — SODIUM CHLORIDE 0.9 % (FLUSH) 0.9 %
5-40 SYRINGE (ML) INJECTION AS NEEDED
Status: DISCONTINUED | OUTPATIENT
Start: 2021-05-18 | End: 2021-05-18 | Stop reason: HOSPADM

## 2021-05-18 RX ORDER — HYDROCODONE BITARTRATE AND ACETAMINOPHEN 7.5; 325 MG/1; MG/1
1 TABLET ORAL
Qty: 16 TAB | Refills: 0 | Status: SHIPPED | OUTPATIENT
Start: 2021-05-18 | End: 2021-05-22

## 2021-05-18 RX ORDER — SODIUM CHLORIDE 0.9 % (FLUSH) 0.9 %
5-40 SYRINGE (ML) INJECTION EVERY 8 HOURS
Status: DISCONTINUED | OUTPATIENT
Start: 2021-05-18 | End: 2021-05-18 | Stop reason: HOSPADM

## 2021-05-18 RX ORDER — HYDROMORPHONE HYDROCHLORIDE 2 MG/ML
0.5 INJECTION, SOLUTION INTRAMUSCULAR; INTRAVENOUS; SUBCUTANEOUS
Status: CANCELLED | OUTPATIENT
Start: 2021-05-18

## 2021-05-18 RX ORDER — SODIUM CHLORIDE, SODIUM LACTATE, POTASSIUM CHLORIDE, CALCIUM CHLORIDE 600; 310; 30; 20 MG/100ML; MG/100ML; MG/100ML; MG/100ML
50 INJECTION, SOLUTION INTRAVENOUS CONTINUOUS
Status: DISCONTINUED | OUTPATIENT
Start: 2021-05-18 | End: 2021-05-18 | Stop reason: HOSPADM

## 2021-05-18 RX ORDER — MAGNESIUM SULFATE 100 %
4 CRYSTALS MISCELLANEOUS AS NEEDED
Status: CANCELLED | OUTPATIENT
Start: 2021-05-18

## 2021-05-18 RX ORDER — SODIUM CHLORIDE 0.9 % (FLUSH) 0.9 %
5-40 SYRINGE (ML) INJECTION EVERY 8 HOURS
Status: CANCELLED | OUTPATIENT
Start: 2021-05-18

## 2021-05-18 RX ORDER — PROPOFOL 10 MG/ML
INJECTION, EMULSION INTRAVENOUS AS NEEDED
Status: DISCONTINUED | OUTPATIENT
Start: 2021-05-18 | End: 2021-05-18 | Stop reason: HOSPADM

## 2021-05-18 RX ORDER — GLYCOPYRROLATE 0.2 MG/ML
INJECTION INTRAMUSCULAR; INTRAVENOUS AS NEEDED
Status: DISCONTINUED | OUTPATIENT
Start: 2021-05-18 | End: 2021-05-18 | Stop reason: HOSPADM

## 2021-05-18 RX ORDER — MIDAZOLAM HYDROCHLORIDE 1 MG/ML
INJECTION, SOLUTION INTRAMUSCULAR; INTRAVENOUS AS NEEDED
Status: DISCONTINUED | OUTPATIENT
Start: 2021-05-18 | End: 2021-05-18 | Stop reason: HOSPADM

## 2021-05-18 RX ORDER — INSULIN LISPRO 100 [IU]/ML
INJECTION, SOLUTION INTRAVENOUS; SUBCUTANEOUS ONCE
Status: DISCONTINUED | OUTPATIENT
Start: 2021-05-18 | End: 2021-05-18 | Stop reason: HOSPADM

## 2021-05-18 RX ORDER — FAMOTIDINE 20 MG/1
TABLET, FILM COATED ORAL
Status: DISCONTINUED
Start: 2021-05-18 | End: 2021-05-18 | Stop reason: HOSPADM

## 2021-05-18 RX ORDER — ONDANSETRON 2 MG/ML
4 INJECTION INTRAMUSCULAR; INTRAVENOUS ONCE
Status: CANCELLED | OUTPATIENT
Start: 2021-05-18 | End: 2021-05-18

## 2021-05-18 RX ORDER — FAMOTIDINE 20 MG/1
20 TABLET, FILM COATED ORAL ONCE
Status: COMPLETED | OUTPATIENT
Start: 2021-05-18 | End: 2021-05-18

## 2021-05-18 RX ORDER — ONDANSETRON 2 MG/ML
INJECTION INTRAMUSCULAR; INTRAVENOUS AS NEEDED
Status: DISCONTINUED | OUTPATIENT
Start: 2021-05-18 | End: 2021-05-18 | Stop reason: HOSPADM

## 2021-05-18 RX ORDER — FENTANYL CITRATE 50 UG/ML
INJECTION, SOLUTION INTRAMUSCULAR; INTRAVENOUS AS NEEDED
Status: DISCONTINUED | OUTPATIENT
Start: 2021-05-18 | End: 2021-05-18 | Stop reason: HOSPADM

## 2021-05-18 RX ORDER — SODIUM CHLORIDE 0.9 % (FLUSH) 0.9 %
5-40 SYRINGE (ML) INJECTION AS NEEDED
Status: CANCELLED | OUTPATIENT
Start: 2021-05-18

## 2021-05-18 RX ORDER — BUPIVACAINE HYDROCHLORIDE 2.5 MG/ML
INJECTION, SOLUTION EPIDURAL; INFILTRATION; INTRACAUDAL AS NEEDED
Status: DISCONTINUED | OUTPATIENT
Start: 2021-05-18 | End: 2021-05-18 | Stop reason: HOSPADM

## 2021-05-18 RX ORDER — CEFAZOLIN SODIUM 2 G/50ML
2 SOLUTION INTRAVENOUS ONCE
Status: COMPLETED | OUTPATIENT
Start: 2021-05-18 | End: 2021-05-18

## 2021-05-18 RX ORDER — DEXTROSE 50 % IN WATER (D50W) INTRAVENOUS SYRINGE
25-50 AS NEEDED
Status: CANCELLED | OUTPATIENT
Start: 2021-05-18

## 2021-05-18 RX ORDER — BACITRACIN ZINC 500 UNIT/G
OINTMENT (GRAM) TOPICAL AS NEEDED
Status: DISCONTINUED | OUTPATIENT
Start: 2021-05-18 | End: 2021-05-18 | Stop reason: HOSPADM

## 2021-05-18 RX ADMIN — MIDAZOLAM HYDROCHLORIDE 2 MG: 2 INJECTION, SOLUTION INTRAMUSCULAR; INTRAVENOUS at 08:26

## 2021-05-18 RX ADMIN — CEFAZOLIN SODIUM 2 G: 2 SOLUTION INTRAVENOUS at 08:36

## 2021-05-18 RX ADMIN — ONDANSETRON 4 MG: 2 INJECTION INTRAMUSCULAR; INTRAVENOUS at 08:53

## 2021-05-18 RX ADMIN — SODIUM CHLORIDE, SODIUM LACTATE, POTASSIUM CHLORIDE, AND CALCIUM CHLORIDE 50 ML/HR: 600; 310; 30; 20 INJECTION, SOLUTION INTRAVENOUS at 08:11

## 2021-05-18 RX ADMIN — GLYCOPYRROLATE 0.2 MG: 0.2 INJECTION INTRAMUSCULAR; INTRAVENOUS at 08:31

## 2021-05-18 RX ADMIN — LIDOCAINE HYDROCHLORIDE 80 MG: 20 INJECTION, SOLUTION EPIDURAL; INFILTRATION; INTRACAUDAL; PERINEURAL at 08:34

## 2021-05-18 RX ADMIN — PROPOFOL 200 MG: 10 INJECTION, EMULSION INTRAVENOUS at 08:34

## 2021-05-18 RX ADMIN — FENTANYL CITRATE 50 MCG: 50 INJECTION, SOLUTION INTRAMUSCULAR; INTRAVENOUS at 08:34

## 2021-05-18 RX ADMIN — GLYCOPYRROLATE 0.2 MG: 0.2 INJECTION INTRAMUSCULAR; INTRAVENOUS at 08:33

## 2021-05-18 RX ADMIN — FAMOTIDINE 20 MG: 20 TABLET, FILM COATED ORAL at 08:06

## 2021-05-18 NOTE — PERIOP NOTES
Pre-Op Summary    Pt arrived via car with family/friend and is oriented to time, place, person and situation. Patient with steady gait with none assistive devices. Visit Vitals  BP (!) 163/89 (BP 1 Location: Left upper arm, BP Patient Position: At rest)   Pulse 62   Temp 98 °F (36.7 °C)   Resp 20   Ht 5' 11\" (1.803 m)   Wt 90.8 kg (200 lb 2 oz)   SpO2 97%   BMI 27.91 kg/m²       Peripheral IV located on Left forearm. Patients belongings are located locked in unit. Patient's point of contact is wife, Edson Mota and their contact number is: [phone]. They will be leaving and coming back. They are able to receive medication information. They will be their ride home.

## 2021-05-18 NOTE — H&P
Update History & Physical    The Patient's History and Physical of May 10,   2021 was reviewed with the patient and I examined the patient. There was no change. The surgical site was confirmed by the patient and me. Plan:  The risk, benefits, expected outcome, and alternative to the recommended procedure have been discussed with the patient. Patient understands and wants to proceed with the procedure.     Electronically signed by Kelsi Jaquez DO on 5/18/2021 at 7:28 AM

## 2021-05-18 NOTE — ANESTHESIA PREPROCEDURE EVALUATION
Relevant Problems   No relevant active problems       Anesthetic History   No history of anesthetic complications            Review of Systems / Medical History  Patient summary reviewed and pertinent labs reviewed    Pulmonary  Within defined limits                 Neuro/Psych         Psychiatric history     Cardiovascular    Hypertension: well controlled        Dysrhythmias   Past MI, CAD and cardiac stents    Exercise tolerance: >4 METS     GI/Hepatic/Renal                Endo/Other    Diabetes: well controlled, type 2    Arthritis     Other Findings              Physical Exam    Airway  Mallampati: III  TM Distance: 4 - 6 cm  Neck ROM: decreased range of motion   Mouth opening: Normal     Cardiovascular    Rhythm: regular  Rate: normal         Dental  No notable dental hx       Pulmonary  Breath sounds clear to auscultation               Abdominal  GI exam deferred       Other Findings            Anesthetic Plan    ASA: 3  Anesthesia type: general          Induction: Intravenous  Anesthetic plan and risks discussed with: Patient

## 2021-05-18 NOTE — DISCHARGE INSTRUCTIONS
ICE AND ELEVATE ELBOW    BEGIN GENTLE ROM OF ELBOW    KEEP DRESSING CLEAN AND DRY AND COVER FOR SHOWERING    REMOVE DRESSING ON Friday, [address] AND DRY, AND LEAVE OPEN TO AIR. SHOWER LIKE NORMAL AFTER REMOVAL. RESTART ASPIRIN AND PLAVIX ON Friday    . DISCHARGE SUMMARY from Nurse  PATIENT INSTRUCTIONS:  After general anesthesia or intravenous sedation, for 24 hours or while taking prescription Narcotics:  · Limit your activities  · Do not drive and operate hazardous machinery  · Do not make important personal or business decisions  · Do  not drink alcoholic beverages  · If you have not urinated within 8 hours after discharge, please contact your surgeon on call. Report the following to your surgeon:  · Excessive pain, swelling, redness or odor of or around the surgical area  · Temperature over 100.5  · Nausea and vomiting lasting longer than 4 hours or if unable to take medications  · Any signs of decreased circulation or nerve impairment to extremity: change in color, persistent  numbness, tingling, coldness or increase pain  · Any questions    What to do at Home:  Recommended activity: Activity as tolerated and no driving for today. *  Please give a list of your current medications to your Primary Care Provider. *  Please update this list whenever your medications are discontinued, doses are      changed, or new medications (including over-the-counter products) are added. *  Please carry medication information at all times in case of emergency situations. These are general instructions for a healthy lifestyle:    No smoking/ No tobacco products/ Avoid exposure to second hand smoke  Surgeon General's Warning:  Quitting smoking now greatly reduces serious risk to your health.     Obesity, smoking, and sedentary lifestyle greatly increases your risk for illness    A healthy diet, regular physical exercise & weight monitoring are important for maintaining a healthy lifestyle    You may be retaining fluid if you have a history of heart failure or if you experience any of the following symptoms:  Weight gain of 3 pounds or more overnight or 5 pounds in a week, increased swelling in our hands or feet or shortness of breath while lying flat in bed. Please call your doctor as soon as you notice any of these symptoms; do not wait until your next office visit. The discharge information has been reviewed with the patient. The patient verbalized understanding. Discharge medications reviewed with the patient and appropriate educational materials and side effects teaching were provided. ___________________________________________________________________________________________________________________________________  . Patient armband removed and shredded

## 2021-05-18 NOTE — BRIEF OP NOTE
Brief Postoperative Note    Patient: Josette Hinton  YOB: 1965  MRN: [id number]    Date of Procedure: 5/18/2021     Pre-Op Diagnosis: G56.21 RIGHT CUBITAL TUNEL SYNDROME    Post-Op Diagnosis: Same as preoperative diagnosis. Procedure(s):  RIGHT CUBITAL TUNNEL RELEASE    Surgeon(s):   Louis Rodrigues DO    Surgical Assistant: Surg Asst-1: Marlene Cheng    Anesthesia: General     Estimated Blood Loss (mL): less than 50     Complications: None    Specimens: * No specimens in log *     Implants: * No implants in log *    Drains: * No LDAs found *    Findings: scarred ulnar nerve at elbow    Electronically Signed by Love Merritt DO on 5/18/2021 at 9:09 AM

## 2021-05-18 NOTE — OP NOTES
Operative Report    Patient: Flynn Mckeon MRN: [id number]  SSN: [id number]    YOB: 1965  Age: 60 y.o. Sex: male       Date of Surgery: 5/18/2021     Preoperative Diagnosis: G56.21 RIGHT CUBITAL TUNEL SYNDROME     Postoperative Diagnosis: G56.21 RIGHT CUBITAL TUNEL SYNDROME     Surgeon(s) and Role:     Mary Kinney, Alexa Brandt, DO - Primary    Assistant: Naya Fernandez    Anesthesia: General and local    Procedure: Procedure(s):  RIGHT CUBITAL TUNNEL RELEASE [id number]    Findings: Scarred ulnar nerve at level of elbow    Procedure in Detail:     Indications for procedure been outlined in the perioperative documentation, most notably being not amenable to conservative treatment. Informed consent was obtained from the patient. The risks and benefits of the procedure were discussed with the patient. They include but are not limited to neurovascular injury, tendon/ligamentous injury, blood loss, infection, need for further surgery, hematoma, neuroma, seroma, chronic pain, chronic stiffness, complications from anesthesia including death, and the possibility of arminda Covid. After informed consent was obtained, the patient was taken back to the operative suite. A sterile tourniquet was applied to the operative extremity and it was prepped and draped in the normal sterile fashion. The arm was exsanguinated and the tourniquet was elevated to 250 mmHg. Attention was turned to the elbow where a medial curvilinear incision was made jail between the medial epicondyle and the olecranon. The subcutaneous tissues were dissected and electrocautery was used for hemostasis. The ulnar nerve was identified at the level of the medial epicondyle. At this point Trinidad's ligament was incised and attention was first turned distally where the FCU aponeurosis was incised.   A limited neurolysis of the ulnar nerve was undertaken and the ulnar nerve was found to be completely released up to the mid forearm. Attention was then turned again to the medial epicondyle with the remainder of Trinidad's ligament and Trinidad's fascia was incised. Any remaining scar tissue or constrictive bands were released about the ulnar nerve up to the level of the sterile tourniquet. Again a limited neurolysis was also undertaken proximally. The elbow was run through range of motion and the ulnar nerve was not found to be subluxing or perching on the medial epicondyle. The ulnar nerve was found to be completely released throughout the cubital tunnel. The wound was copiously irrigated and 20 mL of quarter percent Marcaine plain was injected into the subcutaneous tissues as well as the periosteum surrounding the ulnar nerve. The wound was closed with 3-0 Monocryl followed by 4-0 Vicryl rapide at the end of the procedure. Electrocautery was used for hemostasis once the tourniquet was let down. The operative field was cleansed and dried and placed into a sterile dressing, and splint if indicated. The patient was sent to recovery in stable condition and given appropriate wound care instructions, follow-up with me in the outpatient setting, and a prescription for pain medicine. Estimated Blood Loss:  20 mL    Tourniquet Time:   Total Tourniquet Time Documented:  Wrist (Right) - 12 minutes  Total: Wrist (Right) - 12 minutes        Implants: * No implants in log *            Specimens: * No specimens in log *        Drains: None                Complications: None    Counts: Sponge and needle counts were correct times two.     Signed By:  Gem Fajardo DO     May 18, 2021

## 2021-05-18 NOTE — ANESTHESIA POSTPROCEDURE EVALUATION
Procedure(s):  RIGHT CUBITAL TUNNEL RELEASE.    general    Anesthesia Post Evaluation      Multimodal analgesia: multimodal analgesia used between 6 hours prior to anesthesia start to PACU discharge  Patient location during evaluation: bedside  Patient participation: complete - patient participated  Level of consciousness: awake  Pain management: adequate  Airway patency: patent  Anesthetic complications: no  Cardiovascular status: stable  Respiratory status: acceptable  Hydration status: acceptable  Post anesthesia nausea and vomiting:  controlled      INITIAL Post-op Vital signs:   Vitals Value Taken Time   /68 05/18/21 0956   Temp 36 °C (96.8 °F) 05/18/21 0930   Pulse 68 05/18/21 1004   Resp 15 05/18/21 1004   SpO2 91 % 05/18/21 1004   Vitals shown include unvalidated device data.

## 2021-05-24 DIAGNOSIS — G56.21 CUBITAL TUNNEL SYNDROME, RIGHT: Primary | ICD-10-CM

## 2021-05-24 NOTE — TELEPHONE ENCOUNTER
Last Visit: 5/18/21 post-op MD Ronel Interiano  Next Appointment: 6/2/21 with MD Rodney Pean  Previous Refill Encounter(s): 5/18/21 #16    Requested Prescriptions     Pending Prescriptions Disp Refills    HYDROcodone-acetaminophen (NORCO) 7.5-325 mg per tablet 16 Tablet 0     Sig: Take 1 Tablet by mouth every six (6) hours as needed for Pain for up to 4 days. Max Daily Amount: 4 Tablets.

## 2021-05-25 ENCOUNTER — TELEPHONE (OUTPATIENT)
Dept: ORTHOPEDIC SURGERY | Age: 56
End: 2021-05-25

## 2021-05-25 RX ORDER — HYDROCODONE BITARTRATE AND ACETAMINOPHEN 5; 325 MG/1; MG/1
1 TABLET ORAL
Qty: 12 TABLET | Refills: 0 | Status: SHIPPED | OUTPATIENT
Start: 2021-05-25 | End: 2021-05-28

## 2021-05-25 RX ORDER — HYDROCODONE BITARTRATE AND ACETAMINOPHEN 7.5; 325 MG/1; MG/1
1 TABLET ORAL
Qty: 16 TABLET | Refills: 0 | Status: CANCELLED | OUTPATIENT
Start: 2021-05-25 | End: 2021-05-29

## 2021-05-25 NOTE — TELEPHONE ENCOUNTER
[phone] Patient    Patient is asking if he can swim in a salt water pool. He is status post surgery 05/18/21. Please advise.

## 2021-05-26 NOTE — TELEPHONE ENCOUNTER
Please let him know that he cannot get in the pool until at least 2 to 3 weeks postop. We will evaluate at his first postop visit to determine when the first time he can do this is.

## 2021-06-01 ENCOUNTER — TELEPHONE (OUTPATIENT)
Dept: ORTHOPEDIC SURGERY | Age: 56
End: 2021-06-01

## 2021-06-01 NOTE — TELEPHONE ENCOUNTER
Please instruct patient to wash and dry thoroughly. Also have him  some Steri-Strips from the pharmacy and applied to the wound today if possible. It is okay to wait until tomorrow to see him at the postop.

## 2021-06-01 NOTE — TELEPHONE ENCOUNTER
Called patient and left generic VM. If patient calls back, instruct per Dr Radha Landrum:    Please instruct patient to wash and dry thoroughly. Also have him  some Steri-Strips from the pharmacy and applied to the wound today if possible. It is okay to wait until tomorrow to see him at the postop. Please instruct patient to wash and dry thoroughly. Also have him  some Steri-Strips from the pharmacy and applied to the wound today if possible. It is okay to wait until tomorrow to see him at the postop.

## 2021-06-01 NOTE — TELEPHONE ENCOUNTER
Patient called stating when he extended his hand out yesterday to pet his dog his stitches busted open. He's wondering if it's still okay for him to wait until tomorrow for his post-op appointment or if someone might want to see him today. Please advise patient back at [phone].

## 2021-06-02 ENCOUNTER — OFFICE VISIT (OUTPATIENT)
Dept: ORTHOPEDIC SURGERY | Age: 56
End: 2021-06-02
Payer: COMMERCIAL

## 2021-06-02 VITALS
HEIGHT: 71 IN | HEART RATE: 71 BPM | BODY MASS INDEX: 27.72 KG/M2 | TEMPERATURE: 97.8 F | WEIGHT: 198 LBS | OXYGEN SATURATION: 99 %

## 2021-06-02 DIAGNOSIS — G56.23 CUBITAL TUNNEL SYNDROME, BILATERAL: ICD-10-CM

## 2021-06-02 DIAGNOSIS — Z98.890 S/P CUBITAL TUNNEL RELEASE: ICD-10-CM

## 2021-06-02 DIAGNOSIS — G56.21 CUBITAL TUNNEL SYNDROME, RIGHT: ICD-10-CM

## 2021-06-02 DIAGNOSIS — T81.49XA WOUND INFECTION AFTER SURGERY: Primary | ICD-10-CM

## 2021-06-02 PROCEDURE — 99024 POSTOP FOLLOW-UP VISIT: CPT | Performed by: ORTHOPAEDIC SURGERY

## 2021-06-02 RX ORDER — SULFAMETHOXAZOLE AND TRIMETHOPRIM 800; 160 MG/1; MG/1
1 TABLET ORAL 2 TIMES DAILY
Qty: 20 TABLET | Refills: 0 | Status: SHIPPED | OUTPATIENT
Start: 2021-06-02 | End: 2021-06-12

## 2021-06-02 NOTE — PROGRESS NOTES
Juno Santiago is a 60 y.o. male right handed unspecified employment. Worker's Compensation and legal considerations: none filed. Vitals:    06/02/21 0923   Pulse: 71   Temp: 97.8 °F (36.6 °C)   TempSrc: Temporal   SpO2: 99%   Weight: 198 lb (89.8 kg)   Height: 5' 11\" (1.803 m)   PainSc:   3   PainLoc: Elbow           Chief Complaint   Patient presents with    Elbow Pain     right elbow       HPI: Patient returns today for his first postop visit 2 weeks status post right cubital tunnel release. He reports his wound opening up proximally a few days ago. He also reports some redness around the wound. Preop right HPI: Patient presents today to schedule right cubital tunnel release preferably for next week. We have held a spot for him for this. 2/2021 HPI: Patient presents today for follow-up of bilateral upper extremity EMGs. He reports no change since his last visit. He reports a recent hemoglobin A1c of 8.3. Initial HPI: Patient comes in today with complaints of bilateral hand weakness and numbness and tingling in the little finger and ring finger. Date of onset: July 2020    Injury: No    Prior Treatment:  No    Numbness/ Tingling: Yes: Comment: Bilateral ring and little finger      ROS: Review of Systems - General ROS: negative  Psychological ROS: negative  ENT ROS: negative  Allergy and Immunology ROS: negative  Hematological and Lymphatic ROS: negative  Respiratory ROS: no cough, shortness of breath, or wheezing  Cardiovascular ROS: no chest pain or dyspnea on exertion  Gastrointestinal ROS: no abdominal pain, change in bowel habits, or black or bloody stools  Musculoskeletal ROS: positive for - pain in hand - bilateral  Neurological ROS: positive for - numbness/tingling and weakness  Dermatological ROS: negative    Past Medical History:   Diagnosis Date    Abnormal nuclear cardiac imaging test 01/31/2012    Mod inferior infarction w/mild-mod simona-infarct ischemia. LVE. EF 43%.   Marked basal inferior hypk; otherwise mild-mod inferior, inferolateral, distal anteroapical hypk. TID index 1.07. Pos max EST suggests subtotal obstruction of RCA.  Arthritis     Calculus of kidney     Chronic pain     Coronary artery disease     Inferior MI in 1998 status post RCA stent; ISR in 1999 status post rotational atherectomy. Posterior wall MI 2002 with circumflex stent.  Depression     Dyslipidemia     History of echocardiogram 10/09/2012    EF 50-55%. No WMA. Gr 1 DDfx. RVSP normal.  LAE.  IVCE.  Hyperlipidemia     Hypertension     mild    Long term current use of anticoagulant therapy     Neuropathy     Non-insulin dependent diabetes mellitus     Renal duplex 12/02/2013    No significant RA stenosis. Patent bilateral renal veins w/o thrombosis.  S/P attempted coronary angioplasty 10/08/2012    Unsuccessful angioplasty of chronic RCA occlusion w/collaterals.  S/P cardiac cath 02/17/2012    %. ISR. LM patent. LAD patent. pD1 55%. mCX patent. OM1 80% (3 x 30 [place] stent o/lapped w/3 x 12 [place] stent; residual 0%). LVEDP 13. EF 50-55%.  Second degree AV block, Mobitz type I        Past Surgical History:   Procedure Laterality Date    COLONOSCOPY N/A 4/24/2019    COLONOSCOPY performed by Viji [facility] at [facility] END[facility]    HX APPENDECTOMY      HX ORTHOPAEDIC  1984    Fx C4-5    HX UROLOGICAL Right     kidney stone    HX UROLOGICAL Bilateral     orchiopexy for torsion    TX CARDIAC SURG PROCEDURE UNLIST      stents x3    UPPER ARM/ELBOW SURGERY UNLISTED Right        Current Outpatient Medications   Medication Sig Dispense Refill    trimethoprim-sulfamethoxazole (BACTRIM DS, SEPTRA DS) 160-800 mg per tablet Take 1 Tablet by mouth two (2) times a day for 10 days. 20 Tablet 0    rOPINIRole (REQUIP) 0.5 mg tablet Take 0.5 mg by mouth once over twenty-four (24) hours.       TRULICITY 7.68 AJ/0.5 mL sub-q pen 0.5 ML BY SUBCUTANEOUS ROUTE EVERY SEVEN (7) DAYS. 6 Pen 3    Insulin Needles, Disposable, (SHADE PEN NEEDLE) 32 gauge x 5/32\" ndle 1 Units by Does Not Apply route two (2) times a day. 200 Pen Needle 4    sertraline (ZOLOFT) 50 mg tablet TAKE 1 TAB BY MOUTH DAILY. APPOINTMENT NEEDED TO ESTABLISH CARE 30 Tab 2    hydroCHLOROthiazide (HYDRODIURIL) 25 mg tablet Take 1 Tab by mouth daily. Appointment needed to establish care  Indications: high blood pressure 30 Tab 2    flash glucose scanning reader (FREESTYLE BENSON 14 DAY READER) misc 1 Kit by Does Not Apply route three (3) times daily. E11.9 4 Each 2    flash glucose sensor (FREESTYLE BENSON 14 DAY SENSOR) kit Take BG reading TID (E11.9) 5 Kit 1    insulin glargine (LANTUS,BASAGLAR) 100 unit/mL (3 mL) inpn Take 10 units nightly 5 Adjustable Dose Pre-filled Pen Syringe 3    metFORMIN (GLUCOPHAGE) 1,000 mg tablet Take 0.5 Tabs by mouth two (2) times daily (with meals). Indications: type 2 diabetes mellitus 180 Tab 1    atorvastatin (LIPITOR) 40 mg tablet Take 1 Tab by mouth daily. 90 Tab 3    amLODIPine (NORVASC) 10 mg tablet TAKE 1 TABLET DAILY 90 Tab 1    ramipril (ALTACE) 10 mg capsule Take 1 Cap by mouth daily. Indications: high blood pressure 90 Cap 1    traZODone (DESYREL) 100 mg tablet TAKE 1 TAB BY MOUTH NIGHTLY. APPOINTMENT NEEDED TO ESTABLISH CARE 90 Tab 1    fenofibrate nanocrystallized (TRICOR) 145 mg tablet TAKE 1 TABLET DAILY 90 Tab 1    Blood-Glucose Meter monitoring kit Use once daily as directed. 1 Kit 0    glucose blood VI test strips (BLOOD GLUCOSE TEST) strip Use daily as directed - must match his glucometer. 100 Strip 3    Lancets misc Use daily as directed 100 Each 3    MULTIVITAMIN PO Take 1 tablet by mouth every morning.  gabapentin (NEURONTIN) 800 mg tablet TAKE 1 TAB BY MOUTH 3 TIMES A DAY (Patient not taking: Reported on 6/2/2021) 90 Tab 0       No Known Allergies        PE:     Physical Exam  Vitals and nursing note reviewed.    Constitutional:       General: He is not in acute distress. Appearance: Normal appearance. He is not ill-appearing, toxic-appearing or diaphoretic. Cardiovascular:      Pulses: Normal pulses. Pulmonary:      Effort: Pulmonary effort is normal. No respiratory distress. Abdominal:      General: Abdomen is flat. There is no distension. Musculoskeletal:         General: Tenderness present. No swelling, deformity or signs of injury. Cervical back: Normal range of motion and neck supple. Right lower leg: No edema. Left lower leg: No edema. Skin:     General: Skin is warm and dry. Capillary Refill: Capillary refill takes less than 2 seconds. Findings: Erythema present. No bruising. Neurological:      Mental Status: He is alert and oriented to person, place, and time. Cranial Nerves: No cranial nerve deficit. Sensory: Sensory deficit present. Motor: Weakness present. Psychiatric:         Mood and Affect: Mood normal.         Behavior: Behavior normal.          Right elbow: There appears to be cellulitis around the incision. This is likely a reaction to the absorbable suture. There is mild superficial dehiscence proximally. Sensation is intact distally and cap refill is brisk. NEUROVASCULAR: There is severe first webspace wasting and intrinsic atrophy. Patient is unable to abduct or adduct his digits. Examination L R Examination L R   Carpal Comp. - - Pronator Comp. - -   Carpal Tinel - - Pronator Tinel - -   Phalen's - - Pronator Stress - -   Cubital Comp. +  Guyon Comp. - -   Cubital Tinel +  [place] Tinel - -   Elbow Hyperflexion +  Adson's - -   Spurling's - - SC Comp. - -   PCB Median abn - - SC Tinel - -   Radial Tinel - - IC Comp.  - -   Digital Tinel - - IC Tinel - -   Radial 2-Pt WNL WNL Ulnar 2-Pt WNL WNL     Radial Pulse: 2+  Capillary Refill: < 2 sec  Иван: Not Performed  [employer]: Not Performed      NCV & EMG Findings:  Evaluation of the left median motor nerve showed prolonged distal onset latency (4.4 ms). The right median motor nerve showed prolonged distal onset latency (5.3 ms) and decreased conduction velocity (Elbow-Wrist, 45 m/s). The left ulnar motor and the right ulnar motor nerves showed prolonged distal onset latency (L5.5, R4.3 ms), reduced amplitude (L0.9, R0.4 mV), decreased conduction velocity (B Elbow-Wrist, L31, R34 m/s), and decreased conduction velocity (A Elbow-B Elbow, L20, R30 m/s). The left median sensory and the right median sensory nerves showed prolonged distal peak latency (L3.8, R4.5 ms), reduced amplitude (L4.2, R1.9 µV), and decreased conduction velocity (Wrist-2nd Digit, L37, R31 m/s). The left ulnar sensory nerve showed no response (Wrist) and no response (B Elbow). The right ulnar sensory nerve showed no response (Wrist). All remaining nerves (as indicated in the following tables) were within normal limits. Left vs. Right side comparison data for the median motor nerve indicates abnormal L-R latency difference (0.9 ms). The ulnar motor nerve indicates abnormal L-R latency difference (1.2 ms) and abnormal L-R amplitude difference (55.6 %). The median sensory nerve indicates abnormal L-R latency difference (0.7 ms).       Needle evaluation of the right first dorsal interosseous muscle showed increased insertional activity, increased spontaneous activity, diminished recruitment, and moderately decreased interference pattern. The left first dorsal interosseous muscle showed increased insertional activity and moderately increased spontaneous activity. The left flexor carpi ulnaris muscle showed increased motor unit amplitude. All remaining muscles (as indicated in the following table) showed no evidence of electrical instability.       INTERPRETATION  This is an abnormal electrodiagnostic examination. These findings may be consistent with:   1.  Length dependent sensorimotor peripheral neuropathy preferentially affecting the ulnar nerve bilaterally. However, an entrapment neuropathy cannot be excluded - this is based on abnormalities throughout the NCS not attributable to entrapment.       CLINICAL INTERPRETATION  His electrodiagnostic findings most consistent with severe peripheral neuropathy are consistent with his symptoms. Imaging:     None indicated        ICD-10-CM ICD-9-CM    1. Wound infection after surgery  T81.49XA 998.59 trimethoprim-sulfamethoxazole (BACTRIM DS, SEPTRA DS) 160-800 mg per tablet   2. Cubital tunnel syndrome, right  G56.21 354.2    3. S/P cubital tunnel release  Z98.890 V45.89    4. Cubital tunnel syndrome, bilateral  G56.23 354.2          Plan:     Bactrim for 10 days. Steri-Strips placed today. Instructed patient on hygiene of wound. Follow-up and Dispositions    · Return in about 2 weeks (around 6/16/2021) for Reevaluation and wound check.           Plan was reviewed with patient, who verbalized agreement and understanding of the plan

## 2021-06-16 ENCOUNTER — OFFICE VISIT (OUTPATIENT)
Dept: ORTHOPEDIC SURGERY | Age: 56
End: 2021-06-16
Payer: COMMERCIAL

## 2021-06-16 VITALS
WEIGHT: 199 LBS | HEIGHT: 71 IN | HEART RATE: 67 BPM | OXYGEN SATURATION: 98 % | BODY MASS INDEX: 27.86 KG/M2 | RESPIRATION RATE: 15 BRPM

## 2021-06-16 DIAGNOSIS — T81.49XA WOUND INFECTION AFTER SURGERY: ICD-10-CM

## 2021-06-16 DIAGNOSIS — Z01.818 PREOP EXAMINATION: Primary | ICD-10-CM

## 2021-06-16 DIAGNOSIS — G56.21 CUBITAL TUNNEL SYNDROME, RIGHT: ICD-10-CM

## 2021-06-16 DIAGNOSIS — Z98.890 S/P CUBITAL TUNNEL RELEASE: ICD-10-CM

## 2021-06-16 DIAGNOSIS — G56.22 CUBITAL TUNNEL SYNDROME, LEFT: Primary | ICD-10-CM

## 2021-06-16 PROCEDURE — 99024 POSTOP FOLLOW-UP VISIT: CPT | Performed by: ORTHOPAEDIC SURGERY

## 2021-06-16 NOTE — PROGRESS NOTES
Melania Lee is a 60 y.o. male right handed unspecified employment. Worker's Compensation and legal considerations: none filed. Vitals:    06/16/21 1057   Pulse: 67   Resp: 15   SpO2: 98%   Weight: 199 lb (90.3 kg)   Height: 5' 11\" (1.803 m)   PainSc:   0 - No pain   PainLoc: Elbow           Chief Complaint   Patient presents with    Elbow Pain     right elbow       HPI: Patient returns today to set up tunnel release as well as follow-up for his right cubital tunnel release 4 weeks status post release. At his last visit we had started him on Bactrim due to significant redness around the incision likely due to a reaction to the suture. He says it has significantly resolved though there is still some swelling in the elbow as well as some redness. He says it is much better. 2wk HPI: Patient returns today for his first postop visit 2 weeks status post right cubital tunnel release. He reports his wound opening up proximally a few days ago. He also reports some redness around the wound. Preop right HPI: Patient presents today to schedule right cubital tunnel release preferably for next week. We have held a spot for him for this. 2/2021 HPI: Patient presents today for follow-up of bilateral upper extremity EMGs. He reports no change since his last visit. He reports a recent hemoglobin A1c of 8.3. Initial HPI: Patient comes in today with complaints of bilateral hand weakness and numbness and tingling in the little finger and ring finger.     Date of onset: July 2020    Injury: No    Prior Treatment:  Yes: Comment: Right cubital tunnel release    Numbness/ Tingling: Yes: Comment: Bilateral ring and little finger      ROS: Review of Systems - General ROS: negative  Psychological ROS: negative  ENT ROS: negative  Allergy and Immunology ROS: negative  Hematological and Lymphatic ROS: negative  Respiratory ROS: no cough, shortness of breath, or wheezing  Cardiovascular ROS: no chest pain or dyspnea on exertion  Gastrointestinal ROS: no abdominal pain, change in bowel habits, or black or bloody stools  Musculoskeletal ROS: positive for - pain in hand - bilateral  Neurological ROS: positive for - numbness/tingling and weakness  Dermatological ROS: negative    Past Medical History:   Diagnosis Date    Abnormal nuclear cardiac imaging test 01/31/2012    Mod inferior infarction w/mild-mod simona-infarct ischemia. LVE. EF 43%. Marked basal inferior hypk; otherwise mild-mod inferior, inferolateral, distal anteroapical hypk. TID index 1.07. Pos max EST suggests subtotal obstruction of RCA.  Arthritis     Calculus of kidney     Chronic pain     Coronary artery disease     Inferior MI in 1998 status post RCA stent; ISR in 1999 status post rotational atherectomy. Posterior wall MI 2002 with circumflex stent.  Depression     Dyslipidemia     History of echocardiogram 10/09/2012    EF 50-55%. No WMA. Gr 1 DDfx. RVSP normal.  LAE.  IVCE.  Hyperlipidemia     Hypertension     mild    Long term current use of anticoagulant therapy     Neuropathy     Non-insulin dependent diabetes mellitus     Renal duplex 12/02/2013    No significant RA stenosis. Patent bilateral renal veins w/o thrombosis.  S/P attempted coronary angioplasty 10/08/2012    Unsuccessful angioplasty of chronic RCA occlusion w/collaterals.  S/P cardiac cath 02/17/2012    %. ISR. LM patent. LAD patent. pD1 55%. mCX patent. OM1 80% (3 x 30 [place] stent o/lapped w/3 x 12 [place] stent; residual 0%). LVEDP 13. EF 50-55%.       Second degree AV block, Mobitz type I        Past Surgical History:   Procedure Laterality Date    COLONOSCOPY N/A 4/24/2019    COLONOSCOPY performed [facility] Emir [facility] at [facility] ENDOSCOPY    HX APPENDECTOMY      HX [address]    Fx C4-5    HX UROLOGICAL Right     kidney stone    HX UROLOGICAL Bilateral     orchiopexy for torsion    [place] CARDIAC SURG PROCEDURE UNLIST stents x3    UPPER ARM/ELBOW SURGERY UNLISTED Right        Current Outpatient Medications   Medication Sig Dispense Refill    rOPINIRole (REQUIP) 0.5 mg tablet Take 0.5 mg by mouth once over twenty-four (24) hours.  TRULICITY 4.28 XO/9.0 mL sub-q pen 0.5 ML BY SUBCUTANEOUS ROUTE EVERY SEVEN (7) DAYS. 6 Pen 3    Insulin Needles, Disposable, (SHADE PEN NEEDLE) 32 gauge x 5/32\" ndle 1 Units by Does Not Apply route two (2) times a day. 200 Pen Needle 4    sertraline (ZOLOFT) 50 mg tablet TAKE 1 TAB BY MOUTH DAILY. APPOINTMENT NEEDED TO ESTABLISH CARE 30 Tab 2    hydroCHLOROthiazide (HYDRODIURIL) 25 mg tablet Take 1 Tab by mouth daily. Appointment needed to establish care  Indications: high blood pressure 30 Tab 2    flash glucose scanning reader (FREESTYLE BENSON 14 DAY READER) misc 1 Kit by Does Not Apply route three (3) times daily. E11.9 4 Each 2    flash glucose sensor (FREESTYLE BENSON 14 DAY SENSOR) kit Take BG reading TID (E11.9) 5 Kit 1    insulin glargine (LANTUS,BASAGLAR) 100 unit/mL (3 mL) inpn Take 10 units nightly 5 Adjustable Dose Pre-filled Pen Syringe 3    metFORMIN (GLUCOPHAGE) 1,000 mg tablet Take 0.5 Tabs by mouth two (2) times daily (with meals). Indications: type 2 diabetes mellitus 180 Tab 1    atorvastatin (LIPITOR) 40 mg tablet Take 1 Tab by mouth daily. 90 Tab 3    amLODIPine (NORVASC) 10 mg tablet TAKE 1 TABLET DAILY 90 Tab 1    ramipril (ALTACE) 10 mg capsule Take 1 Cap by mouth daily. Indications: high blood pressure 90 Cap 1    traZODone (DESYREL) 100 mg tablet TAKE 1 TAB BY MOUTH NIGHTLY. APPOINTMENT NEEDED TO ESTABLISH CARE 90 Tab 1    fenofibrate nanocrystallized (TRICOR) 145 mg tablet TAKE 1 TABLET DAILY 90 Tab 1    Blood-Glucose Meter monitoring kit Use once daily as directed. 1 Kit 0    glucose blood VI test strips (BLOOD GLUCOSE TEST) strip Use daily as directed - must match his glucometer.  100 Strip 3    Lancets misc Use daily as directed 100 Each 3    MULTIVITAMIN PO Take 1 tablet by mouth every morning.  gabapentin (NEURONTIN) 800 mg tablet TAKE 1 TAB BY MOUTH 3 TIMES A DAY (Patient not taking: Reported on 6/2/2021) 90 Tab 0       No Known Allergies        PE:     Physical Exam  Vitals and nursing note reviewed. Constitutional:       General: He is not in acute distress. Appearance: Normal appearance. He is not ill-appearing, toxic-appearing or diaphoretic. HENT:      Head: Normocephalic and atraumatic. Nose: Nose normal.      Mouth/Throat:      Mouth: Mucous membranes are moist.   Eyes:      Extraocular Movements: Extraocular movements intact. Pupils: Pupils are equal, round, and reactive to light. Cardiovascular:      Pulses: Normal pulses. Pulmonary:      Effort: Pulmonary effort is normal. No respiratory distress. Abdominal:      General: Abdomen is flat. There is no distension. Musculoskeletal:         General: No swelling, tenderness, deformity or signs of injury. Cervical back: Normal range of motion and neck supple. Right lower leg: No edema. Left lower leg: No edema. Skin:     General: Skin is warm and dry. Capillary Refill: Capillary refill takes less than 2 seconds. Findings: No bruising or erythema. Neurological:      Mental Status: He is alert and oriented to person, place, and time. Cranial Nerves: No cranial nerve deficit. Sensory: No sensory deficit. Motor: Weakness present. Psychiatric:         Mood and Affect: Mood normal.         Behavior: Behavior normal.          Right elbow: Cellulitis is much improved if not completely resolved. There is some residual erythema around the incision. There is no tenderness to palpation there is some residual edema around the elbow. Sensation grossly intact distally and cap refill brisk. NEUROVASCULAR: There is severe first webspace wasting and intrinsic atrophy.   Patient is unable to abduct or adduct his digits. Examination L R Examination L R   Carpal Comp. - - Pronator Comp. - -   Carpal Tinel - - Pronator Tinel - -   Phalen's - - Pronator Stress - -   Cubital Comp. +  [place] Comp. - -   Cubital Tinel +  [place] Tinel - -   Elbow Hyperflexion +  Adson's - -   Spurling's - - SC Comp. - -   PCB Median abn - - SC Tinel - -   Radial Tinel - - IC Comp. - -   Digital Tinel - - IC Tinel - -   Radial 2-Pt WNL WNL Ulnar 2-Pt WNL WNL     Radial Pulse: 2+  Capillary Refill: < 2 sec  Иван: Not Performed  [employer]: Not Performed      NCV & EMG Findings:  Evaluation of the left median motor nerve showed prolonged distal onset latency (4.4 ms). The right median motor nerve showed prolonged distal onset latency (5.3 ms) and decreased conduction velocity (Elbow-Wrist, 45 m/s). The left ulnar motor and the right ulnar motor nerves showed prolonged distal onset latency (L5.5, R4.3 ms), reduced amplitude (L0.9, R0.4 mV), decreased conduction velocity (B Elbow-Wrist, L31, R34 m/s), and decreased conduction velocity (A Elbow-B Elbow, L20, R30 m/s). The left median sensory and the right median sensory nerves showed prolonged distal peak latency (L3.8, R4.5 ms), reduced amplitude (L4.2, R1.9 µV), and decreased conduction velocity (Wrist-2nd Digit, L37, R31 m/s). The left ulnar sensory nerve showed no response (Wrist) and no response (B Elbow). The right ulnar sensory nerve showed no response (Wrist). All remaining nerves (as indicated in the following tables) were within normal limits. Left vs. Right side comparison data for the median motor nerve indicates abnormal L-R latency difference (0.9 ms). The ulnar motor nerve indicates abnormal L-R latency difference (1.2 ms) and abnormal L-R amplitude difference (55.6 %).   The median sensory nerve indicates abnormal L-R latency difference (0.7 ms).       Needle evaluation of the right first dorsal interosseous muscle showed increased insertional activity, increased spontaneous activity, diminished recruitment, and moderately decreased interference pattern. The left first dorsal interosseous muscle showed increased insertional activity and moderately increased spontaneous activity. The left flexor carpi ulnaris muscle showed increased motor unit amplitude. All remaining muscles (as indicated in the following table) showed no evidence of electrical instability.       INTERPRETATION  This is an abnormal electrodiagnostic examination. These findings may be consistent with:   1. Length dependent sensorimotor peripheral neuropathy preferentially affecting the ulnar nerve bilaterally. However, an entrapment neuropathy cannot be excluded - this is based on abnormalities throughout the NCS not attributable to entrapment.       CLINICAL INTERPRETATION  His electrodiagnostic findings most consistent with severe peripheral neuropathy are consistent with his symptoms. Imaging:     None indicated        ICD-10-CM ICD-9-CM    1. Cubital tunnel syndrome, left  G56.22 354.2 SCHEDULE SURGERY   2. Cubital tunnel syndrome, right  G56.21 354.2    3. Wound infection after surgery  T81.49XA 998.59    4. S/P cubital tunnel release  Z98.890 V45.89          Plan:     Schedule left cubital tunnel release and continue to observe healing of right side. At this point for his neck surgery we will use a different closure including monofilament suture and Dermabond. Follow-up and Dispositions    · Return for 2 weeks postop.           Plan was reviewed with patient, who verbalized agreement and understanding of the plan

## 2021-06-17 ENCOUNTER — DOCUMENTATION ONLY (OUTPATIENT)
Dept: ORTHOPEDIC SURGERY | Age: 56
End: 2021-06-17

## 2021-06-17 NOTE — PROGRESS NOTES
[facility], request for additional information, was received via fax and placed in the forms bin at .

## 2021-06-22 ENCOUNTER — DOCUMENTATION ONLY (OUTPATIENT)
Dept: ORTHOPEDIC SURGERY | Age: 56
End: 2021-06-22

## 2021-06-24 ENCOUNTER — HOSPITAL ENCOUNTER (OUTPATIENT)
Dept: PREADMISSION TESTING | Age: 56
Discharge: HOME | End: 2021-06-24
Payer: COMMERCIAL

## 2021-06-24 DIAGNOSIS — Z01.818 PREOP EXAMINATION: ICD-10-CM

## 2021-06-24 LAB
ALBUMIN SERPL-MCNC: 3.3 G/DL (ref 3.4–5)
ALBUMIN/GLOB SERPL: 1.2 {RATIO} (ref 0.8–1.7)
ALP SERPL-CCNC: 44 U/L (ref 45–117)
ALT SERPL-CCNC: 22 U/L (ref 16–61)
ANION GAP SERPL CALC-SCNC: 2 MMOL/L (ref 3–18)
AST SERPL-CCNC: 15 U/L (ref 10–38)
BASOPHILS # BLD: 0.1 K/UL (ref 0–0.1)
BASOPHILS NFR BLD: 2 % (ref 0–2)
BILIRUB SERPL-MCNC: 0.3 MG/DL (ref 0.2–1)
BUN SERPL-MCNC: 22 MG/DL (ref 7–18)
BUN/CREAT SERPL: 18 (ref 12–20)
CALCIUM SERPL-MCNC: 9 MG/DL (ref 8.5–10.1)
CHLORIDE SERPL-SCNC: 103 MMOL/L (ref 100–111)
CO2 SERPL-SCNC: 33 MMOL/L (ref 21–32)
CREAT SERPL-MCNC: 1.2 MG/DL (ref 0.6–1.3)
DIFFERENTIAL METHOD BLD: ABNORMAL
EOSINOPHIL # BLD: 0.5 K/UL (ref 0–0.4)
EOSINOPHIL NFR BLD: 9 % (ref 0–5)
ERYTHROCYTE [DISTWIDTH] IN BLOOD BY AUTOMATED COUNT: 12.4 % (ref 11.6–14.5)
GLOBULIN SER CALC-MCNC: 2.7 G/DL (ref 2–4)
GLUCOSE SERPL-MCNC: 304 MG/DL (ref 74–99)
HCT VFR BLD AUTO: 34.2 % (ref 36–48)
HGB BLD-MCNC: 11.5 G/DL (ref 13–16)
LYMPHOCYTES # BLD: 1.4 K/UL (ref 0.9–3.6)
LYMPHOCYTES NFR BLD: 26 % (ref 21–52)
MCH RBC QN AUTO: 30.3 PG (ref 24–34)
MCHC RBC AUTO-ENTMCNC: 33.6 G/DL (ref 31–37)
MCV RBC AUTO: 90 FL (ref 74–97)
MONOCYTES # BLD: 0.4 K/UL (ref 0.05–1.2)
MONOCYTES NFR BLD: 8 % (ref 3–10)
NEUTS SEG # BLD: 2.9 K/UL (ref 1.8–8)
NEUTS SEG NFR BLD: 55 % (ref 40–73)
PLATELET # BLD AUTO: 214 K/UL (ref 135–420)
PMV BLD AUTO: 10.8 FL (ref 9.2–11.8)
POTASSIUM SERPL-SCNC: 4.6 MMOL/L (ref 3.5–5.5)
PROT SERPL-MCNC: 6 G/DL (ref 6.4–8.2)
RBC # BLD AUTO: 3.8 M/UL (ref 4.35–5.65)
SODIUM SERPL-SCNC: 138 MMOL/L (ref 136–145)
WBC # BLD AUTO: 5.3 K/UL (ref 4.6–13.2)

## 2021-06-24 PROCEDURE — 80053 COMPREHEN METABOLIC PANEL: CPT

## 2021-06-24 PROCEDURE — 36415 COLL VENOUS BLD VENIPUNCTURE: CPT

## 2021-06-24 PROCEDURE — U0003 INFECTIOUS AGENT DETECTION BY NUCLEIC ACID (DNA OR RNA); SEVERE ACUTE RESPIRATORY SYNDROME CORONAVIRUS 2 (SARS-COV-2) (CORONAVIRUS DISEASE [COVID-19]), AMPLIFIED PROBE TECHNIQUE, MAKING USE OF HIGH THROUGHPUT TECHNOLOGIES AS DESCRIBED BY CMS-2020-01-R: HCPCS

## 2021-06-24 PROCEDURE — 85025 COMPLETE CBC W/AUTO DIFF WBC: CPT

## 2021-06-25 LAB — SARS-COV-2, COV2NT: NOT DETECTED

## 2021-06-25 NOTE — PERIOP NOTES
PRE-SURGICAL INSTRUCTIONS        Patient's Name:  Katya Worthington      OQXDH'S Date:  6/25/2021              Surgery Date:  6/29/2021                1. Do NOT eat or drink anything, including candy, gum, or ice chips after midnight on 7/5/2021, unless you have specific instructions from your surgeon or anesthesia provider to do so.  2. You may brush your teeth before coming to the hospital.  3. No smoking 24 hours prior to the day of surgery. 4. No alcohol 24 hours prior to the day of surgery. 5. No recreational drugs for one week prior to the day of surgery. 6. Leave all valuables, including money/purse, at home. 7. Remove all jewelry, nail polish, acrylic nails, and makeup (including mascara); no lotions powders, deodorant, or perfume/cologne/after shave on the skin. 8. Glasses/contact lenses and dentures may be worn to the hospital.  They will be removed prior to surgery. 9. Call your doctor if symptoms of a cold or illness develop within 24-48 hours prior to your surgery. 10.  AN ADULT MUST DRIVE YOU HOME AFTER OUTPATIENT SURGERY. 11.  If you are having an outpatient procedure, please make arrangements for a responsible adult to be with you for 24 hours after your surgery. 12.  NO VISITORS in the hospital at this time for outpatient procedures. Exceptions may be made for surgical admissions, per nursing unit guidelines      Special Instructions:      Bring list of CURRENT medications. Bring inhaler. Bring CPAP machine. Bring any pertinent legal medical records. Take these medications the morning of surgery with a sip of water:  Amlodipine  Follow physician instructions about insulin. Follow physician instructions about stopping anticoagulants. Complete bowel prep per MD instructions. On the day of surgery, come in the main entrance of [facility]. Let the  at the desk know you are there for surgery.   A staff member will come escort you to the surgical area on the second floor. If you have any questions or concerns, please do not hesitate to call:     (Prior to the day of surgery) PAT department:  [phone]   (Day of surgery) Pre-Op department:  [phone]    These surgical instructions were reviewed with patient during the PAT phone call.

## 2021-06-28 ENCOUNTER — ANESTHESIA EVENT (OUTPATIENT)
Dept: SURGERY | Age: 56
End: 2021-06-28
Payer: COMMERCIAL

## 2021-06-29 ENCOUNTER — ANESTHESIA (OUTPATIENT)
Dept: SURGERY | Age: 56
End: 2021-06-29
Payer: COMMERCIAL

## 2021-06-29 ENCOUNTER — HOSPITAL ENCOUNTER (OUTPATIENT)
Age: 56
Setting detail: OUTPATIENT SURGERY
Discharge: HOME | End: 2021-06-29
Attending: ORTHOPAEDIC SURGERY | Admitting: ORTHOPAEDIC SURGERY
Payer: COMMERCIAL

## 2021-06-29 VITALS
WEIGHT: 198.6 LBS | SYSTOLIC BLOOD PRESSURE: 149 MMHG | BODY MASS INDEX: 27.8 KG/M2 | TEMPERATURE: 97.6 F | OXYGEN SATURATION: 95 % | HEIGHT: 71 IN | RESPIRATION RATE: 9 BRPM | HEART RATE: 66 BPM | DIASTOLIC BLOOD PRESSURE: 77 MMHG

## 2021-06-29 DIAGNOSIS — G56.22 CUBITAL TUNNEL SYNDROME, LEFT: Primary | ICD-10-CM

## 2021-06-29 DIAGNOSIS — Z98.890 S/P CUBITAL TUNNEL RELEASE: ICD-10-CM

## 2021-06-29 LAB
AMPHET UR QL SCN: NEGATIVE
BARBITURATES UR QL SCN: NEGATIVE
BENZODIAZ UR QL: NEGATIVE
CANNABINOIDS UR QL SCN: NEGATIVE
COCAINE UR QL SCN: NEGATIVE
GLUCOSE BLD STRIP.AUTO-MCNC: 125 MG/DL (ref 70–110)
GLUCOSE BLD STRIP.AUTO-MCNC: 133 MG/DL (ref 70–110)
HDSCOM,HDSCOM: ABNORMAL
METHADONE UR QL: NEGATIVE
OPIATES UR QL: POSITIVE
PCP UR QL: NEGATIVE

## 2021-06-29 PROCEDURE — 76210000020 HC REC RM PH II FIRST 0.5 HR: Performed by: ORTHOPAEDIC SURGERY

## 2021-06-29 PROCEDURE — 77030002933 HC SUT MCRYL J&J -A: Performed by: ORTHOPAEDIC SURGERY

## 2021-06-29 PROCEDURE — 76010000138 HC OR TIME 0.5 TO 1 HR: Performed by: ORTHOPAEDIC SURGERY

## 2021-06-29 PROCEDURE — 77030040361 HC SLV COMPR DVT MDII -B: Performed by: ORTHOPAEDIC SURGERY

## 2021-06-29 PROCEDURE — 64718 REVISE ULNAR NERVE AT ELBOW: CPT | Performed by: ORTHOPAEDIC SURGERY

## 2021-06-29 PROCEDURE — 74011250636 HC RX REV CODE- 250/636: Performed by: NURSE ANESTHETIST, CERTIFIED REGISTERED

## 2021-06-29 PROCEDURE — 76060000032 HC ANESTHESIA 0.5 TO 1 HR: Performed by: ORTHOPAEDIC SURGERY

## 2021-06-29 PROCEDURE — 74011000250 HC RX REV CODE- 250: Performed by: ORTHOPAEDIC SURGERY

## 2021-06-29 PROCEDURE — 01710 ANES PX NRV MUSC UPR A&E NOS: CPT | Performed by: NURSE ANESTHETIST, CERTIFIED REGISTERED

## 2021-06-29 PROCEDURE — 2709999900 HC NON-CHARGEABLE SUPPLY: Performed by: ORTHOPAEDIC SURGERY

## 2021-06-29 PROCEDURE — 82962 GLUCOSE BLOOD TEST: CPT

## 2021-06-29 PROCEDURE — 74011000250 HC RX REV CODE- 250: Performed by: NURSE ANESTHETIST, CERTIFIED REGISTERED

## 2021-06-29 PROCEDURE — 77030006689 HC BLD OPHTH BVR BD -A: Performed by: ORTHOPAEDIC SURGERY

## 2021-06-29 PROCEDURE — 80307 DRUG TEST PRSMV CHEM ANLYZR: CPT

## 2021-06-29 PROCEDURE — 77030040356 HC CORD BPLR FRCP COVD -A: Performed by: ORTHOPAEDIC SURGERY

## 2021-06-29 PROCEDURE — 77030010813: Performed by: ORTHOPAEDIC SURGERY

## 2021-06-29 PROCEDURE — 76210000063 HC OR PH I REC FIRST 0.5 HR: Performed by: ORTHOPAEDIC SURGERY

## 2021-06-29 PROCEDURE — 77030011266 HC ELECTRD BLD INSL COVD -A: Performed by: ORTHOPAEDIC SURGERY

## 2021-06-29 PROCEDURE — 77030040922 HC BLNKT HYPOTHRM STRY -A: Performed by: ORTHOPAEDIC SURGERY

## 2021-06-29 PROCEDURE — 74011250637 HC RX REV CODE- 250/637: Performed by: NURSE ANESTHETIST, CERTIFIED REGISTERED

## 2021-06-29 PROCEDURE — 01710 ANES PX NRV MUSC UPR A&E NOS: CPT | Performed by: ANESTHESIOLOGY

## 2021-06-29 RX ORDER — LIDOCAINE HYDROCHLORIDE 10 MG/ML
0.1 INJECTION, SOLUTION EPIDURAL; INFILTRATION; INTRACAUDAL; PERINEURAL AS NEEDED
Status: DISCONTINUED | OUTPATIENT
Start: 2021-06-29 | End: 2021-06-29 | Stop reason: HOSPADM

## 2021-06-29 RX ORDER — SODIUM CHLORIDE 0.9 % (FLUSH) 0.9 %
5-40 SYRINGE (ML) INJECTION EVERY 8 HOURS
Status: DISCONTINUED | OUTPATIENT
Start: 2021-06-29 | End: 2021-06-29 | Stop reason: HOSPADM

## 2021-06-29 RX ORDER — SODIUM CHLORIDE 0.9 % (FLUSH) 0.9 %
5-40 SYRINGE (ML) INJECTION AS NEEDED
Status: DISCONTINUED | OUTPATIENT
Start: 2021-06-29 | End: 2021-06-29 | Stop reason: HOSPADM

## 2021-06-29 RX ORDER — SODIUM CHLORIDE, SODIUM LACTATE, POTASSIUM CHLORIDE, CALCIUM CHLORIDE 600; 310; 30; 20 MG/100ML; MG/100ML; MG/100ML; MG/100ML
75 INJECTION, SOLUTION INTRAVENOUS CONTINUOUS
Status: DISCONTINUED | OUTPATIENT
Start: 2021-06-29 | End: 2021-06-29 | Stop reason: HOSPADM

## 2021-06-29 RX ORDER — HYDROMORPHONE HYDROCHLORIDE 2 MG/ML
0.5 INJECTION, SOLUTION INTRAMUSCULAR; INTRAVENOUS; SUBCUTANEOUS
Status: CANCELLED | OUTPATIENT
Start: 2021-06-29

## 2021-06-29 RX ORDER — ONDANSETRON 2 MG/ML
4 INJECTION INTRAMUSCULAR; INTRAVENOUS
Status: CANCELLED | OUTPATIENT
Start: 2021-06-29 | End: 2021-06-30

## 2021-06-29 RX ORDER — HYDROCODONE BITARTRATE AND ACETAMINOPHEN 7.5; 325 MG/1; MG/1
1 TABLET ORAL
Qty: 16 TABLET | Refills: 0 | Status: SHIPPED | OUTPATIENT
Start: 2021-06-29 | End: 2021-07-03

## 2021-06-29 RX ORDER — INSULIN LISPRO 100 [IU]/ML
INJECTION, SOLUTION INTRAVENOUS; SUBCUTANEOUS ONCE
Status: CANCELLED | OUTPATIENT
Start: 2021-06-29 | End: 2021-06-29

## 2021-06-29 RX ORDER — MIDAZOLAM HYDROCHLORIDE 1 MG/ML
INJECTION, SOLUTION INTRAMUSCULAR; INTRAVENOUS AS NEEDED
Status: DISCONTINUED | OUTPATIENT
Start: 2021-06-29 | End: 2021-06-29 | Stop reason: HOSPADM

## 2021-06-29 RX ORDER — DEXTROSE 50 % IN WATER (D50W) INTRAVENOUS SYRINGE
25-50 AS NEEDED
Status: CANCELLED | OUTPATIENT
Start: 2021-06-29

## 2021-06-29 RX ORDER — INSULIN LISPRO 100 [IU]/ML
INJECTION, SOLUTION INTRAVENOUS; SUBCUTANEOUS ONCE
Status: DISCONTINUED | OUTPATIENT
Start: 2021-06-29 | End: 2021-06-29 | Stop reason: HOSPADM

## 2021-06-29 RX ORDER — GLYCOPYRROLATE 0.2 MG/ML
INJECTION INTRAMUSCULAR; INTRAVENOUS AS NEEDED
Status: DISCONTINUED | OUTPATIENT
Start: 2021-06-29 | End: 2021-06-29 | Stop reason: HOSPADM

## 2021-06-29 RX ORDER — DEXTROSE 50 % IN WATER (D50W) INTRAVENOUS SYRINGE
25-50 AS NEEDED
Status: DISCONTINUED | OUTPATIENT
Start: 2021-06-29 | End: 2021-06-29 | Stop reason: HOSPADM

## 2021-06-29 RX ORDER — PROPOFOL 10 MG/ML
INJECTION, EMULSION INTRAVENOUS AS NEEDED
Status: DISCONTINUED | OUTPATIENT
Start: 2021-06-29 | End: 2021-06-29 | Stop reason: HOSPADM

## 2021-06-29 RX ORDER — MAGNESIUM SULFATE 100 %
4 CRYSTALS MISCELLANEOUS AS NEEDED
Status: DISCONTINUED | OUTPATIENT
Start: 2021-06-29 | End: 2021-06-29 | Stop reason: HOSPADM

## 2021-06-29 RX ORDER — BUPIVACAINE HYDROCHLORIDE 2.5 MG/ML
INJECTION, SOLUTION EPIDURAL; INFILTRATION; INTRACAUDAL AS NEEDED
Status: DISCONTINUED | OUTPATIENT
Start: 2021-06-29 | End: 2021-06-29 | Stop reason: HOSPADM

## 2021-06-29 RX ORDER — FENTANYL CITRATE 50 UG/ML
INJECTION, SOLUTION INTRAMUSCULAR; INTRAVENOUS AS NEEDED
Status: DISCONTINUED | OUTPATIENT
Start: 2021-06-29 | End: 2021-06-29 | Stop reason: HOSPADM

## 2021-06-29 RX ORDER — FAMOTIDINE 20 MG/1
20 TABLET, FILM COATED ORAL ONCE
Status: COMPLETED | OUTPATIENT
Start: 2021-06-29 | End: 2021-06-29

## 2021-06-29 RX ORDER — ONDANSETRON 2 MG/ML
INJECTION INTRAMUSCULAR; INTRAVENOUS AS NEEDED
Status: DISCONTINUED | OUTPATIENT
Start: 2021-06-29 | End: 2021-06-29 | Stop reason: HOSPADM

## 2021-06-29 RX ORDER — MAGNESIUM SULFATE 100 %
4 CRYSTALS MISCELLANEOUS AS NEEDED
Status: CANCELLED | OUTPATIENT
Start: 2021-06-29

## 2021-06-29 RX ORDER — CEFAZOLIN SODIUM 1 G/3ML
INJECTION, POWDER, FOR SOLUTION INTRAMUSCULAR; INTRAVENOUS AS NEEDED
Status: DISCONTINUED | OUTPATIENT
Start: 2021-06-29 | End: 2021-06-29 | Stop reason: HOSPADM

## 2021-06-29 RX ORDER — SODIUM CHLORIDE, SODIUM LACTATE, POTASSIUM CHLORIDE, CALCIUM CHLORIDE 600; 310; 30; 20 MG/100ML; MG/100ML; MG/100ML; MG/100ML
75 INJECTION, SOLUTION INTRAVENOUS CONTINUOUS
Status: CANCELLED | OUTPATIENT
Start: 2021-06-29

## 2021-06-29 RX ORDER — LIDOCAINE HYDROCHLORIDE 20 MG/ML
INJECTION, SOLUTION EPIDURAL; INFILTRATION; INTRACAUDAL; PERINEURAL AS NEEDED
Status: DISCONTINUED | OUTPATIENT
Start: 2021-06-29 | End: 2021-06-29 | Stop reason: HOSPADM

## 2021-06-29 RX ADMIN — PROPOFOL 150 MG: 10 INJECTION, EMULSION INTRAVENOUS at 09:40

## 2021-06-29 RX ADMIN — ONDANSETRON 4 MG: 2 INJECTION INTRAMUSCULAR; INTRAVENOUS at 10:11

## 2021-06-29 RX ADMIN — SODIUM CHLORIDE, SODIUM LACTATE, POTASSIUM CHLORIDE, AND CALCIUM CHLORIDE 75 ML/HR: 600; 310; 30; 20 INJECTION, SOLUTION INTRAVENOUS at 09:11

## 2021-06-29 RX ADMIN — FENTANYL CITRATE 100 MCG: 50 INJECTION, SOLUTION INTRAMUSCULAR; INTRAVENOUS at 09:33

## 2021-06-29 RX ADMIN — GLYCOPYRROLATE 0.2 MG: 0.2 INJECTION INTRAMUSCULAR; INTRAVENOUS at 09:56

## 2021-06-29 RX ADMIN — MIDAZOLAM HYDROCHLORIDE 2 MG: 2 INJECTION, SOLUTION INTRAMUSCULAR; INTRAVENOUS at 09:33

## 2021-06-29 RX ADMIN — CEFAZOLIN 2 G: 1 INJECTION, POWDER, FOR SOLUTION INTRAMUSCULAR; INTRAVENOUS at 09:43

## 2021-06-29 RX ADMIN — LIDOCAINE HYDROCHLORIDE 100 MG: 20 INJECTION, SOLUTION EPIDURAL; INFILTRATION; INTRACAUDAL; PERINEURAL at 09:40

## 2021-06-29 RX ADMIN — FAMOTIDINE 20 MG: 20 TABLET, FILM COATED ORAL at 09:11

## 2021-06-29 NOTE — ANESTHESIA PREPROCEDURE EVALUATION
Relevant Problems   NEUROLOGY   (+) Severe single current episode of major depressive disorder, without psychotic features ([facility] [address]      CARDIOVASCULAR   (+) Coronary artery disease involving native coronary artery of native heart without angina pectoris   (+) Essential hypertension with goal blood pressure less than 140/90   (+) Second degree AV block   (+) Second degree AV block, Mobitz type I      RENAL FAILURE   (+) Microalbuminuria due to type 2 diabetes mellitus (HCC)      ENDOCRINE   (+) Mild nonproliferative diabetic retinopathy of both eyes (HCC)   (+) Type 2 diabetes mellitus with diabetic neuropathy (HCC)   (+) Type 2 diabetes mellitus with peripheral neuropathy (HCC)       Anesthetic History   No history of anesthetic complications            Review of Systems / Medical History  Patient summary reviewed and pertinent labs reviewed    Pulmonary  Within defined limits                 Neuro/Psych         Psychiatric history  Pertinent negatives: CVA: depression.    Cardiovascular    Hypertension: well controlled        Dysrhythmias (Mobitz type 1 block)       Exercise tolerance: >4 METS     GI/Hepatic/Renal                Endo/Other    Diabetes: well controlled, type 2    Arthritis     Other Findings              Physical Exam    Airway  Mallampati: II  TM Distance: 4 - 6 cm  Neck ROM: normal range of motion   Mouth opening: Normal     Cardiovascular  Regular rate and rhythm,  S1 and S2 normal,  no murmur, click, rub, or gallop             Dental  No notable dental hx       Pulmonary  Breath sounds clear to auscultation               Abdominal  GI exam deferred       Other Findings            Anesthetic Plan    ASA: 2  Anesthesia type: general          Induction: Intravenous  Anesthetic plan and risks discussed with: Patient

## 2021-06-29 NOTE — DISCHARGE INSTRUCTIONS
Ice and Elevate operative wound/dressing. Begin moving fingers and elbow immediately after surgery. Keep dressing clean and dry. Cover for showering. Remove dressing on Friday, wash and dry, and leave open to air. DISCHARGE SUMMARY from Nurse    PATIENT INSTRUCTIONS:    After general anesthesia or intravenous sedation, for 24 hours or while taking prescription Narcotics:  · Limit your activities  · Do not drive and operate hazardous machinery  · Do not make important personal or business decisions  · Do  not drink alcoholic beverages  · If you have not urinated within 8 hours after discharge, please contact your surgeon on call.     Report the following to your surgeon:  · Excessive pain, swelling, redness or odor of or around the surgical area  · Temperature over 100.5  · Nausea and vomiting lasting longer than 4 hours or if unable to take medications  · Any signs of decreased circulation or nerve impairment to extremity: change in color, persistent  numbness, tingling, coldness or increase pain  · Any questions

## 2021-06-29 NOTE — H&P
Adrian Cadena is a 60 y.o. male right handed unspecified employment. Worker's Compensation and legal considerations: none filed.         Vitals:     06/16/21 1057   Pulse: 67   Resp: 15   SpO2: 98%   Weight: 199 lb (90.3 kg)   Height: 5' 11\" (1.803 m)   PainSc:   0 - No pain   PainLoc: Elbow                    Chief Complaint   Patient presents with    Elbow Pain       right elbow         HPI: Patient returns today to set up tunnel release as well as follow-up for his right cubital tunnel release 4 weeks status post release. At his last visit we had started him on Bactrim due to significant redness around the incision likely due to a reaction to the suture. He says it has significantly resolved though there is still some swelling in the elbow as well as some redness. He says it is much better.     2wk HPI: Patient returns today for his first postop visit 2 weeks status post right cubital tunnel release. He reports his wound opening up proximally a few days ago. He also reports some redness around the wound.     Preop right HPI: Patient presents today to schedule right cubital tunnel release preferably for next week. We have held a spot for him for this.     2/2021 HPI: Patient presents today for follow-up of bilateral upper extremity EMGs. He reports no change since his last visit.   He reports a recent hemoglobin A1c of 8.3.     Initial HPI: Patient comes in today with complaints of bilateral hand weakness and numbness and tingling in the little finger and ring finger.     Date of onset: July 2020     Injury: No     Prior Treatment:  Yes: Comment: Right cubital tunnel release     Numbness/ Tingling: Yes: Comment: Bilateral ring and little finger        ROS: Review of Systems - General ROS: negative  Psychological ROS: negative  ENT ROS: negative  Allergy and Immunology ROS: negative  Hematological and Lymphatic ROS: negative  Respiratory ROS: no cough, shortness of breath, or wheezing  Cardiovascular ROS: no chest pain or dyspnea on exertion  Gastrointestinal ROS: no abdominal pain, change in bowel habits, or black or bloody stools  Musculoskeletal ROS: positive for - pain in hand - bilateral  Neurological ROS: positive for - numbness/tingling and weakness  Dermatological ROS: negative          Past Medical History:   Diagnosis Date    Abnormal nuclear cardiac imaging test 01/31/2012     Mod inferior infarction w/mild-mod simona-infarct ischemia. LVE. EF 43%. Marked basal inferior hypk; otherwise mild-mod inferior, inferolateral, distal anteroapical hypk. TID index 1.07. Pos max EST suggests subtotal obstruction of RCA.  Arthritis      Calculus of kidney      Chronic pain      Coronary artery disease       Inferior MI in 1998 status post RCA stent; ISR in 1999 status post rotational atherectomy. Posterior wall MI 2002 with circumflex stent.  Depression      Dyslipidemia      History of echocardiogram 10/09/2012     EF 50-55%. No WMA. Gr 1 DDfx. RVSP normal.  LAE.  IVCE.  Hyperlipidemia      Hypertension       mild    Long term current use of anticoagulant therapy      Neuropathy      Non-insulin dependent diabetes mellitus      Renal duplex 12/02/2013     No significant RA stenosis. Patent bilateral renal veins w/o thrombosis.  S/P attempted coronary angioplasty 10/08/2012     Unsuccessful angioplasty of chronic RCA occlusion w/collaterals.  S/P cardiac cath 02/17/2012     %. ISR. LM patent. LAD patent. pD1 55%. mCX patent. OM1 80% (3 x 30 Burton stent o/lapped w/3 x 12 Burton stent; residual 0%). LVEDP 13. EF 50-55%.       Second degree AV block, Mobitz type I           Surgical History         Past Surgical History:   Procedure Laterality Date    COLONOSCOPY N/A 4/24/2019     COLONOSCOPY performed by Florencia Russo [facility] at [facility] ENDOSCOPY    HX APPENDECTOMY        HX ORTHOPAEDIC   1984     Fx C4-5    HX UROLOGICAL Right       kidney stone    HX UROLOGICAL Bilateral       orchiopexy for torsion    [facility] CARDIAC SURG PROCEDURE UNLIST         stents x3    UPPER ARM/ELBOW SURGERY UNLISTED Right                     Current Outpatient Medications   Medication Sig Dispense Refill    rOPINIRole (REQUIP) 0.5 mg tablet Take 0.5 mg by mouth once over twenty-four (24) hours.        TRULICITY 7.97 SS/5.2 mL sub-q pen 0.5 ML BY SUBCUTANEOUS ROUTE EVERY SEVEN (7) DAYS. 6 Pen 3    Insulin Needles, Disposable, (SHADE PEN NEEDLE) 32 gauge x 5/32\" ndle 1 Units by Does Not Apply route two (2) times a day. 200 Pen Needle 4    sertraline (ZOLOFT) 50 mg tablet TAKE 1 TAB BY MOUTH DAILY. APPOINTMENT NEEDED TO ESTABLISH CARE 30 Tab 2    hydroCHLOROthiazide (HYDRODIURIL) 25 mg tablet Take 1 Tab by mouth daily. Appointment needed to establish care  Indications: high blood pressure 30 Tab 2    flash glucose scanning reader (FREESTYLE BENSON 14 DAY READER) misc 1 Kit by Does Not Apply route three (3) times daily. E11.9 4 Each 2    flash glucose sensor (FREESTYLE BENSON 14 DAY SENSOR) kit Take BG reading TID (E11.9) 5 Kit 1    insulin glargine (LANTUS,BASAGLAR) 100 unit/mL (3 mL) inpn Take 10 units nightly 5 Adjustable Dose Pre-filled Pen Syringe 3    metFORMIN (GLUCOPHAGE) 1,000 mg tablet Take 0.5 Tabs by mouth two (2) times daily (with meals). Indications: type 2 diabetes mellitus 180 Tab 1    atorvastatin (LIPITOR) 40 mg tablet Take 1 Tab by mouth daily. 90 Tab 3    amLODIPine (NORVASC) 10 mg tablet TAKE 1 TABLET DAILY 90 Tab 1    ramipril (ALTACE) 10 mg capsule Take 1 Cap by mouth daily. Indications: high blood pressure 90 Cap 1    traZODone (DESYREL) 100 mg tablet TAKE 1 TAB BY MOUTH NIGHTLY. APPOINTMENT NEEDED TO ESTABLISH CARE 90 Tab 1    fenofibrate nanocrystallized (TRICOR) 145 mg tablet TAKE 1 TABLET DAILY 90 Tab 1    Blood-Glucose Meter monitoring kit Use once daily as directed.  1 Kit 0    glucose blood VI test strips (BLOOD GLUCOSE TEST) strip Use daily as directed - must match his glucometer. 100 Strip 3    Lancets misc Use daily as directed 100 Each 3    MULTIVITAMIN PO Take 1 tablet by mouth every morning.        gabapentin (NEURONTIN) 800 mg tablet TAKE 1 TAB BY MOUTH 3 TIMES A DAY (Patient not taking: Reported on 6/2/2021) 90 Tab 0         No Known Allergies           PE:      Physical Exam  Vitals and nursing note reviewed. Constitutional:       General: He is not in acute distress. Appearance: Normal appearance. He is not ill-appearing, toxic-appearing or diaphoretic. HENT:      Head: Normocephalic and atraumatic. Nose: Nose normal.      Mouth/Throat:      Mouth: Mucous membranes are moist.   Eyes:      Extraocular Movements: Extraocular movements intact. Pupils: Pupils are equal, round, and reactive to light. Cardiovascular:      Pulses: Normal pulses. Pulmonary:      Effort: Pulmonary effort is normal. No respiratory distress. Abdominal:      General: Abdomen is flat. There is no distension. Musculoskeletal:         General: No swelling, tenderness, deformity or signs of injury. Cervical back: Normal range of motion and neck supple. Right lower leg: No edema. Left lower leg: No edema. Skin:     General: Skin is warm and dry. Capillary Refill: Capillary refill takes less than 2 seconds. Findings: No bruising or erythema. Neurological:      Mental Status: He is alert and oriented to person, place, and time. Cranial Nerves: No cranial nerve deficit. Sensory: No sensory deficit. Motor: Weakness present. Psychiatric:         Mood and Affect: Mood normal.         Behavior: Behavior normal.            Right elbow: Cellulitis is much improved if not completely resolved. There is some residual erythema around the incision. There is no tenderness to palpation there is some residual edema around the elbow.   Sensation grossly intact distally and cap refill brisk.        NEUROVASCULAR: There is severe first webspace wasting and intrinsic atrophy. Patient is unable to abduct or adduct his digits.     Examination L R Examination L R   Carpal Comp. - - Pronator Comp. - -   Carpal Tinel - - Pronator Tinel - -   Phalen's - - Pronator Stress - -   Cubital Comp. +   Guyon Comp. - -   Cubital Tinel +   Guyon Tinel - -   Elbow Hyperflexion +   Adson's - -   Spurling's - - SC Comp. - -   PCB Median abn - - SC Tinel - -   Radial Tinel - - IC Comp. - -   Digital Tinel - - IC Tinel - -   Radial 2-Pt WNL WNL Ulnar 2-Pt WNL WNL      Radial Pulse: 2+  Capillary Refill: < 2 sec  Иван: Not Performed  Digital Иван: Not Performed        NCV & EMG Findings:  Evaluation of the left median motor nerve showed prolonged distal onset latency (4.4 ms).  The right median motor nerve showed prolonged distal onset latency (5.3 ms) and decreased conduction velocity (Elbow-Wrist, 45 m/s).  The left ulnar motor and the right ulnar motor nerves showed prolonged distal onset latency (L5.5, R4.3 ms), reduced amplitude (L0.9, R0.4 mV), decreased conduction velocity (B Elbow-Wrist, L31, R34 m/s), and decreased conduction velocity (A Elbow-B Elbow, L20, R30 m/s).  The left median sensory and the right median sensory nerves showed prolonged distal peak latency (L3.8, R4.5 ms), reduced amplitude (L4.2, R1.9 µV), and decreased conduction velocity (Wrist-2nd Digit, L37, R31 m/s).  The left ulnar sensory nerve showed no response (Wrist) and no response (B Elbow).  The right ulnar sensory nerve showed no response (Wrist).  All remaining nerves (as indicated in the following tables) were within normal limits.  Left vs. Right side comparison data for the median motor nerve indicates abnormal L-R latency difference (0.9 ms).  The ulnar motor nerve indicates abnormal L-R latency difference (1.2 ms) and abnormal L-R amplitude difference (55.6 %).  The median sensory nerve indicates abnormal L-R latency difference (0.7 ms).        Needle evaluation of the right first dorsal interosseous muscle showed increased insertional activity, increased spontaneous activity, diminished recruitment, and moderately decreased interference pattern.  The left first dorsal interosseous muscle showed increased insertional activity and moderately increased spontaneous activity.  The left flexor carpi ulnaris muscle showed increased motor unit amplitude.  All remaining muscles (as indicated in the following table) showed no evidence of electrical instability.       INTERPRETATION  This is an abnormal electrodiagnostic examination. These findings may be consistent with:   1. Length dependent sensorimotor peripheral neuropathy preferentially affecting the ulnar nerve bilaterally. However, an entrapment neuropathy cannot be excluded - this is based on abnormalities throughout the NCS not attributable to entrapment.       CLINICAL INTERPRETATION  His electrodiagnostic findings most consistent with severe peripheral neuropathy are consistent with his symptoms.         Imaging:      None indicated            ICD-10-CM ICD-9-CM     1. Cubital tunnel syndrome, left  G56.22 354.2 SCHEDULE SURGERY   2. Cubital tunnel syndrome, right  G56.21 354. 2     3. Wound infection after surgery  T81.49XA 998.59     4. S/P cubital tunnel release  Z98.890 V45.89              Plan:      Schedule left cubital tunnel release and continue to observe healing of right side.   At this point for his neck surgery we will use a different closure including monofilament suture and Dermabond.        Follow-up and Dispositions    · Return for 2 weeks postop.            Plan was reviewed with patient, who verbalized agreement and understanding of the plan

## 2021-06-29 NOTE — BRIEF OP NOTE
Brief Postoperative Note    Patient: Panda Lynne  YOB: 1965  MRN: [id number]    Date of Procedure: 6/29/2021     Pre-Op Diagnosis: Cubital tunnel syndrome on left [G56.22]    Post-Op Diagnosis: Same as preoperative diagnosis. Procedure(s):  LEFT CUBITAL TUNNEL RELEASE [id number]    Surgeon(s):   Jo Ann Mata DO    Surgical Assistant: Surg Asst-1: Bárbara Haney    Anesthesia: General     Estimated Blood Loss (mL): less than 50     Complications: None    Specimens: * No specimens in log *     Implants: * No implants in log *    Drains: * No LDAs found *    Findings: scarred ulnar nerve at elbow    Electronically Signed by Kam Horta DO on 6/29/2021 at 10:11 AM

## 2021-06-29 NOTE — H&P
Update History & Physical    The Patient's History and Physical of June 16, 2021 was reviewed with the patient and I examined the patient. There was no change. The surgical site was confirmed by the patient and me. Plan:  The risk, benefits, expected outcome, and alternative to the recommended procedure have been discussed with the patient. Patient understands and wants to proceed with the procedure.     Electronically signed by Ryan Ly DO on 6/29/2021 at 8:39 AM

## 2021-06-29 NOTE — OP NOTES
Operative Report    Patient: Philip Perea MRN: [id number]  SSN: [id number]    YOB: 1965  Age: 60 y.o. Sex: male       Date of Surgery: 6/29/2021     Preoperative Diagnosis: Cubital tunnel syndrome on left [G56.22]     Postoperative Diagnosis: Cubital tunnel syndrome on left [G56.22]     Surgeon(s) and Role:     Jessy Sandy,  - Primary    Assistant: Rossana Ellis    Anesthesia: General + Local    Procedure: Procedure(s):  LEFT CUBITAL TUNNEL RELEASE [id number]    Findings: Scarred ulnar nerve at elbow    Procedure in Detail:     Indications for procedure been outlined in the perioperative documentation, most notably being not amenable to conservative treatment. Informed consent was obtained from the patient. The risks and benefits of the procedure were discussed with the patient. They include but are not limited to neurovascular injury, tendon/ligamentous injury, blood loss, infection, need for further surgery, hematoma, neuroma, seroma, chronic pain, chronic stiffness, complications from anesthesia including death, and the possibility of arminda Covid. After informed consent was obtained, the patient was taken back to the operative suite. A sterile tourniquet was applied to the operative extremity after it was prepped and draped in the normal sterile fashion. The arm was exsanguinated and the tourniquet was elevated to 250 mmHg. Attention was turned to the elbow where a medial curvilinear incision was made USP between the medial epicondyle and the olecranon. The subcutaneous tissues were dissected and electrocautery was used for hemostasis. The ulnar nerve was identified at the level of the medial epicondyle. At this point Trinidad's ligament was incised and attention was first turned distally where the FCU aponeurosis was incised. A limited neurolysis of the ulnar nerve was undertaken and the ulnar nerve was found to be completely released up to the mid forearm. Attention was then turned again to the medial epicondyle with the remainder of Trinidad's ligament and Trinidad's fascia was incised. Any remaining scar tissue or constrictive bands were released about the ulnar nerve up to the level of the sterile tourniquet. Again a limited neurolysis was also undertaken proximally. The elbow was run through range of motion and the ulnar nerve was not found to be subluxing or perching on the medial epicondyle. The ulnar nerve was found to be completely released throughout the cubital tunnel. The wound was copiously irrigated and 20 mL of quarter percent Marcaine plain was injected into the subcutaneous tissues as well as the periosteum surrounding the ulnar nerve. The wound was closed with 3-0 Monocryl followed by 4-0 monocryl and skin glue at the end of the procedure. The operative field was cleansed and dried and placed into a sterile dressing, and splint if indicated. The patient was sent to recovery in stable condition and given appropriate wound care instructions, follow-up with me in the outpatient setting, and a prescription for pain medicine. Estimated Blood Loss:  25 mL    Tourniquet Time:   Total Tourniquet Time Documented:  Upper Arm (Left) - 11 minutes  Total: Upper Arm (Left) - 11 minutes        Implants: * No implants in log *            Specimens: * No specimens in log *        Drains: None                Complications: None    Counts: Sponge and needle counts were correct times two.     Signed By:  Gricelda Chicas DO     June 29, 2021

## 2021-06-30 NOTE — ANESTHESIA POSTPROCEDURE EVALUATION
Procedure(s):  LEFT CUBITAL TUNNEL RELEASE.    general    Anesthesia Post Evaluation      Multimodal analgesia: multimodal analgesia used between 6 hours prior to anesthesia start to PACU discharge  Patient location during evaluation: PACU  Patient participation: complete - patient participated  Level of consciousness: awake  Pain management: adequate  Airway patency: patent  Anesthetic complications: no  Cardiovascular status: acceptable  Respiratory status: acceptable  Hydration status: acceptable  Post anesthesia nausea and vomiting:  none  Final Post Anesthesia Temperature Assessment:  Normothermia (36.0-37.5 degrees C)      INITIAL Post-op Vital signs:   Vitals Value Taken Time   /77 06/29/21 1131   Temp 36.4 °C (97.6 °F) 06/29/21 1031   Pulse 58 06/29/21 1141   Resp 0 06/29/21 1109   SpO2 98 % 06/29/21 1134   Vitals shown include unvalidated device data.

## 2021-07-07 ENCOUNTER — OFFICE VISIT (OUTPATIENT)
Dept: ORTHOPEDIC SURGERY | Age: 56
End: 2021-07-07
Payer: COMMERCIAL

## 2021-07-07 VITALS
HEART RATE: 71 BPM | TEMPERATURE: 96.8 F | OXYGEN SATURATION: 98 % | WEIGHT: 200 LBS | BODY MASS INDEX: 28 KG/M2 | HEIGHT: 71 IN

## 2021-07-07 DIAGNOSIS — Z98.890 S/P CUBITAL TUNNEL RELEASE: ICD-10-CM

## 2021-07-07 DIAGNOSIS — G56.22 CUBITAL TUNNEL SYNDROME, LEFT: Primary | ICD-10-CM

## 2021-07-07 DIAGNOSIS — M70.21 OLECRANON BURSITIS, RIGHT ELBOW: ICD-10-CM

## 2021-07-07 DIAGNOSIS — G56.21 CUBITAL TUNNEL SYNDROME, RIGHT: ICD-10-CM

## 2021-07-07 PROCEDURE — 99024 POSTOP FOLLOW-UP VISIT: CPT | Performed by: ORTHOPAEDIC SURGERY

## 2021-07-07 RX ORDER — GUAIFENESIN 100 MG/5ML
81 LIQUID (ML) ORAL DAILY
COMMUNITY

## 2021-07-08 NOTE — PROGRESS NOTES
Lacey Guadalupe is a 60 y.o. male right handed unspecified employment. Worker's Compensation and legal considerations: none filed. Vitals:    07/07/21 1557   Pulse: 71   Temp: 96.8 °F (36 °C)   TempSrc: Temporal   SpO2: 98%   Weight: 200 lb (90.7 kg)   Height: 5' 11\" (1.803 m)   PainSc:   1   PainLoc: Elbow           Chief Complaint   Patient presents with    Elbow Pain     left elbow       HPI: Patient presents today 2 weeks status post left cubital tunnel release and nearly 2 months status post right cubital tunnel release. He reports no issues with the left side with some continued numbness but improved. He reports some continued edema around the right elbow. This is not painful. 4-week right/left H&P HPI: Patient returns today to set up tunnel release as well as follow-up for his right cubital tunnel release 4 weeks status post release. At his last visit we had started him on Bactrim due to significant redness around the incision likely due to a reaction to the suture. He says it has significantly resolved though there is still some swelling in the elbow as well as some redness. He says it is much better. 2wk HPI: Patient returns today for his first postop visit 2 weeks status post right cubital tunnel release. He reports his wound opening up proximally a few days ago. He also reports some redness around the wound. Preop right HPI: Patient presents today to schedule right cubital tunnel release preferably for next week. We have held a spot for him for this. 2/2021 HPI: Patient presents today for follow-up of bilateral upper extremity EMGs. He reports no change since his last visit. He reports a recent hemoglobin A1c of 8.3. Initial HPI: Patient comes in today with complaints of bilateral hand weakness and numbness and tingling in the little finger and ring finger.     Date of onset: July 2020    Injury: No    Prior Treatment:  Yes: Comment: Bilateral cubital tunnel release    Numbness/ Tingling: Yes: Comment: Bilateral ring and little finger      ROS: Review of Systems - General ROS: negative  Psychological ROS: negative  ENT ROS: negative  Allergy and Immunology ROS: negative  Hematological and Lymphatic ROS: negative  Respiratory ROS: no cough, shortness of breath, or wheezing  Cardiovascular ROS: no chest pain or dyspnea on exertion  Gastrointestinal ROS: no abdominal pain, change in bowel habits, or black or bloody stools  Musculoskeletal ROS: positive for - pain in hand - bilateral  Neurological ROS: positive for - numbness/tingling and weakness  Dermatological ROS: negative    Past Medical History:   Diagnosis Date    Abnormal nuclear cardiac imaging test 01/31/2012    Mod inferior infarction w/mild-mod simona-infarct ischemia. LVE. EF 43%. Marked basal inferior hypk; otherwise mild-mod inferior, inferolateral, distal anteroapical hypk. TID index 1.07. Pos max EST suggests subtotal obstruction of RCA.  Arthritis     Calculus of kidney     Chronic pain     Coronary artery disease     Inferior MI in 1998 status post RCA stent; ISR in 1999 status post rotational atherectomy. Posterior wall MI 2002 with circumflex stent.  Depression     Dyslipidemia     History of echocardiogram 10/09/2012    EF 50-55%. No WMA. Gr 1 DDfx. RVSP normal.  LAE.  IVCE.  Hyperlipidemia     Hypertension     mild    Long term current use of anticoagulant therapy     Neuropathy     Non-insulin dependent diabetes mellitus     Renal duplex 12/02/2013    No significant RA stenosis. Patent bilateral renal veins w/o thrombosis.  S/P attempted coronary angioplasty 10/08/2012    Unsuccessful angioplasty of chronic RCA occlusion w/collaterals.  S/P cardiac cath 02/17/2012    %. ISR. LM patent. LAD patent. pD1 55%. mCX patent. OM1 80% (3 x 30 [place] stent o/lapped w/3 x 12 [place] stent; residual 0%). LVEDP 13. EF 50-55%.       Second degree AV block, Mobitz type I     Chronic/recurrent       Past Surgical History:   Procedure Laterality Date    COLONOSCOPY N/A 4/24/2019    COLONOSCOPY performed by Jt Sagastume [facility] at [facility] ENDOSCOPY    HX APPENDECTOMY      HX ORTHOPAEDIC  1984    Fx C4-5    HX UROLOGICAL Right     kidney stone    HX UROLOGICAL Bilateral     orchiopexy for torsion    MT CARDIAC SURG PROCEDURE UNLIST      stents x3    UPPER ARM/ELBOW SURGERY UNLISTED Right     UPPER ARM/ELBOW SURGERY UNLISTED Left        Current Outpatient Medications   Medication Sig Dispense Refill    aspirin 81 mg chewable tablet Take 81 mg by mouth daily.  rOPINIRole (REQUIP) 0.5 mg tablet Take 0.5 mg by mouth once over twenty-four (24) hours.  TRULICITY 5.16 LS/9.4 mL sub-q pen 0.5 ML BY SUBCUTANEOUS ROUTE EVERY SEVEN (7) DAYS. 6 Pen 3    Insulin Needles, Disposable, (SHADE PEN NEEDLE) 32 gauge x 5/32\" ndle 1 Units by Does Not Apply route two (2) times a day. 200 Pen Needle 4    sertraline (ZOLOFT) 50 mg tablet TAKE 1 TAB BY MOUTH DAILY. APPOINTMENT NEEDED TO ESTABLISH CARE 30 Tab 2    hydroCHLOROthiazide (HYDRODIURIL) 25 mg tablet Take 1 Tab by mouth daily. Appointment needed to establish care  Indications: high blood pressure 30 Tab 2    flash glucose scanning reader (FREESTYLE BENSON 14 DAY READER) misc 1 Kit by Does Not Apply route three (3) times daily. E11.9 4 Each 2    flash glucose sensor (FREESTYLE BENSON 14 DAY SENSOR) kit Take BG reading TID (E11.9) 5 Kit 1    insulin glargine (LANTUS,BASAGLAR) 100 unit/mL (3 mL) inpn Take 10 units nightly (Patient taking differently: 25 Units daily. Take 10 units nightly) 5 Adjustable Dose Pre-filled Pen Syringe 3    atorvastatin (LIPITOR) 40 mg tablet Take 1 Tab by mouth daily. 90 Tab 3    amLODIPine (NORVASC) 10 mg tablet TAKE 1 TABLET DAILY 90 Tab 1    ramipril (ALTACE) 10 mg capsule Take 1 Cap by mouth daily.  Indications: high blood pressure 90 Cap 1    traZODone (DESYREL) 100 mg tablet TAKE 1 TAB BY MOUTH NIGHTLY. APPOINTMENT NEEDED TO ESTABLISH CARE 90 Tab 1    fenofibrate nanocrystallized (TRICOR) 145 mg tablet TAKE 1 TABLET DAILY 90 Tab 1    Blood-Glucose Meter monitoring kit Use once daily as directed. 1 Kit 0    glucose blood VI test strips (BLOOD GLUCOSE TEST) strip Use daily as directed - must match his glucometer. 100 Strip 3    Lancets misc Use daily as directed 100 Each 3    MULTIVITAMIN PO Take 1 tablet by mouth every morning.  gabapentin (NEURONTIN) 800 mg tablet TAKE 1 TAB BY MOUTH 3 TIMES A DAY (Patient not taking: Reported on 6/2/2021) 90 Tab 0    metFORMIN (GLUCOPHAGE) 1,000 mg tablet Take 0.5 Tabs by mouth two (2) times daily (with meals). Indications: type 2 diabetes mellitus (Patient not taking: Reported on 6/25/2021) 180 Tab 1       Allergies   Allergen Reactions    Other Plant, Animal, Environmental Other (comments)     Surgical suture- redness           PE:     Physical Exam  Vitals and nursing note reviewed. Constitutional:       General: He is not in acute distress. Appearance: Normal appearance. He is not ill-appearing, toxic-appearing or diaphoretic. Cardiovascular:      Pulses: Normal pulses. Pulmonary:      Effort: Pulmonary effort is normal. No respiratory distress. Abdominal:      General: Abdomen is flat. There is no distension. Musculoskeletal:         General: No swelling, tenderness, deformity or signs of injury. Cervical back: Normal range of motion and neck supple. Right lower leg: No edema. Left lower leg: No edema. Skin:     General: Skin is warm and dry. Capillary Refill: Capillary refill takes less than 2 seconds. Findings: No bruising or erythema. Neurological:      Mental Status: He is alert and oriented to person, place, and time. Cranial Nerves: No cranial nerve deficit. Sensory: No sensory deficit.    Psychiatric:         Mood and Affect: Mood normal.         Behavior: Behavior normal.          Left elbow: Incision clean dry and intact no drainage breakdown erythema or any signs of infection. Right elbow: There is continued edema about the olecranon. This is nontender. Range of motion is full. Sensation grossly intact but still some continued paresthesia. NCV & EMG Findings:  Evaluation of the left median motor nerve showed prolonged distal onset latency (4.4 ms). The right median motor nerve showed prolonged distal onset latency (5.3 ms) and decreased conduction velocity (Elbow-Wrist, 45 m/s). The left ulnar motor and the right ulnar motor nerves showed prolonged distal onset latency (L5.5, R4.3 ms), reduced amplitude (L0.9, R0.4 mV), decreased conduction velocity (B Elbow-Wrist, L31, R34 m/s), and decreased conduction velocity (A Elbow-B Elbow, L20, R30 m/s). The left median sensory and the right median sensory nerves showed prolonged distal peak latency (L3.8, R4.5 ms), reduced amplitude (L4.2, R1.9 µV), and decreased conduction velocity (Wrist-2nd Digit, L37, R31 m/s). The left ulnar sensory nerve showed no response (Wrist) and no response (B Elbow). The right ulnar sensory nerve showed no response (Wrist). All remaining nerves (as indicated in the following tables) were within normal limits. Left vs. Right side comparison data for the median motor nerve indicates abnormal L-R latency difference (0.9 ms). The ulnar motor nerve indicates abnormal L-R latency difference (1.2 ms) and abnormal L-R amplitude difference (55.6 %). The median sensory nerve indicates abnormal L-R latency difference (0.7 ms).       Needle evaluation of the right first dorsal interosseous muscle showed increased insertional activity, increased spontaneous activity, diminished recruitment, and moderately decreased interference pattern. The left first dorsal interosseous muscle showed increased insertional activity and moderately increased spontaneous activity.   The left flexor carpi ulnaris muscle showed increased motor unit amplitude. All remaining muscles (as indicated in the following table) showed no evidence of electrical instability.       INTERPRETATION  This is an abnormal electrodiagnostic examination. These findings may be consistent with:   1. Length dependent sensorimotor peripheral neuropathy preferentially affecting the ulnar nerve bilaterally. However, an entrapment neuropathy cannot be excluded - this is based on abnormalities throughout the NCS not attributable to entrapment.       CLINICAL INTERPRETATION  His electrodiagnostic findings most consistent with severe peripheral neuropathy are consistent with his symptoms. Imaging:     None indicated        ICD-10-CM ICD-9-CM    1. Cubital tunnel syndrome, left  G56.22 354.2    2. Cubital tunnel syndrome, right  G56.21 354.2    3. S/P cubital tunnel release  Z98.890 V45.89          Plan:     Discussed range of motion exercises and edema control. Follow-up and Dispositions    · Return if symptoms worsen or fail to improve.           Plan was reviewed with patient, who verbalized agreement and understanding of the plan

## 2021-07-28 ENCOUNTER — TELEPHONE (OUTPATIENT)
Dept: ORTHOPEDIC SURGERY | Age: 56
End: 2021-07-28

## 2021-08-05 ENCOUNTER — TELEPHONE (OUTPATIENT)
Dept: ORTHOPEDIC SURGERY | Age: 56
End: 2021-08-05

## 2021-08-05 NOTE — TELEPHONE ENCOUNTER
Spoke to the patient. He would like to stay out until 8/16/2021, Dr. Trevon Rangel is fine with this date. Patient requested that we fax the letter to [facility]. Created letter and faxed the letter to [facility] at [facility] at [phone]. Confirmation received. Transferred the patient to the [facility] to schedule follow up per Dr. Trevon Rangel.

## 2021-08-05 NOTE — TELEPHONE ENCOUNTER
Please find out when he thinks he will be able to return to work, and give him a letter for that date. Also have him schedule a follow-up if he has not already.

## 2021-08-05 NOTE — LETTER
NOTIFICATION RETURN TO WORK / SCHOOL    8/5/2021 2:05 PM    Mr. Artem Quinn  [address], [address]  [place] [id number]      To Whom It May Concern:    Artem Quinn is currently under the care of [facility]. He will return to work on August 16, 2021    If there are questions or concerns please have the patient contact our office.         Sincerely,      Irene Liu, DO

## 2021-08-05 NOTE — TELEPHONE ENCOUNTER
Patient called to request call back in regards to return to work note. Indicates he is not prepared to return to work and provider is aware. [employer] called him and advised they show a return to work as 7/26/21.      Requesting call back @[phone]

## 2021-08-10 ENCOUNTER — DOCUMENTATION ONLY (OUTPATIENT)
Dept: ORTHOPEDIC SURGERY | Age: 56
End: 2021-08-10

## 2021-08-11 ENCOUNTER — OFFICE VISIT (OUTPATIENT)
Dept: ORTHOPEDIC SURGERY | Age: 56
End: 2021-08-11
Payer: COMMERCIAL

## 2021-08-11 VITALS
HEIGHT: 71 IN | BODY MASS INDEX: 27.61 KG/M2 | WEIGHT: 197.2 LBS | TEMPERATURE: 97.5 F | OXYGEN SATURATION: 98 % | HEART RATE: 75 BPM | RESPIRATION RATE: 15 BRPM

## 2021-08-11 DIAGNOSIS — L03.114 CELLULITIS OF LEFT UPPER EXTREMITY: Primary | ICD-10-CM

## 2021-08-11 DIAGNOSIS — G56.21 CUBITAL TUNNEL SYNDROME, RIGHT: ICD-10-CM

## 2021-08-11 DIAGNOSIS — Z98.890 S/P CUBITAL TUNNEL RELEASE: ICD-10-CM

## 2021-08-11 DIAGNOSIS — G56.22 CUBITAL TUNNEL SYNDROME, LEFT: ICD-10-CM

## 2021-08-11 PROCEDURE — 99024 POSTOP FOLLOW-UP VISIT: CPT | Performed by: ORTHOPAEDIC SURGERY

## 2021-08-11 NOTE — PROGRESS NOTES
Tania Salazar is a 60 y.o. male right handed unspecified employment. Worker's Compensation and legal considerations: none filed. Vitals:    08/11/21 1114   Pulse: 75   Resp: 15   Temp: 97.5 °F (36.4 °C)   SpO2: 98%   Weight: 197 lb 3.2 oz (89.4 kg)   Height: 5' 11\" (1.803 m)   PainSc:   0 - No pain   PainLoc: Elbow           Chief Complaint   Patient presents with    Elbow Pain     left       HPI: Patient presents today 6 weeks status post left cubital tunnel release in approximately 3 months status post right cubital tunnel release. He was recently on antibiotics due to a cellulitis that developed around his incision. He reports to be improving. He reports minimal pain and some mild improvement in the numbness. 2wk HPI: Patient presents today 2 weeks status post left cubital tunnel release and nearly 2 months status post right cubital tunnel release. He reports no issues with the left side with some continued numbness but improved. He reports some continued edema around the right elbow. This is not painful. 4-week right/left H&P HPI: Patient returns today to set up tunnel release as well as follow-up for his right cubital tunnel release 4 weeks status post release. At his last visit we had started him on Bactrim due to significant redness around the incision likely due to a reaction to the suture. He says it has significantly resolved though there is still some swelling in the elbow as well as some redness. He says it is much better. 2wk HPI: Patient returns today for his first postop visit 2 weeks status post right cubital tunnel release. He reports his wound opening up proximally a few days ago. He also reports some redness around the wound. Preop right HPI: Patient presents today to schedule right cubital tunnel release preferably for next week. We have held a spot for him for this. 2/2021 HPI: Patient presents today for follow-up of bilateral upper extremity EMGs.   He reports no change since his last visit. He reports a recent hemoglobin A1c of 8.3. Initial HPI: Patient comes in today with complaints of bilateral hand weakness and numbness and tingling in the little finger and ring finger. Date of onset: July 2020    Injury: No    Prior Treatment:  Yes: Comment: Bilateral cubital tunnel release    Numbness/ Tingling: Yes: Comment: Bilateral ring and little finger      ROS: Review of Systems - General ROS: negative  Psychological ROS: negative  ENT ROS: negative  Allergy and Immunology ROS: negative  Hematological and Lymphatic ROS: negative  Respiratory ROS: no cough, shortness of breath, or wheezing  Cardiovascular ROS: no chest pain or dyspnea on exertion  Gastrointestinal ROS: no abdominal pain, change in bowel habits, or black or bloody stools  Musculoskeletal ROS: positive for - pain in hand - bilateral  Neurological ROS: positive for - numbness/tingling and weakness  Dermatological ROS: negative    Past Medical History:   Diagnosis Date    Abnormal nuclear cardiac imaging test 01/31/2012    Mod inferior infarction w/mild-mod simona-infarct ischemia. LVE. EF 43%. Marked basal inferior hypk; otherwise mild-mod inferior, inferolateral, distal anteroapical hypk. TID index 1.07. Pos max EST suggests subtotal obstruction of RCA.  Arthritis     Calculus of kidney     Chronic pain     Coronary artery disease     Inferior MI in 1998 status post RCA stent; ISR in 1999 status post rotational atherectomy. Posterior wall MI 2002 with circumflex stent.  Depression     Dyslipidemia     History of echocardiogram 10/09/2012    EF 50-55%. No WMA. Gr 1 DDfx. RVSP normal.  LAE.  IVCE.  Hyperlipidemia     Hypertension     mild    Long term current use of anticoagulant therapy     Neuropathy     Non-insulin dependent diabetes mellitus     Renal duplex 12/02/2013    No significant RA stenosis. Patent bilateral renal veins w/o thrombosis.       S/P attempted coronary angioplasty 10/08/2012    Unsuccessful angioplasty of chronic RCA occlusion w/collaterals.  S/P cardiac cath 02/17/2012    %. ISR. LM patent. LAD patent. pD1 55%. mCX patent. OM1 80% (3 x 30 [place] stent o/lapped w/3 x 12 [place] stent; residual 0%). LVEDP 13. EF 50-55%.  Second degree AV block, Mobitz type I     Chronic/recurrent       Past Surgical History:   Procedure Laterality Date    COLONOSCOPY N/A 4/24/2019    COL[facility] Gardenia Chatter[facility] at [facility] ENDOSCOPY    HX APPENDECTOMY      HX [address]    Fx C4-5    HX UROLOGICAL Right     kidney stone    HX UROLOGICAL Bilateral     orchiopexy for torsion    ME CARDIAC SURG PROCEDURE UNLIST      stents x3    UPPER ARM/ELBOW SURGERY UNLISTED Right     UPPER ARM/ELBOW SURGERY UNLISTED Left        Current Outpatient Medications   Medication Sig Dispense Refill    aspirin 81 mg chewable tablet Take 81 mg by mouth daily.  rOPINIRole (REQUIP) 0.5 mg tablet Take 0.5 mg by mouth once over twenty-four (24) hours.  TRULICITY 4.80 QE/6.3 mL sub-q pen 0.5 ML BY SUBCUTANEOUS ROUTE EVERY SEVEN (7) DAYS. (Patient taking differently: 1.5 mg. Inject subcutaneously into the upper arm, thigh, or abdomen; rotate injection sites each week. May administer without regard to meals or time of day.) 6 Pen 3    Insulin Needles, Disposable, (SHADE PEN NEEDLE) 32 gauge x 5/32\" ndle 1 Units by Does Not Apply route two (2) times a day. 200 Pen Needle 4    sertraline (ZOLOFT) 50 mg tablet TAKE 1 TAB BY MOUTH DAILY. APPOINTMENT NEEDED TO ESTABLISH CARE 30 Tab 2    hydroCHLOROthiazide (HYDRODIURIL) 25 mg tablet Take 1 Tab by mouth daily. Appointment needed to establish care  Indications: high blood pressure 30 Tab 2    flash glucose scanning reader (FREESTYLE BENSON 14 DAY READER) misc 1 Kit by Does Not Apply route three (3) times daily.  E11.9 4 Each 2    flash glucose sensor (FREESTYLE BENSON 14 DAY SENSOR) kit Take BG reading TID (E11.9) 5 Kit 1    insulin glargine (LANTUS,BASAGLAR) 100 unit/mL (3 mL) inpn Take 10 units nightly (Patient taking differently: 25 Units daily. Take 10 units nightly) 5 Adjustable Dose Pre-filled Pen Syringe 3    atorvastatin (LIPITOR) 40 mg tablet Take 1 Tab by mouth daily. 90 Tab 3    amLODIPine (NORVASC) 10 mg tablet TAKE 1 TABLET DAILY 90 Tab 1    ramipril (ALTACE) 10 mg capsule Take 1 Cap by mouth daily. Indications: high blood pressure 90 Cap 1    traZODone (DESYREL) 100 mg tablet TAKE 1 TAB BY MOUTH NIGHTLY. APPOINTMENT NEEDED TO ESTABLISH CARE 90 Tab 1    fenofibrate nanocrystallized (TRICOR) 145 mg tablet TAKE 1 TABLET DAILY 90 Tab 1    Blood-Glucose Meter monitoring kit Use once daily as directed. 1 Kit 0    glucose blood VI test strips (BLOOD GLUCOSE TEST) strip Use daily as directed - must match his glucometer. 100 Strip 3    Lancets misc Use daily as directed 100 Each 3    MULTIVITAMIN PO Take 1 tablet by mouth every morning.  gabapentin (NEURONTIN) 800 mg tablet TAKE 1 TAB BY MOUTH 3 TIMES A DAY (Patient not taking: Reported on 6/2/2021) 90 Tab 0    metFORMIN (GLUCOPHAGE) 1,000 mg tablet Take 0.5 Tabs by mouth two (2) times daily (with meals). Indications: type 2 diabetes mellitus (Patient not taking: Reported on 6/25/2021) 180 Tab 1       Allergies   Allergen Reactions    Other Plant, Animal, Environmental Other (comments)     Surgical suture- redness           PE:     Physical Exam  Vitals and nursing note reviewed. Constitutional:       General: He is not in acute distress. Appearance: Normal appearance. He is not ill-appearing, toxic-appearing or diaphoretic. Cardiovascular:      Pulses: Normal pulses. Pulmonary:      Effort: Pulmonary effort is normal. No respiratory distress. Abdominal:      General: Abdomen is flat. There is no distension. Musculoskeletal:         General: No swelling, tenderness, deformity or signs of injury.       Cervical back: Normal range of motion and neck supple. Right lower leg: No edema. Left lower leg: No edema. Skin:     General: Skin is warm and dry. Capillary Refill: Capillary refill takes less than 2 seconds. Findings: No bruising or erythema. Neurological:      General: No focal deficit present. Mental Status: He is alert and oriented to person, place, and time. Cranial Nerves: No cranial nerve deficit. Sensory: No sensory deficit. Psychiatric:         Mood and Affect: Mood normal.         Behavior: Behavior normal.          Left elbow: Incision healed distally with some residual scab formation proximally and some residual erythema. There is no evidence of active infection at this time. Neurovascularly intact distally. Right elbow: Edema is improved on the right side and wound is completely healed. Neurovascularly intact. NCV & EMG Findings:  Evaluation of the left median motor nerve showed prolonged distal onset latency (4.4 ms). The right median motor nerve showed prolonged distal onset latency (5.3 ms) and decreased conduction velocity (Elbow-Wrist, 45 m/s). The left ulnar motor and the right ulnar motor nerves showed prolonged distal onset latency (L5.5, R4.3 ms), reduced amplitude (L0.9, R0.4 mV), decreased conduction velocity (B Elbow-Wrist, L31, R34 m/s), and decreased conduction velocity (A Elbow-B Elbow, L20, R30 m/s). The left median sensory and the right median sensory nerves showed prolonged distal peak latency (L3.8, R4.5 ms), reduced amplitude (L4.2, R1.9 µV), and decreased conduction velocity (Wrist-2nd Digit, L37, R31 m/s). The left ulnar sensory nerve showed no response (Wrist) and no response (B Elbow). The right ulnar sensory nerve showed no response (Wrist). All remaining nerves (as indicated in the following tables) were within normal limits. Left vs. Right side comparison data for the median motor nerve indicates abnormal L-R latency difference (0.9 ms).   The ulnar motor nerve indicates abnormal L-R latency difference (1.2 ms) and abnormal L-R amplitude difference (55.6 %). The median sensory nerve indicates abnormal L-R latency difference (0.7 ms).       Needle evaluation of the right first dorsal interosseous muscle showed increased insertional activity, increased spontaneous activity, diminished recruitment, and moderately decreased interference pattern. The left first dorsal interosseous muscle showed increased insertional activity and moderately increased spontaneous activity. The left flexor carpi ulnaris muscle showed increased motor unit amplitude. All remaining muscles (as indicated in the following table) showed no evidence of electrical instability.       INTERPRETATION  This is an abnormal electrodiagnostic examination. These findings may be consistent with:   1. Length dependent sensorimotor peripheral neuropathy preferentially affecting the ulnar nerve bilaterally. However, an entrapment neuropathy cannot be excluded - this is based on abnormalities throughout the NCS not attributable to entrapment.       CLINICAL INTERPRETATION  His electrodiagnostic findings most consistent with severe peripheral neuropathy are consistent with his symptoms. Imaging:     None indicated        ICD-10-CM ICD-9-CM    1. Cellulitis of left upper extremity  L03.114 682.3    2. Cubital tunnel syndrome, left  G56.22 354.2    3. Cubital tunnel syndrome, right  G56.21 354.2    4. S/P cubital tunnel release  Z98.890 V45.89          Plan:     Return to work next week and continue range of motion exercises. Follow-up and Dispositions    · Return if symptoms worsen or fail to improve.           Plan was reviewed with patient, who verbalized agreement and understanding of the plan

## 2021-08-23 ENCOUNTER — DOCUMENTATION ONLY (OUTPATIENT)
Dept: ORTHOPEDIC SURGERY | Age: 56
End: 2021-08-23

## 2021-08-25 ENCOUNTER — DOCUMENTATION ONLY (OUTPATIENT)
Dept: ORTHOPEDIC SURGERY | Age: 56
End: 2021-08-25

## 2021-08-25 NOTE — PROGRESS NOTES
Faxed [facility] paperwork to [facility] at [phone]. Called patient and left message letting him know it had been faxed and ready for .

## 2021-10-06 ENCOUNTER — OFFICE VISIT (OUTPATIENT)
Dept: CARDIOLOGY CLINIC | Age: 56
End: 2021-10-06
Payer: COMMERCIAL

## 2021-10-06 VITALS
HEART RATE: 69 BPM | OXYGEN SATURATION: 97 % | HEIGHT: 71 IN | BODY MASS INDEX: 27.44 KG/M2 | SYSTOLIC BLOOD PRESSURE: 134 MMHG | DIASTOLIC BLOOD PRESSURE: 86 MMHG | WEIGHT: 196 LBS

## 2021-10-06 DIAGNOSIS — I25.5 ISCHEMIC CARDIOMYOPATHY: Primary | ICD-10-CM

## 2021-10-06 PROCEDURE — 99214 OFFICE O/P EST MOD 30 MIN: CPT | Performed by: INTERNAL MEDICINE

## 2021-10-06 PROCEDURE — 93000 ELECTROCARDIOGRAM COMPLETE: CPT | Performed by: INTERNAL MEDICINE

## 2021-10-06 RX ORDER — TAPENTADOL HYDROCHLORIDE 75 MG/1
75 TABLET, FILM COATED ORAL 3 TIMES DAILY
COMMUNITY
Start: 2021-09-30

## 2021-10-06 NOTE — PROGRESS NOTES
HISTORY OF PRESENT ILLNESS  Estelle Denis is a 60 y.o. male. ASSESSMENT and PLAN    Mr. Cindi العلي has history of CAD. He has known occluded RCA noted back in 1998 with in-stent restenosis. He did Undergo rotational atherectomy in 1999 but subsequently had restenosis again. Back in October of 2012,  intervention was attempted without success. Patient was continued on medical therapy. He has significant first-degree AV block with AK interval of 300 ms, which has now progressed to second-degree type I heart block, asymptomatic. · CAD:    Clinically stable. · Heart block:   He has second-degree type I heart block without symptoms. Would continue observation. · BP:    Mildly elevated. On recheck, it was within normal limits. · CHF:    There is no evidence of decompensated CHF noted. · Weight:     His weight today is 196 pounds. His baseline weight is about 196 pounds. · Cholesterol:   Target LDL <70. Lipitor 40, TriCor 145  · Anti-platelet:   Remains on ASA. Wenkebach persists. However, he has not had any symptoms consistent with significant heart block. We discussed at length, about possible permanent pacemaker insertion if he develops irreversible symptomatic bradycardia. All questions were answered. If he develops any symptoms consistent with heart block, he will get back in touch with me. I will see him back  in 6 months. Thank you. Encounter Diagnoses   Name Primary?  Ischemic cardiomyopathy Yes     current treatment plan is effective, no change in therapy  lab results and schedule of future lab studies reviewed with patient  reviewed diet, exercise and weight control  cardiovascular risk and specific lipid/LDL goals reviewed  use of aspirin to prevent MI and TIA's discussed      HPI   Today, Mr. Maverick Mullins has no complaints of chest pains, increased shortness of breath or decreased exercise capacity. He denies any changes in his activity levels.   He still works for a [employer]. He remains active. He denies any orthopnea or PND. He denies any palpitations or dizziness. Review of Systems   Respiratory: Negative for shortness of breath. Cardiovascular: Negative for chest pain, palpitations, orthopnea, claudication, leg swelling and PND. All other systems reviewed and are negative. Physical Exam  Vitals and nursing note reviewed. Constitutional:       Appearance: Normal appearance. HENT:      Head: Normocephalic. Eyes:      Conjunctiva/sclera: Conjunctivae normal.   Neck:      Vascular: No carotid bruit. Cardiovascular:      Rate and Rhythm: Normal rate. Rhythm irregular. Pulmonary:      Breath sounds: Normal breath sounds. Abdominal:      Palpations: Abdomen is soft. Musculoskeletal:         General: No swelling. Cervical back: No rigidity. Skin:     General: Skin is warm and dry. Neurological:      General: No focal deficit present. Mental Status: He is alert and oriented to person, place, and time. Psychiatric:         Mood and Affect: Mood normal.         Behavior: Behavior normal.         PCP: Javier Camargo MD    Past Medical History:   Diagnosis Date    Abnormal nuclear cardiac imaging test 01/31/2012    Mod inferior infarction w/mild-mod simona-infarct ischemia. LVE. EF 43%. Marked basal inferior hypk; otherwise mild-mod inferior, inferolateral, distal anteroapical hypk. TID index 1.07. Pos max EST suggests subtotal obstruction of RCA.  Arthritis     Calculus of kidney     Chronic pain     Coronary artery disease     Inferior MI in 1998 status post RCA stent; ISR in 1999 status post rotational atherectomy. Posterior wall MI 2002 with circumflex stent.  Depression     Dyslipidemia     History of echocardiogram 10/09/2012    EF 50-55%. No WMA. Gr 1 DDfx. RVSP normal.  LAE.  IVCE.       Hyperlipidemia     Hypertension     mild    Long term current use of anticoagulant therapy     Neuropathy     Non-insulin dependent diabetes mellitus     Renal duplex 12/02/2013    No significant RA stenosis. Patent bilateral renal veins w/o thrombosis.  S/P attempted coronary angioplasty 10/08/2012    Unsuccessful angioplasty of chronic RCA occlusion w/collaterals.  S/P cardiac cath 02/17/2012    %. ISR. LM patent. LAD patent. pD1 55%. mCX patent. OM1 80% (3 x 30 [place] stent o/lapped w/3 x 12 [place] stent; residual 0%). LVEDP 13. EF 50-55%.  Second degree AV block, Mobitz type I     Chronic/recurrent       Past Surgical History:   Procedure Laterality Date    COLONOSCOPY N/A 4/24/2019    COLONOSCOPY performed by Eunice Mireles MD at [facility] ENDOSCOPY    HX APPENDECTOMY      HX 58 Mach[address] Scholis Chorophilakis    Fx C4-5    HX UROLOGICAL Right     kidney stone    HX UROLOGICAL Bilateral     orchiopexy for torsion    AK CARDIAC SURG PROCEDURE UNLIST      stents x3    UPPER ARM/ELBOW SURGERY UNLISTED Right     UPPER ARM/ELBOW SURGERY UNLISTED Left        Current Outpatient Medications   Medication Sig Dispense Refill    Nucynta 75 mg tablet Take 75 mg by mouth three (3) times daily.  aspirin 81 mg chewable tablet Take 81 mg by mouth daily.  rOPINIRole (REQUIP) 0.5 mg tablet Take 0.5 mg by mouth once over twenty-four (24) hours.  TRULICITY 1.97 ZG/4.7 mL sub-q pen 0.5 ML BY SUBCUTANEOUS ROUTE EVERY SEVEN (7) DAYS. (Patient taking differently: 1.5 mg. Inject subcutaneously into the upper arm, thigh, or abdomen; rotate injection sites each week. May administer without regard to meals or time of day.) 6 Pen 3    Insulin Needles, Disposable, (SHADE PEN NEEDLE) 32 gauge x 5/32\" ndle 1 Units by Does Not Apply route two (2) times a day. 200 Pen Needle 4    sertraline (ZOLOFT) 50 mg tablet TAKE 1 TAB BY MOUTH DAILY.  APPOINTMENT NEEDED TO ESTABLISH CARE 30 Tab 2    gabapentin (NEURONTIN) 800 mg tablet TAKE 1 TAB BY MOUTH 3 TIMES A DAY 90 Tab 0    hydroCHLOROthiazide (HYDRODIURIL) 25 mg tablet Take 1 Tab by mouth daily. Appointment needed to establish care  Indications: high blood pressure 30 Tab 2    flash glucose scanning reader (FREESTYLE BENSON 14 DAY READER) misc 1 Kit by Does Not Apply route three (3) times daily. E11.9 4 Each 2    flash glucose sensor (FREESTYLE BENSON 14 DAY SENSOR) kit Take BG reading TID (E11.9) 5 Kit 1    insulin glargine (LANTUS,BASAGLAR) 100 unit/mL (3 mL) inpn Take 10 units nightly (Patient taking differently: 25 Units daily. Take 10 units nightly) 5 Adjustable Dose Pre-filled Pen Syringe 3    metFORMIN (GLUCOPHAGE) 1,000 mg tablet Take 0.5 Tabs by mouth two (2) times daily (with meals). Indications: type 2 diabetes mellitus 180 Tab 1    atorvastatin (LIPITOR) 40 mg tablet Take 1 Tab by mouth daily. 90 Tab 3    amLODIPine (NORVASC) 10 mg tablet TAKE 1 TABLET DAILY 90 Tab 1    ramipril (ALTACE) 10 mg capsule Take 1 Cap by mouth daily. Indications: high blood pressure 90 Cap 1    traZODone (DESYREL) 100 mg tablet TAKE 1 TAB BY MOUTH NIGHTLY. APPOINTMENT NEEDED TO ESTABLISH CARE 90 Tab 1    fenofibrate nanocrystallized (TRICOR) 145 mg tablet TAKE 1 TABLET DAILY 90 Tab 1    Blood-Glucose Meter monitoring kit Use once daily as directed. 1 Kit 0    glucose blood VI test strips (BLOOD GLUCOSE TEST) strip Use daily as directed - must match his glucometer. 100 Strip 3    Lancets misc Use daily as directed 100 Each 3    MULTIVITAMIN PO Take 1 tablet by mouth every morning.          The patient has a family history of    Social History     Tobacco Use    Smoking status: Former Smoker     Years: 20.00     Quit date: 2007     Years since quittin.7    Smokeless tobacco: Never Used   Vaping Use    Vaping Use: Never used   Substance Use Topics    Alcohol use: No    Drug use: Not Currently     Types: Marijuana, Cocaine     Comment: during early        Lab Results   Component Value Date/Time    Cholesterol, total 161 2020 01:10 PM    HDL Cholesterol 36 (L) 03/16/2020 01:10 PM    LDL,Direct 61 02/26/2010 10:34 AM    LDL, calculated 93 03/16/2020 01:10 PM    Triglyceride 160 (H) 03/16/2020 01:10 PM    CHOL/HDL Ratio 4.5 03/16/2020 01:10 PM        BP Readings from Last 3 Encounters:   10/06/21 134/86   06/29/21 (!) 149/77   05/18/21 123/79        Pulse Readings from Last 3 Encounters:   10/06/21 69   08/11/21 75   07/07/21 71       Wt Readings from Last 3 Encounters:   10/06/21 88.9 kg (196 lb)   08/11/21 89.4 kg (197 lb 3.2 oz)   07/07/21 90.7 kg (200 lb)         EKG: unchanged from previous tracings, normal sinus rhythm, second-degree type I, Wenckebach.

## 2021-10-06 NOTE — PROGRESS NOTES
Estelle Denis presents today for   Chief Complaint   Patient presents with    Follow-up     7 month follow up        Estelle Denis preferred language for health care discussion is english/other. Is someone accompanying this pt? no    Is the patient using any DME equipment during OV?no    Depression Screening:  3 most recent PHQ Screens 2/16/2021   Little interest or pleasure in doing things Not at all   Feeling down, depressed, irritable, or hopeless Not at all   Total Score PHQ 2 0   Trouble falling or staying asleep, or sleeping too much -   Feeling tired or having little energy -   Poor appetite, weight loss, or overeating -   Feeling bad about yourself - or that you are a failure or have let yourself or your family down -   Trouble concentrating on things such as school, work, reading, or watching TV -   Moving or speaking so slowly that other people could have noticed; or the opposite being so fidgety that others notice -   Thoughts of being better off dead, or hurting yourself in some way -   PHQ 9 Score -   How difficult have these problems made it for you to do your work, take care of your home and get along with others -       Learning Assessment:  Learning Assessment 8/1/2017   PRIMARY LEARNER Patient   HIGHEST LEVEL OF EDUCATION - PRIMARY LEARNER  -   CO-LEARNER CAREGIVER -   PRIMARY LANGUAGE ENGLISH   LEARNER PREFERENCE PRIMARY DEMONSTRATION   ANSWERED BY Patient   RELATIONSHIP SELF       Abuse Screening:  Abuse Screening Questionnaire 2/16/2021   Do you ever feel afraid of your partner? N   Are you in a relationship with someone who physically or mentally threatens you? N   Is it safe for you to go home? Y       Fall Risk  Fall Risk Assessment, last 12 mths 2/16/2021   Able to walk? No       Pt currently taking Anticoagulant therapy? no    Coordination of Care:  1. Have you been to the ER, urgent care clinic since your last visit? Hospitalized since your last visit? no    2.  Have you seen or consulted any other health care providers outside of the [address] [place] [address] since your last visit? Include any pap smears or colon screening.  no

## 2021-10-20 NOTE — TELEPHONE ENCOUNTER
Informed pt of message. Pt stated he will upload picture to [employer]. Will call back if he can't.
Patient just called reporting he has had a red spot that kind of aches at times on his left arm. He is status post cubital tunnel release on this arm. He states he has had this red spot for about a week. He says he is due to return to work on Monday but does not want to return if he shouldn't due to infection or if there is something wrong with the arm. He was hoping to get an appt for the doctor to take a look at it prior to his return on Monday. I did explain availability is not plentiful for a while. Please advise if he can be seen or what else he should do.      Patient [phone]
Please have patient upload a picture to [employer]
uterus firm @ midline 1F below, bleeding moderate to light. patient complains of HA, RUQ and cramping pain 7/10. patient denies dizziness/ lightheadedness/ n/v. patient BP @2117 136/89 HR 93. patient afebrile.

## 2022-03-19 PROBLEM — F32.2 SEVERE SINGLE CURRENT EPISODE OF MAJOR DEPRESSIVE DISORDER, WITHOUT PSYCHOTIC FEATURES (HCC): Status: ACTIVE | Noted: 2017-12-29

## 2022-03-19 PROBLEM — E78.5 HYPERLIPIDEMIA: Status: ACTIVE | Noted: 2017-03-24

## 2022-03-19 PROBLEM — R80.9 MICROALBUMINURIA DUE TO TYPE 2 DIABETES MELLITUS (HCC): Status: ACTIVE | Noted: 2018-08-03

## 2022-03-19 PROBLEM — E11.29 MICROALBUMINURIA DUE TO TYPE 2 DIABETES MELLITUS (HCC): Status: ACTIVE | Noted: 2018-08-03

## 2022-03-19 PROBLEM — E11.40 TYPE 2 DIABETES MELLITUS WITH DIABETIC NEUROPATHY (HCC): Status: ACTIVE | Noted: 2019-08-20

## 2022-03-19 PROBLEM — G56.22 CUBITAL TUNNEL SYNDROME, LEFT: Status: ACTIVE | Noted: 2021-06-29

## 2022-03-20 PROBLEM — G56.21 CUBITAL TUNNEL SYNDROME, RIGHT: Status: ACTIVE | Noted: 2021-05-18

## 2022-04-24 ENCOUNTER — HOSPITAL ENCOUNTER (EMERGENCY)
Age: 57
Discharge: HOME | End: 2022-04-24
Attending: EMERGENCY MEDICINE
Payer: COMMERCIAL

## 2022-04-24 ENCOUNTER — APPOINTMENT (OUTPATIENT)
Dept: GENERAL RADIOLOGY | Age: 57
End: 2022-04-24
Attending: EMERGENCY MEDICINE
Payer: COMMERCIAL

## 2022-04-24 VITALS
HEART RATE: 63 BPM | OXYGEN SATURATION: 98 % | TEMPERATURE: 98.5 F | DIASTOLIC BLOOD PRESSURE: 90 MMHG | RESPIRATION RATE: 16 BRPM | SYSTOLIC BLOOD PRESSURE: 193 MMHG

## 2022-04-24 DIAGNOSIS — T14.8XXA ABRASION: ICD-10-CM

## 2022-04-24 DIAGNOSIS — S20.211A CONTUSION OF RIB ON RIGHT SIDE, INITIAL ENCOUNTER: Primary | ICD-10-CM

## 2022-04-24 PROCEDURE — 74011000250 HC RX REV CODE- 250: Performed by: EMERGENCY MEDICINE

## 2022-04-24 PROCEDURE — 90715 TDAP VACCINE 7 YRS/> IM: CPT | Performed by: EMERGENCY MEDICINE

## 2022-04-24 PROCEDURE — 74011250636 HC RX REV CODE- 250/636: Performed by: EMERGENCY MEDICINE

## 2022-04-24 PROCEDURE — 90471 IMMUNIZATION ADMIN: CPT

## 2022-04-24 PROCEDURE — 73562 X-RAY EXAM OF KNEE 3: CPT

## 2022-04-24 PROCEDURE — 99284 EMERGENCY DEPT VISIT MOD MDM: CPT

## 2022-04-24 PROCEDURE — 74011250637 HC RX REV CODE- 250/637: Performed by: EMERGENCY MEDICINE

## 2022-04-24 PROCEDURE — 71046 X-RAY EXAM CHEST 2 VIEWS: CPT

## 2022-04-24 PROCEDURE — 71100 X-RAY EXAM RIBS UNI 2 VIEWS: CPT

## 2022-04-24 RX ORDER — IBUPROFEN 600 MG/1
600 TABLET ORAL
Qty: 20 TABLET | Refills: 0 | Status: SHIPPED | OUTPATIENT
Start: 2022-04-24

## 2022-04-24 RX ORDER — IBUPROFEN 600 MG/1
600 TABLET ORAL ONCE
Status: COMPLETED | OUTPATIENT
Start: 2022-04-24 | End: 2022-04-24

## 2022-04-24 RX ORDER — HYDROCODONE BITARTRATE AND ACETAMINOPHEN 5; 325 MG/1; MG/1
1 TABLET ORAL
Qty: 10 TABLET | Refills: 0 | Status: SHIPPED | OUTPATIENT
Start: 2022-04-24 | End: 2022-04-26

## 2022-04-24 RX ORDER — LIDOCAINE 4 G/100G
1 PATCH TOPICAL EVERY 24 HOURS
Status: DISCONTINUED | OUTPATIENT
Start: 2022-04-24 | End: 2022-04-24 | Stop reason: HOSPADM

## 2022-04-24 RX ORDER — LIDOCAINE 50 MG/G
PATCH TOPICAL
Qty: 1 EACH | Refills: 0 | Status: SHIPPED | OUTPATIENT
Start: 2022-04-24

## 2022-04-24 RX ADMIN — IBUPROFEN 600 MG: 600 TABLET ORAL at 20:07

## 2022-04-24 RX ADMIN — TETANUS TOXOID, REDUCED DIPHTHERIA TOXOID AND ACELLULAR PERTUSSIS VACCINE, ADSORBED 0.5 ML: 5; 2.5; 8; 8; 2.5 SUSPENSION INTRAMUSCULAR at 20:07

## 2022-04-24 NOTE — ED TRIAGE NOTES
Pt reports fall Friday tripping over cement block, pt noted to have abrasions to nose, right arm/hand, left arm, and left knee. Pt c/o right rib pain with deep breathing.

## 2022-04-24 NOTE — ED PROVIDER NOTES
EMERGENCY DEPARTMENT HISTORY AND PHYSICAL EXAM    6:56 PM  Date: 4/24/2022  Patient Name: Xavi Stevens    History of Presenting Illness     Chief Complaint   Patient presents with    Fall        History Provided By: Patient    HPI: Xavi Stevens is a 60 y.o. male with medical problems as below. Patient is presenting with right-sided chest pain after mechanical fall he sustained 3 days ago. He tripped over an object and hit his face and the right side of his body. Is complaining of right rib pain that is worse with coughing or breathing or movement. Denies shortness of breath otherwise. No nausea or vomiting. Denies hematuria or dark urine. No LOC, headache or any other neuro symptoms. Is also complaining of left knee pain and right hand pain where he sustained abrasions and injuries. Has been able to ambulate without difficulty. Does not recall his last Tdap. Location:  Severity:  Timing/course: Onset/Duration:     PCP: Estelle Granger MD    Past History     Past Medical History:  Past Medical History:   Diagnosis Date    Abnormal nuclear cardiac imaging test 01/31/2012    Mod inferior infarction w/mild-mod simona-infarct ischemia. LVE. EF 43%. Marked basal inferior hypk; otherwise mild-mod inferior, inferolateral, distal anteroapical hypk. TID index 1.07. Pos max EST suggests subtotal obstruction of RCA.  Arthritis     Calculus of kidney     Chronic pain     Coronary artery disease     Inferior MI in 1998 status post RCA stent; ISR in 1999 status post rotational atherectomy. Posterior wall MI 2002 with circumflex stent.  Depression     Dyslipidemia     History of echocardiogram 10/09/2012    EF 50-55%. No WMA. Gr 1 DDfx. RVSP normal.  LAE.  IVCE.  Hyperlipidemia     Hypertension     mild    Long term current use of anticoagulant therapy     Neuropathy     Non-insulin dependent diabetes mellitus     Renal duplex 12/02/2013    No significant RA stenosis. Patent bilateral renal veins w/o thrombosis.  S/P attempted coronary angioplasty 10/08/2012    Unsuccessful angioplasty of chronic RCA occlusion w/collaterals.  S/P cardiac cath 02/17/2012    %. ISR. LM patent. LAD patent. pD1 55%. mCX patent. OM1 80% (3 x 30 [place] stent o/lapped w/3 x 12 [place] stent; residual 0%). LVEDP 13. EF 50-55%.  Second degree AV block, Mobitz type I     Chronic/recurrent       Past Surgical History:  Past Surgical History:   Procedure Laterality Date    COLONOSCOPY N/A 4/24/2019    COLONOS[facility] performed by Geraldine De Paz [facility] at [facility] ENDOSCOPY    HX APPENDECTOMY      HX 58 Machiton Scholis Chorophilakis    Fx C4-5    HX UROLOGICAL Right     kidney stone    HX UROLOGICAL Bilateral     orchiopexy for torsion    MD CARDIAC SURG PROCEDURE UNLIST      stents x3    UPPER ARM/ELBOW SURGERY UNLISTED Right     UPPER ARM/ELBOW SURGERY UNLISTED Left        Family History:  Family History   Problem Relation Age of Onset    Heart Disease Mother     Diabetes Father     Heart Disease Maternal Grandfather        Social History:  Social History     Tobacco Use    Smoking status: Former Smoker     Years: 20.00     Quit date: 1/24/2007     Years since quitting: 15.2    Smokeless tobacco: Never Used   Vaping Use    Vaping Use: Never used   Substance Use Topics    Alcohol use: No    Drug use: Not Currently     Types: Marijuana, Cocaine     Comment: during early 20's       Allergies: Allergies   Allergen Reactions    Other Plant, Animal, Environmental Other (comments)     Surgical suture- redness       Review of Systems   Review of Systems   Cardiovascular: Positive for chest pain. Musculoskeletal: Positive for myalgias. Skin: Positive for wound. All other systems reviewed and are negative.        Physical Exam     Patient Vitals for the past 12 hrs:   Temp Pulse Resp BP SpO2   04/24/22 1737 98.5 °F (36.9 °C) 63 16 (!) 193/90 98 %       Physical Exam  Vitals and nursing note reviewed. Constitutional:       General: He is not in acute distress. Appearance: Normal appearance. HENT:      Head: Normocephalic. Nose:      Right Nostril: No septal hematoma. Left Nostril: No septal hematoma. Eyes:      Extraocular Movements: Extraocular movements intact. Conjunctiva/sclera: Conjunctivae normal.   Cardiovascular:      Pulses: Normal pulses. Pulmonary:      Effort: Pulmonary effort is normal.      Breath sounds: Normal breath sounds. Chest:      Chest wall: Tenderness present. Abdominal:      Palpations: Abdomen is soft. Tenderness: There is no abdominal tenderness. There is no right CVA tenderness or left CVA tenderness. Musculoskeletal:         General: Normal range of motion. Cervical back: Normal range of motion and neck supple. No tenderness. Legs:    Skin:     General: Skin is warm. Neurological:      General: No focal deficit present. Mental Status: He is alert and oriented to person, place, and time. Psychiatric:         Mood and Affect: Mood normal.         Behavior: Behavior normal.         Diagnostic Study Results     Labs -  No results found for this or any previous visit (from the past 12 hour(s)). Radiologic Studies -   No results found. Medical Decision Making     ED Course: Progress Notes, Reevaluation, and Consults:    6:56 PM Initial assessment performed. The patients presenting problems have been discussed, and they/their family are in agreement with the care plan formulated and outlined with them. I have encouraged them to ask questions as they arise throughout their visit. Provider Notes (Medical Decision Making): 57-year-old gentleman presenting with right-sided rib pain after mechanical fall sustained 3 days ago. Well-appearing on exam and not in distress. He has abrasions over his nose without any nasal deformity or tenderness, abrasion over the right hand without bony tenderness. Abrasion over the left knee with diffuse knee tenderness but intact range of motion. Significant tenderness to his right chest wall but no abdominal tenderness. X-rays were ordered in triage and no evidence of pneumothorax or rib fractures per my interpretations. Possibly rib contusion. Patient did not want to wait for UA, states that his urine has been very clear and he does not have any abdominal tenderness or back pain. Will discharge on pain control as well as incentive spirometry. Patient was provided with care instructions and strict return precautions. Procedures:     Critical Care Time:     Vital Signs-Reviewed the patient's vital signs. Reviewed pt's pulse ox reading. EKG: Interpreted by the EP. Time Interpreted:    Rate:    Rhythm:    Interpretation:   Comparison:     Records Reviewed: Nursing Notes and Old Medical Records (Time of Review: 6:56 PM)  -I am the first provider for this patient.  -I reviewed the vital signs, available nursing notes, past medical history, past surgical history, family history and social history. Current Facility-Administered Medications   Medication Dose Route Frequency Provider Last Rate Last Admin    lidocaine 4 % patch 1 Patch  1 Patch TransDERmal Q24H Michael Beatty MD         Current Outpatient Medications   Medication Sig Dispense Refill    Nucynta 75 mg tablet Take 75 mg by mouth three (3) times daily.  aspirin 81 mg chewable tablet Take 81 mg by mouth daily.  rOPINIRole (REQUIP) 0.5 mg tablet Take 0.5 mg by mouth once over twenty-four (24) hours.  TRULICITY 6.83 RO/5.1 mL sub-q pen 0.5 ML BY SUBCUTANEOUS ROUTE EVERY SEVEN (7) DAYS. (Patient taking differently: 1.5 mg. Inject subcutaneously into the upper arm, thigh, or abdomen; rotate injection sites each week.  May administer without regard to meals or time of day.) 6 Pen 3    Insulin Needles, Disposable, (SHADE PEN NEEDLE) 32 gauge x 5/32\" ndle 1 Units by Does Not Apply route two (2) times a day. 200 Pen Needle 4    sertraline (ZOLOFT) 50 mg tablet TAKE 1 TAB BY MOUTH DAILY. APPOINTMENT NEEDED TO ESTABLISH CARE 30 Tab 2    gabapentin (NEURONTIN) 800 mg tablet TAKE 1 TAB BY MOUTH 3 TIMES A DAY 90 Tab 0    hydroCHLOROthiazide (HYDRODIURIL) 25 mg tablet Take 1 Tab by mouth daily. Appointment needed to establish care  Indications: high blood pressure 30 Tab 2    flash glucose scanning reader (FREESTYLE BENSON 14 DAY READER) misc 1 Kit by Does Not Apply route three (3) times daily. E11.9 4 Each 2    flash glucose sensor (FREESTYLE BENSON 14 DAY SENSOR) kit Take BG reading TID (E11.9) 5 Kit 1    insulin glargine (LANTUS,BASAGLAR) 100 unit/mL (3 mL) inpn Take 10 units nightly (Patient taking differently: 25 Units daily. Take 10 units nightly) 5 Adjustable Dose Pre-filled Pen Syringe 3    metFORMIN (GLUCOPHAGE) 1,000 mg tablet Take 0.5 Tabs by mouth two (2) times daily (with meals). Indications: type 2 diabetes mellitus 180 Tab 1    atorvastatin (LIPITOR) 40 mg tablet Take 1 Tab by mouth daily. 90 Tab 3    amLODIPine (NORVASC) 10 mg tablet TAKE 1 TABLET DAILY 90 Tab 1    ramipril (ALTACE) 10 mg capsule Take 1 Cap by mouth daily. Indications: high blood pressure 90 Cap 1    traZODone (DESYREL) 100 mg tablet TAKE 1 TAB BY MOUTH NIGHTLY. APPOINTMENT NEEDED TO ESTABLISH CARE 90 Tab 1    fenofibrate nanocrystallized (TRICOR) 145 mg tablet TAKE 1 TABLET DAILY 90 Tab 1    Blood-Glucose Meter monitoring kit Use once daily as directed. 1 Kit 0    glucose blood VI test strips (BLOOD GLUCOSE TEST) strip Use daily as directed - must match his glucometer. 100 Strip 3    Lancets misc Use daily as directed 100 Each 3    MULTIVITAMIN PO Take 1 tablet by mouth every morning. Clinical Impression     Clinical Impression: No diagnosis found. Disposition: DC      This note was dictated utilizing voice recognition software which may lead to typographical errors.   I apologize in advance if the situation occurs. If questions arise please do not hesitate to contact me or call our department.     Cindi Castillo MD  6:56 PM

## 2022-04-24 NOTE — Clinical Note
[address] [facility] EMERGENCY DEPT  [id number] [id number] [phone]    Work/School Note    Date: 4/24/2022    To Whom It May concern:    Sunil Anton was seen and treated today in the emergency room by the following provider(s):  Attending Provider: Ari Prescott MD.      Sunil Anton is excused from work/school on 04/24/22 and 04/25/22. He is medically clear to return to work/school on 4/26/2022.        Sincerely,          Pastora Stiles RN

## 2022-04-24 NOTE — Clinical Note
[address] [facility] EMERGENCY DEPT  [id number] [address] [phone]    Work/School Note    Date: 4/24/2022    To Whom It May concern:    Adenike Cleveland was seen and treated today in the emergency room by the following provider(s):  Attending Provider: Ross Floyd MD.      Adenike Cleveland is excused from work/school on 04/24/22 and 04/25/22. He is medically clear to return to work/school on 4/26/2022.        Sincerely,          Michael Mcnally MD

## 2022-04-25 NOTE — ED NOTES
Received nursing report from [facility]. Patient resting comfortably on stretcher, abrasions noted to right forearm, hand, left forearm and tip of nose. No open wounds noted. Awaiting patient to give urine sample, patient verbalized understanding.

## 2023-03-08 ENCOUNTER — CLINICAL DOCUMENTATION (OUTPATIENT)
Facility: HOSPITAL | Age: 58
End: 2023-03-08

## 2023-03-08 ENCOUNTER — TELEPHONE (OUTPATIENT)
Age: 58
End: 2023-03-08

## 2023-03-08 RX ORDER — DAPAGLIFLOZIN 10 MG/1
10 TABLET, FILM COATED ORAL DAILY
COMMUNITY
Start: 2023-03-08

## 2023-03-08 RX ORDER — CLOPIDOGREL BISULFATE 75 MG/1
75 TABLET ORAL DAILY
COMMUNITY

## 2023-03-08 RX ORDER — PREGABALIN 100 MG/1
100 CAPSULE ORAL 3 TIMES DAILY
COMMUNITY
Start: 2023-03-01

## 2023-03-08 RX ORDER — HYDROCHLOROTHIAZIDE 12.5 MG/1
12.5 TABLET ORAL DAILY
COMMUNITY
Start: 2023-02-11

## 2023-03-09 ENCOUNTER — TELEPHONE (OUTPATIENT)
Age: 58
End: 2023-03-09

## 2023-03-09 NOTE — TELEPHONE ENCOUNTER
Patient called the office stating he has been having high blood pressure readings, 140's and above. Bradycardia - heart rate dropping in the 30's at times. Also having bilateral lower extremity swelling, more in the right leg than left. Patient does have a history of Wenkebach. Dr. Michael Acuña his kidney doctor just started Farxiga 10 mg once a day. He would like to come in and be seen due not being seen since 10-. Verbal order and read back per Rodrigo José MD  Schedule appointment on Tuesday to be seen.

## 2023-03-14 ENCOUNTER — OFFICE VISIT (OUTPATIENT)
Age: 58
End: 2023-03-14
Payer: COMMERCIAL

## 2023-03-14 VITALS
SYSTOLIC BLOOD PRESSURE: 140 MMHG | HEART RATE: 79 BPM | BODY MASS INDEX: 28.28 KG/M2 | HEIGHT: 71 IN | DIASTOLIC BLOOD PRESSURE: 90 MMHG | WEIGHT: 202 LBS | OXYGEN SATURATION: 95 %

## 2023-03-14 DIAGNOSIS — R06.02 SOB (SHORTNESS OF BREATH): ICD-10-CM

## 2023-03-14 DIAGNOSIS — R07.9 CHEST PAIN, UNSPECIFIED TYPE: ICD-10-CM

## 2023-03-14 DIAGNOSIS — M79.604 PAIN IN BOTH LOWER EXTREMITIES: Primary | ICD-10-CM

## 2023-03-14 DIAGNOSIS — M79.605 PAIN IN BOTH LOWER EXTREMITIES: Primary | ICD-10-CM

## 2023-03-14 DIAGNOSIS — I25.5 ISCHEMIC CARDIOMYOPATHY: ICD-10-CM

## 2023-03-14 PROCEDURE — 93000 ELECTROCARDIOGRAM COMPLETE: CPT | Performed by: INTERNAL MEDICINE

## 2023-03-14 PROCEDURE — 3080F DIAST BP >= 90 MM HG: CPT | Performed by: INTERNAL MEDICINE

## 2023-03-14 PROCEDURE — 3077F SYST BP >= 140 MM HG: CPT | Performed by: INTERNAL MEDICINE

## 2023-03-14 PROCEDURE — 99214 OFFICE O/P EST MOD 30 MIN: CPT | Performed by: INTERNAL MEDICINE

## 2023-03-14 RX ORDER — ERGOCALCIFEROL 1.25 MG/1
50000 CAPSULE ORAL WEEKLY
COMMUNITY

## 2023-03-14 ASSESSMENT — PATIENT HEALTH QUESTIONNAIRE - PHQ9
1. LITTLE INTEREST OR PLEASURE IN DOING THINGS: 0
3. TROUBLE FALLING OR STAYING ASLEEP: 0
9. THOUGHTS THAT YOU WOULD BE BETTER OFF DEAD, OR OF HURTING YOURSELF: 0
5. POOR APPETITE OR OVEREATING: 0
6. FEELING BAD ABOUT YOURSELF - OR THAT YOU ARE A FAILURE OR HAVE LET YOURSELF OR YOUR FAMILY DOWN: 0
2. FEELING DOWN, DEPRESSED OR HOPELESS: 0
7. TROUBLE CONCENTRATING ON THINGS, SUCH AS READING THE NEWSPAPER OR WATCHING TELEVISION: 0
SUM OF ALL RESPONSES TO PHQ9 QUESTIONS 1 & 2: 0
8. MOVING OR SPEAKING SO SLOWLY THAT OTHER PEOPLE COULD HAVE NOTICED. OR THE OPPOSITE, BEING SO FIGETY OR RESTLESS THAT YOU HAVE BEEN MOVING AROUND A LOT MORE THAN USUAL: 0
SUM OF ALL RESPONSES TO PHQ QUESTIONS 1-9: 0
4. FEELING TIRED OR HAVING LITTLE ENERGY: 0
10. IF YOU CHECKED OFF ANY PROBLEMS, HOW DIFFICULT HAVE THESE PROBLEMS MADE IT FOR YOU TO DO YOUR WORK, TAKE CARE OF THINGS AT HOME, OR GET ALONG WITH OTHER PEOPLE: 0
SUM OF ALL RESPONSES TO PHQ QUESTIONS 1-9: 0

## 2023-03-14 NOTE — PROGRESS NOTES
HISTORY OF PRESENT ILLNESS  Joseph Santos  57 y.o. male     Chief Complaint   Patient presents with    Follow-up     Overdue 6 month     Edema     Rt leg,ankle and foot swelling on/off.    Palpitations     Pounding palps on/off       ASSESSMENT and PLAN    The primary encounter diagnosis was Pain in both lower extremities. Diagnoses of Ischemic cardiomyopathy, Chest pain, unspecified type, and SOB (shortness of breath) were also pertinent to this visit.    Mr. Joseph Santos has history of CAD. He has known occluded RCA noted back in 1998 with in-stent restenosis. He did Undergo rotational atherectomy in 1999 but subsequently had restenosis again. Back in October of 2012,  intervention was attempted without success. Patient was continued on medical therapy.  He has significant first-degree AV block with CA interval of 300 ms, which has now progressed to second-degree type I heart block, asymptomatic.    CAD:    He has been having some episodes of chest pains.  He has also noticed some palpitations.  With his known history of CAD, I have recommended proceeding on with echocardiogram and exercise nuclear scan.  BP:    Mildly elevated at 158/96.  He needs to continue on his antihypertensive regimen.  First-degree AVB:    Sinus rhythm at 79 bpm with CA prolongation.  CHF:    There is no evidence of decompensated CHF noted.  Weight:     His weight today is 202 pounds.  His baseline weight is 196 pounds.  Cholesterol:   Target LDL <70.    Lipitor 40, TriCor 145  Anti-platelet:   Remains on ASA.     He has not had any symptoms consistent with significant heart block.  We discussed at length, about possible permanent pacemaker insertion if he develops irreversible symptomatic bradycardia.  All questions were answered.  If he develops any symptoms consistent with heart block, he will get back in touch with me.  For his right calf swelling and discomfort, I have requested bilateral lower extremity venous duplex scan.   If the above testing is unremarkable, I will see him back  in 6 months. Thank you. Orders Placed This Encounter   Procedures    EKG 12 Lead     Order Specific Question:   Reason for Exam?     Answer: Other    Vascular duplex lower extremity venous bilateral     Standing Status:   Future     Standing Expiration Date:   3/14/2024        HPI  Today, Mr. Demond Fowler has no complaints of dizziness. He has been having some episodes of palpitations, without dizziness. He has also had some episodes of right calf discomfort and some chest discomfort. He has a chest discomfort when he has emotional stress. He has not noticed significant chest discomfort with physical exertion. Because of calf pains, he is not overly active physically. He denies any orthopnea or PND. Review of Systems  14 point review of system is negative unless mentioned in HPI    Physical Exam  Vitals and nursing note reviewed. Constitutional:       Appearance: Normal appearance. HENT:      Head: Normocephalic. Eyes:      Conjunctiva/sclera: Conjunctivae normal.   Neck:      Vascular: No carotid bruit. Cardiovascular:      Rate and Rhythm: Normal rate and regular rhythm. Pulmonary:      Breath sounds: Normal breath sounds. Abdominal:      Palpations: Abdomen is soft. Musculoskeletal:         General: No swelling. Cervical back: No rigidity. Skin:     General: Skin is warm and dry. Neurological:      General: No focal deficit present. Mental Status: He is alert and oriented to person, place, and time. Psychiatric:         Mood and Affect: Mood normal.         Behavior: Behavior normal.          PCP: Ariana Bright MD    Past Medical History:   Diagnosis Date    Abnormal nuclear cardiac imaging test 01/31/2012    Mod inferior infarction w/mild-mod bright-infarct ischemia. LVE. EF 43%. Marked basal inferior hypk; otherwise mild-mod inferior, inferolateral, distal anteroapical hypk. TID index 1.07.   Pos max EST suggests subtotal obstruction of RCA. Arthritis     Calculus of kidney     Chronic pain     Coronary atherosclerosis of native coronary artery     Inferior MI in 1998 status post RCA stent; ISR in 1999 status post rotational atherectomy. Posterior wall MI 2002 with circumflex stent. Depression     Dyslipidemia     History of echocardiogram 10/09/2012    EF 50-55%. No WMA. Gr 1 DDfx. RVSP normal.  LAE.  IVCE. Hyperlipidemia     Hypertension     mild    Long term current use of anticoagulant therapy     Neuropathy     Renal artery stenosis ([address] 12/02/2013    No significant RA stenosis. Patent bilateral renal veins w/o thrombosis. S/P cardiac cath 02/17/2012    %. ISR. LM patent. LAD patent. pD1 55%. mCX patent. OM1 80% (3 x 30 Penrose stent o/lapped w/3 x 12 Penrose stent; residual 0%). LVEDP 13. EF 50-55%. S/P coronary angioplasty 10/08/2012    Unsuccessful angioplasty of chronic RCA occlusion w/collaterals. Second degree AV block, Mobitz type I     Chronic/recurrent       Past Surgical History:   Procedure Laterality Date    APPENDECTOMY      COLONOSCOPY N/A 4/24/2019    COLONOSCOPY performed by Davis Fatima MD at [facility]    Fx C4-5    [place] UNLISTED PROCEDURE CARDIAC SURGERY      stents x3    UPPER ARM/ELBOW SURGERY UNLISTED Left     UPPER ARM/ELBOW SURGERY UNLISTED Right     UROLOGICAL SURGERY Right     kidney stone    UROLOGICAL SURGERY Bilateral     orchiopexy for torsion       Current Outpatient Medications   Medication Sig Dispense Refill    vitamin D (ERGOCALCIFEROL) 1.25 MG (11137 UT) CAPS capsule Take 50,000 Units by mouth once a week      clopidogrel (PLAVIX) 75 MG tablet Take 75 mg by mouth daily      FARXIGA 10 MG tablet Take 10 mg by mouth daily      pregabalin (LYRICA) 100 MG capsule Take 100 mg by mouth in the morning, at noon, and at bedtime.       hydroCHLOROthiazide (HYDRODIURIL) 12.5 MG tablet Take 12.5 mg by mouth daily      Lancets MISC Use daily as directed      Multiple Vitamin (MULTIVITAMIN PO) Take 1 tablet by mouth daily CENTRUM SILVER      amLODIPine (NORVASC) 10 MG tablet Take 10 mg by mouth daily      aspirin 81 MG chewable tablet Take 81 mg by mouth daily      atorvastatin (LIPITOR) 40 MG tablet Take 40 mg by mouth daily      Dulaglutide (TRULICITY) 3.05 XL/2.4WZ SOPN Inject 0.75 mg as directed every 7 days      fenofibrate (TRICOR) 145 MG tablet Take 145 mg by mouth daily      insulin glargine (LANTUS SOLOSTAR) 100 UNIT/ML injection pen Inject 25 Units into the skin nightly      ramipril (ALTACE) 10 MG capsule Take 10 mg by mouth daily      rOPINIRole (REQUIP) 0.5 MG tablet Take 0.5 mg by mouth daily      sertraline (ZOLOFT) 50 MG tablet Take 50 mg by mouth daily      traZODone (DESYREL) 100 MG tablet Take 100 mg by mouth nightly       No current facility-administered medications for this visit. The patient has a family history of    Social History     Tobacco Use    Smoking status: Former     Types: Cigarettes     Quit date: 2007     Years since quittin.1    Smokeless tobacco: Never   Substance Use Topics    Alcohol use: No    Drug use: Not Currently     Types: Marijuana [place]), Cocaine       Lab Results   Component Value Date/Time    CHOL 161 2020 01:10 PM    HDL 36 2020 01:10 PM        BP Readings from Last 3 Encounters:   23 (!) 158/96   10/06/21 134/86        Pulse Readings from Last 3 Encounters:   23 79   10/06/21 69   21 75       Wt Readings from Last 3 Encounters:   23 202 lb (91.6 kg)   10/06/21 196 lb (88.9 kg)   21 197 lb 3.2 oz (89.4 kg)         EKG: normal sinus rhythm, nonspecific ST and T waves changes, Q waves in leads III, aVF, 1st degree AV block, unchanged from previous tracings.      Crystal Rodriguez MD   3/14/2023

## 2023-03-14 NOTE — PATIENT INSTRUCTIONS
If you have not heard from the central scheduler to schedule your testing in 48 hours, please call [phone].

## 2023-03-14 NOTE — PROGRESS NOTES
Sisi Chacko presents today for   Chief Complaint   Patient presents with    Follow-up     Overdue 6 month     Edema     Rt leg,ankle and foot swelling on/off. Palpitations     Pounding palps on/off       Sisi Chacko preferred language for health care discussion is english/other. Is someone accompanying this pt? NO    Is the patient using any DME equipment during OV? NO    Depression Screening:  Depression: Not at risk    PHQ-2 Score: 0        Learning Assessment:  Who is the primary learner? Patient    What is the preferred language for health care of the primary learner? ENGLISH    How does the primary learner prefer to learn new concepts? DEMONSTRATION    Answered By patient    Relationship to Learner SELF           Pt currently taking Anticoagulant therapy? NO    Pt currently taking Antiplatelet therapy ? ASA 81 MG 1X DAILY AND PLAVIX 75 MG 1X DAILY       Coordination of Care:  1. Have you been to the ER, urgent care clinic since your last visit? Hospitalized since your last visit? NO    2. Have you seen or consulted any other health care providers outside of the [address] [place] [address] since your last visit? Include any pap smears or colon screening.  NO

## 2023-03-20 NOTE — PROGRESS NOTES
nocturnal myoclonus (G25.81, G25.3)    Fall (D98.SQIR)    Melena (K92.1)    Cellulitis of arm (L03.119)    Anemia (D64.9)    Heart disease (I51.9)    Myocardial infarct (I21.9)    Diabetic neuropathy (E11.40)    Kidney stone (N20.0)    Eczema (L30.9)    Psoriasis (L40.9)    Problems Reconciled      Allergies (Genie Rice; 3/8/2023 2:56 PM)  No Known Drug Allergies   [03/08/2023]: Allergies Reconciled      Social History (Mikaela High; 3/8/2023 3:02 PM)  No Drug Use    Tobacco use   Former smoker. Alcohol Use   Occasional alcohol use. Medication History (Mikaela High; 3/8/2023 3:02 PM)  Lipitor  (80mg tablet, 1 oral daily) Active. ramipriL  (10mg capsule, 1 oral daily) Active.  ergocalciferol (vitamin D2)  (1,250 mcg(50,000 un capsule, 1 oral weekly) Active. Trulicity  (3BX/1.2 mL Pen Injector, 0.5 subcutaneous weekly) Active. Lantus Solostar U-100 Insulin  (100 unit/mL(3 mL) Insulin Pen, 25 units subcutaneous at bedtime) Active. hydroCHLOROthiazide  (12.5mg capsule, 1 oral daily) Active. pregabalin  (100mg capsule, 1 oral daily) Active. amLODIPine  (10mg tablet, 1 oral at bedtime) Active. cetirizine  (10mg tablet, 1 oral daily) Active. Plavix  (75mg tablet, 1 oral daily) Active. rOPINIRole  (0.5mg tablet, 1 oral at bedtime) Active. sertraline  (50mg tablet, 1 oral daily) Active. losartan  (25mg tablet, 1 oral daily) Active. HumaLOG KwikPen Insulin  (200 unit/mL(3 mL) Insulin Pen, subcutaneous sliding scale) Active. fenofibrate nanocrystallized  (145mg tablet, 1 oral daily) Active. aspirin  (81mg tablet, delayed, 1 oral daily) Active. Altace  (1 daily) Active. Medications Reconciled     Past Surgical History Mikaela High; 3/8/2023 9:41 AM)  angloplasty    lithtripsy    appendectomy      Health Maintenance History (Mikaela High; 3/8/2023 3:03 PM)  Colonoscopy, Screening   [04/12/2019]:  Flu Vaccine   Refused.         Review of Systems Yvette Short MD; 3/20/2023 5:23 AM)  General Not Present-

## 2023-04-19 ENCOUNTER — HOSPITAL ENCOUNTER (OUTPATIENT)
Facility: HOSPITAL | Age: 58
Discharge: HOME | End: 2023-04-22
Payer: COMMERCIAL

## 2023-04-19 DIAGNOSIS — M54.2 NECK PAIN: ICD-10-CM

## 2023-04-19 DIAGNOSIS — M54.9 UPPER BACK PAIN: ICD-10-CM

## 2023-04-19 PROCEDURE — 72050 X-RAY EXAM NECK SPINE 4/5VWS: CPT

## 2023-04-19 PROCEDURE — 72070 X-RAY EXAM THORAC SPINE 2VWS: CPT

## 2023-10-04 ENCOUNTER — OFFICE VISIT (OUTPATIENT)
Age: 58
End: 2023-10-04
Payer: COMMERCIAL

## 2023-10-04 VITALS
OXYGEN SATURATION: 96 % | WEIGHT: 197 LBS | SYSTOLIC BLOOD PRESSURE: 126 MMHG | HEIGHT: 71 IN | HEART RATE: 53 BPM | BODY MASS INDEX: 27.58 KG/M2 | DIASTOLIC BLOOD PRESSURE: 70 MMHG

## 2023-10-04 DIAGNOSIS — I25.5 ISCHEMIC CARDIOMYOPATHY: ICD-10-CM

## 2023-10-04 DIAGNOSIS — M79.604 PAIN IN BOTH LOWER EXTREMITIES: Primary | ICD-10-CM

## 2023-10-04 DIAGNOSIS — R06.02 SOB (SHORTNESS OF BREATH): ICD-10-CM

## 2023-10-04 DIAGNOSIS — R07.9 CHEST PAIN, UNSPECIFIED TYPE: ICD-10-CM

## 2023-10-04 DIAGNOSIS — M79.605 PAIN IN BOTH LOWER EXTREMITIES: Primary | ICD-10-CM

## 2023-10-04 PROCEDURE — 3078F DIAST BP <80 MM HG: CPT | Performed by: INTERNAL MEDICINE

## 2023-10-04 PROCEDURE — 3074F SYST BP LT 130 MM HG: CPT | Performed by: INTERNAL MEDICINE

## 2023-10-04 PROCEDURE — 99214 OFFICE O/P EST MOD 30 MIN: CPT | Performed by: INTERNAL MEDICINE

## 2023-10-04 PROCEDURE — 93000 ELECTROCARDIOGRAM COMPLETE: CPT | Performed by: INTERNAL MEDICINE

## 2023-10-04 RX ORDER — ATORVASTATIN CALCIUM 40 MG/1
40 TABLET, FILM COATED ORAL DAILY
Qty: 30 TABLET | Refills: 6 | Status: SHIPPED | OUTPATIENT
Start: 2023-10-04

## 2023-10-04 ASSESSMENT — PATIENT HEALTH QUESTIONNAIRE - PHQ9
4. FEELING TIRED OR HAVING LITTLE ENERGY: 0
SUM OF ALL RESPONSES TO PHQ QUESTIONS 1-9: 0
5. POOR APPETITE OR OVEREATING: 0
6. FEELING BAD ABOUT YOURSELF - OR THAT YOU ARE A FAILURE OR HAVE LET YOURSELF OR YOUR FAMILY DOWN: 0
3. TROUBLE FALLING OR STAYING ASLEEP: 0
SUM OF ALL RESPONSES TO PHQ9 QUESTIONS 1 & 2: 0
SUM OF ALL RESPONSES TO PHQ QUESTIONS 1-9: 0
SUM OF ALL RESPONSES TO PHQ QUESTIONS 1-9: 0
10. IF YOU CHECKED OFF ANY PROBLEMS, HOW DIFFICULT HAVE THESE PROBLEMS MADE IT FOR YOU TO DO YOUR WORK, TAKE CARE OF THINGS AT HOME, OR GET ALONG WITH OTHER PEOPLE: 0
1. LITTLE INTEREST OR PLEASURE IN DOING THINGS: 0
SUM OF ALL RESPONSES TO PHQ QUESTIONS 1-9: 0
9. THOUGHTS THAT YOU WOULD BE BETTER OFF DEAD, OR OF HURTING YOURSELF: 0
8. MOVING OR SPEAKING SO SLOWLY THAT OTHER PEOPLE COULD HAVE NOTICED. OR THE OPPOSITE, BEING SO FIGETY OR RESTLESS THAT YOU HAVE BEEN MOVING AROUND A LOT MORE THAN USUAL: 0
7. TROUBLE CONCENTRATING ON THINGS, SUCH AS READING THE NEWSPAPER OR WATCHING TELEVISION: 0
2. FEELING DOWN, DEPRESSED OR HOPELESS: 0

## 2023-10-04 NOTE — PROGRESS NOTES
Bridgettealan Ruizs presents today for   Chief Complaint   Patient presents with    6 Month Follow-Up    Edema     Right leg edema       Heaven Osorio preferred language for health care discussion is english/other. Is someone accompanying this pt? no    Is the patient using any DME equipment during OV? no    Depression Screening:  Depression: Not at risk (10/4/2023)    PHQ-2     PHQ-2 Score: 0        Learning Assessment:  Who is the primary learner? Patient    What is the preferred language for health care of the primary learner? ENGLISH    How does the primary learner prefer to learn new concepts? DEMONSTRATION    Answered By patient    Relationship to Learner SELF           Pt currently taking Anticoagulant therapy? no    Pt currently taking Antiplatelet therapy ? ASA 81 MG, Plavix 75 MG      Coordination of Care:  1. Have you been to the ER, urgent care clinic since your last visit? Hospitalized since your last visit? no    2. Have you seen or consulted any other health care providers outside of the [address] [place] [address] since your last visit? Include any pap smears or colon screening.  no ABDOMINAL PAIN

## 2023-10-04 NOTE — PROGRESS NOTES
HISTORY OF PRESENT ILLNESS  Bijal Matos  59 y.o. male     Chief Complaint   Patient presents with    6 Month Follow-Up    Edema     Right leg edema       ASSESSMENT and PLAN    The primary encounter diagnosis was Pain in both lower extremities. Diagnoses of Ischemic cardiomyopathy, Chest pain, unspecified type, and SOB (shortness of breath) were also pertinent to this visit. Mr. Heidi Smith has history of CAD. He has known occluded RCA noted back in 1998 with in-stent restenosis. He did Undergo rotational atherectomy in 1999 but subsequently had restenosis again. Back in October of 2012,  intervention was attempted without success. Patient was continued on medical therapy. He has significant first-degree AV block with KS interval of 300 ms, which has now progressed to second-degree type I heart block, asymptomatic. In April 2023, he had nuclear scan done which showed EF 43% with fixed inferior defect; no significant reversible defect was noted. His echocardiogram showed EF 40-45%. PA pressure was 12 mmHg. In 2023, he retired from [employer]. CAD:    Clinically and now does. BP:    Well-controlled 126/70. Rhythm:    Asymptomatic sinus rhythm with secondary type I heart block (Wenckebach) at 53 bpm.  Patient is not on beta-blockers. CHF:    There is no evidence of decompensated CHF noted. Weight:     His weight today is 197 pounds. His baseline weight is 196 pounds. Cholesterol:   Target LDL <70. Lipitor 40, TriCor 145  Anti-platelet:   Remains on ASA. I will see him back  in 3 months. Thank you. Orders Placed This Encounter   Procedures    EKG 12 Lead     Order Specific Question:   Reason for Exam?     Answer: Other        HPI  Today, Mr. Citlalli Pattno has no complaints of chest pains, increased dyspnea exertion, or decreased exercise capacity. He has retired from [employer]. He now does part-time work for [employer]. He denies any orthopnea, PND, or swelling.   He

## 2024-01-22 ENCOUNTER — HOSPITAL ENCOUNTER (OUTPATIENT)
Facility: HOSPITAL | Age: 59
Discharge: HOME | End: 2024-01-25
Payer: COMMERCIAL

## 2024-01-22 DIAGNOSIS — L97.529 ULCER OF LEFT FOOT, UNSPECIFIED ULCER STAGE (HCC): ICD-10-CM

## 2024-01-22 PROCEDURE — 73620 X-RAY EXAM OF FOOT: CPT

## 2024-03-29 RX ORDER — ATORVASTATIN CALCIUM 40 MG/1
40 TABLET, FILM COATED ORAL DAILY
Qty: 90 TABLET | Refills: 2 | OUTPATIENT
Start: 2024-03-29

## 2024-04-03 RX ORDER — ATORVASTATIN CALCIUM 40 MG/1
40 TABLET, FILM COATED ORAL DAILY
Qty: 90 TABLET | Refills: 2 | OUTPATIENT
Start: 2024-04-03

## 2024-04-20 ENCOUNTER — HOSPITAL ENCOUNTER (EMERGENCY)
Facility: HOSPITAL | Age: 59
Discharge: HOME | End: 2024-04-20
Attending: STUDENT IN AN ORGANIZED HEALTH CARE EDUCATION/TRAINING PROGRAM
Payer: COMMERCIAL

## 2024-04-20 VITALS
HEART RATE: 97 BPM | SYSTOLIC BLOOD PRESSURE: 139 MMHG | OXYGEN SATURATION: 100 % | DIASTOLIC BLOOD PRESSURE: 77 MMHG | TEMPERATURE: 97.8 F | RESPIRATION RATE: 18 BRPM

## 2024-04-20 DIAGNOSIS — S86.812A STRAIN OF CALF MUSCLE, LEFT, INITIAL ENCOUNTER: Primary | ICD-10-CM

## 2024-04-20 PROCEDURE — 99282 EMERGENCY DEPT VISIT SF MDM: CPT

## 2024-04-20 ASSESSMENT — PAIN SCALES - GENERAL: PAINLEVEL_OUTOF10: 7

## 2024-04-20 NOTE — DISCHARGE INSTRUCTIONS
Recommend taking extra strength Tylenol every 4-6 hours and ibuprofen 600 mg every 6 hours as needed for pain and discomfort.  Also consider over-the-counter magnesium supplements to help with the cramping sensation.  Drink extra fluids.  Use ice or heat per your preference.

## 2024-04-20 NOTE — ED NOTES
Left calf with pain. No eccymosis noted.   Left dorsalis pedis pulse positive +2, normal.   Warm.   Good cap refill

## 2024-04-20 NOTE — ED PROVIDER NOTES
[facility] EMERGENCY DEPT  EMERGENCY DEPARTMENT ENCOUNTER      Pt Name: Joseph Santos  MRN: [id number]  Birthdate 1965  Date of evaluation: 4/20/2024  Provider: Carolyn Meza MD    CHIEF COMPLAINT       Chief Complaint   Patient presents with    Leg Pain         HISTORY OF PRESENT ILLNESS   (Location/Symptom, Timing/Onset, Context/Setting, Quality, Duration, Modifying Factors, Severity)  Note limiting factors.   Joseph Santos is a 58 y.o. male who presents to the emergency department for 4-day history of pain in his left calf.  Denies initial trauma or injury.  Has not really take anything for it.  Pain is worse when he ambulates.  Denies any history of blood clots, swelling in his lower extremity, skin changes, recent immobilization.  Denies any trauma or changes in his shoes.  He does have a chronic diabetic ulcer to his left lateral foot but this is healing.  States his sugar has been well-controlled.  Denies taking any diuretics the exception of hydrochlorothiazide.  Denies any changes in her mouth is urinating.  Denies any other changes in medications.     Nursing Notes were reviewed.    REVIEW OF SYSTEMS    (2-9 systems for level 4, 10 or more for level 5)     Constitutional: No fever  Respiratory: No SOB  Cardio: No chest pain  MSK: No back pain  Skin: No rashes    Except as noted above the remainder of the review of systems was reviewed and negative.       PAST MEDICAL HISTORY     Past Medical History:   Diagnosis Date    Abnormal nuclear cardiac imaging test 01/31/2012    Mod inferior infarction w/mild-mod bright-infarct ischemia.  LVE.  EF 43%.  Marked basal inferior hypk; otherwise mild-mod inferior, inferolateral, distal anteroapical hypk.  TID index 1.07.  Pos max EST suggests subtotal obstruction of RCA.      Arthritis     Calculus of kidney     Chronic pain     Coronary atherosclerosis of native coronary artery     Inferior MI in 1998 status post RCA stent; ISR in 1999 status post rotational

## 2024-06-09 ENCOUNTER — APPOINTMENT (OUTPATIENT)
Facility: HOSPITAL | Age: 59
DRG: 917 | End: 2024-06-09
Payer: COMMERCIAL

## 2024-06-09 ENCOUNTER — HOSPITAL ENCOUNTER (INPATIENT)
Facility: HOSPITAL | Age: 59
LOS: 1 days | Discharge: HOME | DRG: 917 | End: 2024-06-11
Attending: STUDENT IN AN ORGANIZED HEALTH CARE EDUCATION/TRAINING PROGRAM | Admitting: FAMILY MEDICINE
Payer: COMMERCIAL

## 2024-06-09 DIAGNOSIS — R55 SYNCOPE AND COLLAPSE: Primary | ICD-10-CM

## 2024-06-09 DIAGNOSIS — I10 PRIMARY HYPERTENSION: ICD-10-CM

## 2024-06-09 DIAGNOSIS — I50.21 ACUTE SYSTOLIC HEART FAILURE (HCC): ICD-10-CM

## 2024-06-09 DIAGNOSIS — R55 SYNCOPE, UNSPECIFIED SYNCOPE TYPE: ICD-10-CM

## 2024-06-09 DIAGNOSIS — T50.901A ACCIDENTAL DRUG OVERDOSE, INITIAL ENCOUNTER: ICD-10-CM

## 2024-06-09 DIAGNOSIS — I25.10 CORONARY ARTERY DISEASE INVOLVING NATIVE CORONARY ARTERY OF NATIVE HEART WITHOUT ANGINA PECTORIS: ICD-10-CM

## 2024-06-09 DIAGNOSIS — I44.1 SECOND DEGREE AV BLOCK, MOBITZ TYPE I: ICD-10-CM

## 2024-06-09 DIAGNOSIS — R60.0 LOCALIZED EDEMA: ICD-10-CM

## 2024-06-09 LAB
AMPHET UR QL SCN: NEGATIVE
ANION GAP SERPL CALC-SCNC: 5 MMOL/L (ref 3–18)
BARBITURATES UR QL SCN: NEGATIVE
BASOPHILS # BLD: 0.1 K/UL (ref 0–0.1)
BASOPHILS NFR BLD: 1 % (ref 0–2)
BENZODIAZ UR QL: NEGATIVE
BUN SERPL-MCNC: 18 MG/DL (ref 7–18)
BUN/CREAT SERPL: 12 (ref 12–20)
CALCIUM SERPL-MCNC: 8.6 MG/DL (ref 8.5–10.1)
CANNABINOIDS UR QL SCN: POSITIVE
CHLORIDE SERPL-SCNC: 101 MMOL/L (ref 100–111)
CO2 SERPL-SCNC: 29 MMOL/L (ref 21–32)
COCAINE UR QL SCN: NEGATIVE
CREAT SERPL-MCNC: 1.47 MG/DL (ref 0.6–1.3)
DIFFERENTIAL METHOD BLD: ABNORMAL
EOSINOPHIL # BLD: 0.2 K/UL (ref 0–0.4)
EOSINOPHIL NFR BLD: 2 % (ref 0–5)
ERYTHROCYTE [DISTWIDTH] IN BLOOD BY AUTOMATED COUNT: 13.8 % (ref 11.6–14.5)
ETHANOL SERPL-MCNC: <3 MG/DL (ref 0–3)
GLUCOSE BLD STRIP.AUTO-MCNC: 175 MG/DL (ref 70–110)
GLUCOSE SERPL-MCNC: 180 MG/DL (ref 74–99)
HCT VFR BLD AUTO: 31.7 % (ref 36–48)
HGB BLD-MCNC: 10.8 G/DL (ref 13–16)
IMM GRANULOCYTES # BLD AUTO: 0 K/UL (ref 0–0.04)
IMM GRANULOCYTES NFR BLD AUTO: 0 % (ref 0–0.5)
LYMPHOCYTES # BLD: 4.1 K/UL (ref 0.9–3.6)
LYMPHOCYTES NFR BLD: 59 % (ref 21–52)
Lab: ABNORMAL
MAGNESIUM SERPL-MCNC: 2 MG/DL (ref 1.6–2.6)
MCH RBC QN AUTO: 30.9 PG (ref 24–34)
MCHC RBC AUTO-ENTMCNC: 34.1 G/DL (ref 31–37)
MCV RBC AUTO: 90.6 FL (ref 78–100)
METHADONE UR QL: NEGATIVE
MONOCYTES # BLD: 0.5 K/UL (ref 0.05–1.2)
MONOCYTES NFR BLD: 7 % (ref 3–10)
NEUTS SEG # BLD: 2.1 K/UL (ref 1.8–8)
NEUTS SEG NFR BLD: 30 % (ref 40–73)
NRBC # BLD: 0 K/UL (ref 0–0.01)
NRBC BLD-RTO: 0 PER 100 WBC
NT PRO BNP: 2942 PG/ML (ref 0–900)
OPIATES UR QL: NEGATIVE
PCP UR QL: NEGATIVE
PLATELET # BLD AUTO: 216 K/UL (ref 135–420)
PMV BLD AUTO: 9.9 FL (ref 9.2–11.8)
POTASSIUM SERPL-SCNC: 4.1 MMOL/L (ref 3.5–5.5)
RBC # BLD AUTO: 3.5 M/UL (ref 4.35–5.65)
SODIUM SERPL-SCNC: 135 MMOL/L (ref 136–145)
TROPONIN I SERPL HS-MCNC: 36 NG/L (ref 0–78)
WBC # BLD AUTO: 7 K/UL (ref 4.6–13.2)

## 2024-06-09 PROCEDURE — 96375 TX/PRO/DX INJ NEW DRUG ADDON: CPT

## 2024-06-09 PROCEDURE — 6360000002 HC RX W HCPCS

## 2024-06-09 PROCEDURE — 84484 ASSAY OF TROPONIN QUANT: CPT

## 2024-06-09 PROCEDURE — 84443 ASSAY THYROID STIM HORMONE: CPT

## 2024-06-09 PROCEDURE — 96374 THER/PROPH/DIAG INJ IV PUSH: CPT

## 2024-06-09 PROCEDURE — 82962 GLUCOSE BLOOD TEST: CPT

## 2024-06-09 PROCEDURE — 83880 ASSAY OF NATRIURETIC PEPTIDE: CPT

## 2024-06-09 PROCEDURE — 83735 ASSAY OF MAGNESIUM: CPT

## 2024-06-09 PROCEDURE — 93005 ELECTROCARDIOGRAM TRACING: CPT | Performed by: STUDENT IN AN ORGANIZED HEALTH CARE EDUCATION/TRAINING PROGRAM

## 2024-06-09 PROCEDURE — 80307 DRUG TEST PRSMV CHEM ANLYZR: CPT

## 2024-06-09 PROCEDURE — 99285 EMERGENCY DEPT VISIT HI MDM: CPT

## 2024-06-09 PROCEDURE — 83036 HEMOGLOBIN GLYCOSYLATED A1C: CPT

## 2024-06-09 PROCEDURE — 6360000002 HC RX W HCPCS: Performed by: STUDENT IN AN ORGANIZED HEALTH CARE EDUCATION/TRAINING PROGRAM

## 2024-06-09 PROCEDURE — 80048 BASIC METABOLIC PNL TOTAL CA: CPT

## 2024-06-09 PROCEDURE — 71045 X-RAY EXAM CHEST 1 VIEW: CPT

## 2024-06-09 PROCEDURE — 82077 ASSAY SPEC XCP UR&BREATH IA: CPT

## 2024-06-09 PROCEDURE — 85025 COMPLETE CBC W/AUTO DIFF WBC: CPT

## 2024-06-09 RX ORDER — ONDANSETRON 2 MG/ML
4 INJECTION INTRAMUSCULAR; INTRAVENOUS
Status: COMPLETED | OUTPATIENT
Start: 2024-06-09 | End: 2024-06-09

## 2024-06-09 RX ORDER — NALOXONE HYDROCHLORIDE 0.4 MG/ML
0.4 INJECTION, SOLUTION INTRAMUSCULAR; INTRAVENOUS; SUBCUTANEOUS
Status: COMPLETED | OUTPATIENT
Start: 2024-06-09 | End: 2024-06-09

## 2024-06-09 RX ADMIN — NALOXONE HYDROCHLORIDE 0.4 MG: 0.4 INJECTION, SOLUTION INTRAMUSCULAR; INTRAVENOUS; SUBCUTANEOUS at 22:29

## 2024-06-09 RX ADMIN — ONDANSETRON 4 MG: 2 INJECTION INTRAMUSCULAR; INTRAVENOUS at 22:16

## 2024-06-09 ASSESSMENT — PAIN - FUNCTIONAL ASSESSMENT: PAIN_FUNCTIONAL_ASSESSMENT: NONE - DENIES PAIN

## 2024-06-10 ENCOUNTER — APPOINTMENT (OUTPATIENT)
Facility: HOSPITAL | Age: 59
DRG: 917 | End: 2024-06-10
Attending: FAMILY MEDICINE
Payer: COMMERCIAL

## 2024-06-10 ENCOUNTER — APPOINTMENT (OUTPATIENT)
Facility: HOSPITAL | Age: 59
DRG: 917 | End: 2024-06-10
Payer: COMMERCIAL

## 2024-06-10 PROBLEM — I50.21 ACUTE SYSTOLIC HEART FAILURE (HCC): Status: ACTIVE | Noted: 2024-06-10

## 2024-06-10 LAB
AMPHET UR QL SCN: NEGATIVE
BARBITURATES UR QL SCN: NEGATIVE
BENZODIAZ UR QL: NEGATIVE
BUN SERPL-MCNC: 14 MG/DL (ref 7–18)
BUN/CREAT SERPL: 13 (ref 12–20)
CALCIUM SERPL-MCNC: 8.8 MG/DL (ref 8.5–10.1)
CANNABINOIDS UR QL SCN: POSITIVE
CHLORIDE SERPL-SCNC: 101 MMOL/L (ref 100–111)
CO2 SERPL-SCNC: 31 MMOL/L (ref 21–32)
COCAINE UR QL SCN: NEGATIVE
CREAT SERPL-MCNC: 1.08 MG/DL (ref 0.6–1.3)
D DIMER PPP FEU-MCNC: 0.89 UG/ML(FEU)
ECHO AO ASC DIAM: 3.4 CM
ECHO AO ASCENDING AORTA INDEX: 1.66 CM/M2
ECHO AO ROOT DIAM: 3.4 CM
ECHO AO ROOT INDEX: 1.66 CM/M2
ECHO AV AREA PEAK VELOCITY: 2.5 CM2
ECHO AV AREA VTI: 2.7 CM2
ECHO AV AREA/BSA PEAK VELOCITY: 1.2 CM2/M2
ECHO AV AREA/BSA VTI: 1.3 CM2/M2
ECHO AV MEAN GRADIENT: 6 MMHG
ECHO AV MEAN VELOCITY: 1.1 M/S
ECHO AV PEAK GRADIENT: 11 MMHG
ECHO AV PEAK VELOCITY: 1.6 M/S
ECHO AV VELOCITY RATIO: 0.63
ECHO AV VTI: 34.4 CM
ECHO BSA: 2.07 M2
ECHO BSA: 2.07 M2
ECHO LA DIAMETER INDEX: 1.95 CM/M2
ECHO LA DIAMETER: 4 CM
ECHO LA TO AORTIC ROOT RATIO: 1.18
ECHO LA VOL A-L A2C: 49 ML (ref 18–58)
ECHO LA VOL A-L A4C: 66 ML (ref 18–58)
ECHO LA VOL BP: 57 ML (ref 18–58)
ECHO LA VOL MOD A2C: 48 ML (ref 18–58)
ECHO LA VOL MOD A4C: 64 ML (ref 18–58)
ECHO LA VOL/BSA BIPLANE: 28 ML/M2 (ref 16–34)
ECHO LA VOLUME AREA LENGTH: 58 ML
ECHO LA VOLUME INDEX A-L A2C: 24 ML/M2 (ref 16–34)
ECHO LA VOLUME INDEX A-L A4C: 32 ML/M2 (ref 16–34)
ECHO LA VOLUME INDEX AREA LENGTH: 28 ML/M2 (ref 16–34)
ECHO LA VOLUME INDEX MOD A2C: 23 ML/M2 (ref 16–34)
ECHO LA VOLUME INDEX MOD A4C: 31 ML/M2 (ref 16–34)
ECHO LV E' LATERAL VELOCITY: 9 CM/S
ECHO LV E' SEPTAL VELOCITY: 11 CM/S
ECHO LV EDV A2C: 176 ML
ECHO LV EDV A4C: 127 ML
ECHO LV EDV BP: 156 ML (ref 67–155)
ECHO LV EDV INDEX A4C: 62 ML/M2
ECHO LV EDV INDEX BP: 76 ML/M2
ECHO LV EDV NDEX A2C: 86 ML/M2
ECHO LV EJECTION FRACTION A2C: 59 %
ECHO LV EJECTION FRACTION A4C: 47 %
ECHO LV EJECTION FRACTION BIPLANE: 55 % (ref 55–100)
ECHO LV ESV A2C: 71 ML
ECHO LV ESV A4C: 67 ML
ECHO LV ESV BP: 71 ML (ref 22–58)
ECHO LV ESV INDEX A2C: 35 ML/M2
ECHO LV ESV INDEX A4C: 33 ML/M2
ECHO LV ESV INDEX BP: 35 ML/M2
ECHO LV FRACTIONAL SHORTENING: 21 % (ref 28–44)
ECHO LV INTERNAL DIMENSION DIASTOLE INDEX: 2.83 CM/M2
ECHO LV INTERNAL DIMENSION DIASTOLIC: 5.8 CM (ref 4.2–5.9)
ECHO LV INTERNAL DIMENSION SYSTOLIC INDEX: 2.24 CM/M2
ECHO LV INTERNAL DIMENSION SYSTOLIC: 4.6 CM
ECHO LV IVSD: 1.1 CM (ref 0.6–1)
ECHO LV MASS 2D: 264.3 G (ref 88–224)
ECHO LV MASS INDEX 2D: 128.9 G/M2 (ref 49–115)
ECHO LV POSTERIOR WALL DIASTOLIC: 1.1 CM (ref 0.6–1)
ECHO LV RELATIVE WALL THICKNESS RATIO: 0.38
ECHO LVOT AREA: 4.2 CM2
ECHO LVOT AV VTI INDEX: 0.65
ECHO LVOT DIAM: 2.3 CM
ECHO LVOT MEAN GRADIENT: 2 MMHG
ECHO LVOT PEAK GRADIENT: 4 MMHG
ECHO LVOT PEAK VELOCITY: 1 M/S
ECHO LVOT STROKE VOLUME INDEX: 45.2 ML/M2
ECHO LVOT SV: 92.6 ML
ECHO LVOT VTI: 22.3 CM
ECHO MV A VELOCITY: 0.56 M/S
ECHO MV AREA PHT: 4.2 CM2
ECHO MV AREA VTI: 1.7 CM2
ECHO MV E DECELERATION TIME (DT): 192.5 MS
ECHO MV E VELOCITY: 1.4 M/S
ECHO MV E/A RATIO: 2.5
ECHO MV E/E' LATERAL: 15.56
ECHO MV E/E' RATIO (AVERAGED): 14.14
ECHO MV E/E' SEPTAL: 12.73
ECHO MV LVOT VTI INDEX: 2.39
ECHO MV MAX VELOCITY: 1.7 M/S
ECHO MV MEAN GRADIENT: 3 MMHG
ECHO MV MEAN VELOCITY: 0.7 M/S
ECHO MV PEAK GRADIENT: 12 MMHG
ECHO MV PRESSURE HALF TIME (PHT): 52.7 MS
ECHO MV VTI: 53.3 CM
ECHO PV MAX VELOCITY: 1 M/S
ECHO PV PEAK GRADIENT: 4 MMHG
ECHO RA VOLUME: 48 ML
ECHO RA VOLUME: 50 ML
ECHO RV BASAL DIMENSION: 3.1 CM
ECHO RV FREE WALL PEAK S': 15 CM/S
ECHO RV MID DIMENSION: 2.7 CM
ECHO RV TAPSE: 2.6 CM (ref 1.7–?)
ECHO RVOT PEAK GRADIENT: 2 MMHG
ECHO RVOT PEAK VELOCITY: 0.7 M/S
ECHO TV REGURGITANT MAX VELOCITY: 2.51 M/S
ECHO TV REGURGITANT PEAK GRADIENT: 25 MMHG
EKG ATRIAL RATE: 66 BPM
EKG DIAGNOSIS: NORMAL
EKG P AXIS: 64 DEGREES
EKG Q-T INTERVAL: 438 MS
EKG QRS DURATION: 130 MS
EKG QTC CALCULATION (BAZETT): 343 MS
EKG R AXIS: 48 DEGREES
EKG T AXIS: 20 DEGREES
EKG VENTRICULAR RATE: 37 BPM
EST. AVERAGE GLUCOSE BLD GHB EST-MCNC: 157 MG/DL
FOLATE SERPL-MCNC: >20 NG/ML (ref 3.1–17.5)
GLUCOSE BLD STRIP.AUTO-MCNC: 159 MG/DL (ref 70–110)
GLUCOSE BLD STRIP.AUTO-MCNC: 63 MG/DL (ref 70–110)
GLUCOSE BLD STRIP.AUTO-MCNC: 69 MG/DL (ref 70–110)
GLUCOSE BLD STRIP.AUTO-MCNC: 71 MG/DL (ref 70–110)
GLUCOSE BLD STRIP.AUTO-MCNC: 84 MG/DL (ref 70–110)
GLUCOSE BLD STRIP.AUTO-MCNC: 89 MG/DL (ref 70–110)
GLUCOSE BLD STRIP.AUTO-MCNC: 97 MG/DL (ref 70–110)
GLUCOSE SERPL-MCNC: 74 MG/DL (ref 74–99)
HBA1C MFR BLD: 7.1 % (ref 4.2–5.6)
Lab: ABNORMAL
MAGNESIUM SERPL-MCNC: 2.2 MG/DL (ref 1.6–2.6)
METHADONE UR QL: NEGATIVE
OPIATES UR QL: NEGATIVE
PCP UR QL: NEGATIVE
POTASSIUM SERPL-SCNC: 4.2 MMOL/L (ref 3.5–5.5)
SODIUM SERPL-SCNC: 135 MMOL/L (ref 136–145)
TROPONIN I SERPL HS-MCNC: 40 NG/L (ref 0–78)
TROPONIN I SERPL HS-MCNC: 44 NG/L (ref 0–78)
TSH SERPL DL<=0.05 MIU/L-ACNC: 3.46 UIU/ML (ref 0.36–3.74)
VIT B12 SERPL-MCNC: 1183 PG/ML (ref 211–911)

## 2024-06-10 PROCEDURE — 97161 PT EVAL LOW COMPLEX 20 MIN: CPT

## 2024-06-10 PROCEDURE — 36415 COLL VENOUS BLD VENIPUNCTURE: CPT

## 2024-06-10 PROCEDURE — 85379 FIBRIN DEGRADATION QUANT: CPT

## 2024-06-10 PROCEDURE — 93306 TTE W/DOPPLER COMPLETE: CPT

## 2024-06-10 PROCEDURE — 93010 ELECTROCARDIOGRAM REPORT: CPT | Performed by: INTERNAL MEDICINE

## 2024-06-10 PROCEDURE — 93970 EXTREMITY STUDY: CPT

## 2024-06-10 PROCEDURE — 97165 OT EVAL LOW COMPLEX 30 MIN: CPT

## 2024-06-10 PROCEDURE — 2700000000 HC OXYGEN THERAPY PER DAY

## 2024-06-10 PROCEDURE — 80307 DRUG TEST PRSMV CHEM ANLYZR: CPT

## 2024-06-10 PROCEDURE — 6370000000 HC RX 637 (ALT 250 FOR IP): Performed by: FAMILY MEDICINE

## 2024-06-10 PROCEDURE — 80048 BASIC METABOLIC PNL TOTAL CA: CPT

## 2024-06-10 PROCEDURE — 84484 ASSAY OF TROPONIN QUANT: CPT

## 2024-06-10 PROCEDURE — 94761 N-INVAS EAR/PLS OXIMETRY MLT: CPT

## 2024-06-10 PROCEDURE — 83735 ASSAY OF MAGNESIUM: CPT

## 2024-06-10 PROCEDURE — 82962 GLUCOSE BLOOD TEST: CPT

## 2024-06-10 PROCEDURE — 2580000003 HC RX 258: Performed by: FAMILY MEDICINE

## 2024-06-10 PROCEDURE — 82607 VITAMIN B-12: CPT

## 2024-06-10 PROCEDURE — 99255 IP/OBS CONSLTJ NEW/EST HI 80: CPT | Performed by: INTERNAL MEDICINE

## 2024-06-10 PROCEDURE — 2140000001 HC CVICU INTERMEDIATE R&B

## 2024-06-10 PROCEDURE — 70450 CT HEAD/BRAIN W/O DYE: CPT

## 2024-06-10 PROCEDURE — 82746 ASSAY OF FOLIC ACID SERUM: CPT

## 2024-06-10 RX ORDER — CLOPIDOGREL BISULFATE 75 MG/1
75 TABLET ORAL DAILY
Status: DISCONTINUED | OUTPATIENT
Start: 2024-06-10 | End: 2024-06-11 | Stop reason: HOSPADM

## 2024-06-10 RX ORDER — ASPIRIN 81 MG/1
81 TABLET, CHEWABLE ORAL DAILY
Status: DISCONTINUED | OUTPATIENT
Start: 2024-06-10 | End: 2024-06-11 | Stop reason: HOSPADM

## 2024-06-10 RX ORDER — DEXTROSE MONOHYDRATE 100 MG/ML
INJECTION, SOLUTION INTRAVENOUS CONTINUOUS PRN
Status: DISCONTINUED | OUTPATIENT
Start: 2024-06-10 | End: 2024-06-11 | Stop reason: HOSPADM

## 2024-06-10 RX ORDER — INSULIN LISPRO 100 [IU]/ML
0-4 INJECTION, SOLUTION INTRAVENOUS; SUBCUTANEOUS NIGHTLY
Status: DISCONTINUED | OUTPATIENT
Start: 2024-06-10 | End: 2024-06-11 | Stop reason: HOSPADM

## 2024-06-10 RX ORDER — AMLODIPINE BESYLATE 5 MG/1
5 TABLET ORAL DAILY
Status: DISCONTINUED | OUTPATIENT
Start: 2024-06-10 | End: 2024-06-11

## 2024-06-10 RX ORDER — INSULIN LISPRO 100 [IU]/ML
0-4 INJECTION, SOLUTION INTRAVENOUS; SUBCUTANEOUS
Status: DISCONTINUED | OUTPATIENT
Start: 2024-06-10 | End: 2024-06-11 | Stop reason: HOSPADM

## 2024-06-10 RX ORDER — HYDRALAZINE HYDROCHLORIDE 20 MG/ML
20 INJECTION INTRAMUSCULAR; INTRAVENOUS EVERY 6 HOURS PRN
Status: DISCONTINUED | OUTPATIENT
Start: 2024-06-10 | End: 2024-06-11 | Stop reason: HOSPADM

## 2024-06-10 RX ADMIN — ASPIRIN 81 MG CHEWABLE TABLET 81 MG: 81 TABLET CHEWABLE at 08:34

## 2024-06-10 RX ADMIN — CLOPIDOGREL BISULFATE 75 MG: 75 TABLET ORAL at 17:02

## 2024-06-10 RX ADMIN — DEXTROSE MONOHYDRATE 125 ML: 100 INJECTION, SOLUTION INTRAVENOUS at 16:20

## 2024-06-10 RX ADMIN — DEXTROSE MONOHYDRATE 125 ML: 100 INJECTION, SOLUTION INTRAVENOUS at 12:28

## 2024-06-10 ASSESSMENT — PAIN SCALES - GENERAL
PAINLEVEL_OUTOF10: 0

## 2024-06-10 NOTE — ED TRIAGE NOTES
Patient comes in by EMS after being found unresponsive by family. Patient admits to heroin use. No Narcan was given. Patient vomiting on arrival.

## 2024-06-10 NOTE — ED PROVIDER NOTES
I reviewed the patient's previous records here at [facility] and available outside facilities via care everywhere.      - INDEPENDENT HISTORIAN:  History and/or plan development assisted by: patient and EMS    - Severe exacerbation or progression of chronic illness: CAD and second-degree heart block type I     - Comorbidities impacting Evaluation and Management: hypertension, HLD    - SOCIAL DETERMINANTS  impacting Evaluation and Management: substance use      Procedures       Final Diagnosis     1. Acute systolic heart failure (HCC)    2. Accidental drug overdose, initial encounter    3. Second degree AV block, Mobitz type I    4. Coronary artery disease involving native coronary artery of native heart without angina pectoris          Disposition   Patient verbalized understanding and is in agreement with the plan of care.  All questions answered.    Disposition: Admission to SDU    Yanteh Hillman DO  Emergency Medicine PGY-2  [facility]  [place] 10, 2024  2:17 AM    Patient seen with Attending, Dr. Zamorano      Please note that portions of this note were completed with a voice recognition program.  Efforts were made to edit the dictations but occasionally words are mis-transcribed.

## 2024-06-10 NOTE — ED NOTES
Patient received from evening nurse noted to have SpO2 90% and heart rate 39 restarted on oxygen patient's SpO2 and HR improved once awake will continue to observe closely.

## 2024-06-10 NOTE — H&P
History & Physical    Patient: Joseph Santos MRN: [id number]  CSN: [id number]    YOB: 1965  Age: 58 y.o.  Sex: male      DOA: 6/9/2024    Chief Complaint:   Chief Complaint   Patient presents with    Drug Overdose          HPI:     Joseph Santos is a 58 y.o.  male who has history of hypertension hyperlipidemia coronary artery disease second-degree heart block diabetes mellitus type 2 peripheral neuropathy kidney stones restless leg syndrome sleep apnea chronic kidney disease stage III yea    Patient presented to the ER yesterday via EMS for drug overdose patient admitted to use morphine  from stepson is not sure how he got the morphine . UDS was negative for morphine positive THC    Patient lost consciousness and was brought to the ER by EMS initial evaluation in ER  heart rate was in the 30s and 40s cardiology Dr. Riley was consulted by the ER patient received Narcan 0.4 mg IV 1 dose.  Patient was found to have pulmonary edema per ER physician  and elevated proBNP.  Dr. Riley recommended to hold on Lasix until his heart rate improved.  Patient was found to be hypoxic started on O2 by nasal cannula 4 L/min with good response once he  got off the oxygen his heart rate dropped down to 40' s range range    Patient was admitted for further evaluation and treatment    Initial lab work in the ER  White blood cells 7 H&H 10.8/31.7 platelet 216  Sodium 135 potassium 4.1 creatinine was elevated 1.47  proBNP 2942  Troponin 36  TSH was not done ordered this morning  Urine drug screen positive for THC    EKG    Sinus rhythm with 2nd degree AV block (Mobitz I) with 2:1 AV conduction with  premature supraventricular complexes  Nonspecific intraventricular block  Nonspecific T wave abnormality  Abnormal ECG  When compared with ECG of 07-APR-2023 09:35,  premature supraventricular complexes are now present  Nonspecific T wave abnormality now evident in Anterior leads  QT has shortened

## 2024-06-10 NOTE — CONSULTS
[facility] - Consult Note    Cardiology consultation request from ED MD for evaluation and management/treatment of bradycardia.    Date of  Admission: 6/9/2024  9:22 PM   Primary Care Physician:  Darien Ross MD    Attending Cardiologist: Dr. Riley        Assessment:     Patient Active Problem List    Diagnosis Date Noted    Acute systolic heart failure (HCC) 06/10/2024    Cubital tunnel syndrome, left 06/29/2021    Cubital tunnel syndrome, right 05/18/2021    Second degree AV block, Mobitz type I     Type 2 diabetes mellitus with diabetic neuropathy (HCC) 08/20/2019    Microalbuminuria due to type 2 diabetes mellitus (HCC) 08/03/2018    Severe single current episode of major depressive disorder, without psychotic features (HCC) 12/29/2017    Hyperlipidemia 03/24/2017    Mild nonproliferative diabetic retinopathy of both eyes ([facility]) 05/10/2016    Coronary artery disease involving native coronary artery of native heart without angina pectoris 04/27/2016    Essential hypertension with goal blood pressure less than 140/90 04/27/2016    Primary osteoarthritis involving multiple joints 01/29/2016    Type 2 diabetes mellitus with peripheral neuropathy (HCC) 08/03/2015    Second degree AV block 01/30/2012    Ischemic cardiomyopathy 06/28/2011     - Drug overdose with loss of consciousness: primary reason for admission. Received Narcan in ED.   - Second degree AVB, Mobitz type I: patient with known history of. Remains asymptomatic. EKG on admission shows second degree AVB Mobitz 1 with 2:1 AV conduction.  - History of CAD: known occluded RCA noted back in 1998 with in-stent restenosis. Underwent rotational arthrectomy in 1999 but subsequently had restenosis again. On aspirin, plavix, statin. No BB due to above.   Last stress test 4/7/23: showed EF 43% with fixed inferior defect, no significant reversible defect was noted   - Ischemic CMO: last echo 4/7/23 EF 40-45%, global hypokinesis present  -

## 2024-06-11 ENCOUNTER — APPOINTMENT (OUTPATIENT)
Facility: HOSPITAL | Age: 59
DRG: 917 | End: 2024-06-11
Payer: COMMERCIAL

## 2024-06-11 VITALS
OXYGEN SATURATION: 96 % | DIASTOLIC BLOOD PRESSURE: 79 MMHG | SYSTOLIC BLOOD PRESSURE: 174 MMHG | HEART RATE: 54 BPM | RESPIRATION RATE: 18 BRPM | HEIGHT: 71 IN | WEIGHT: 188 LBS | BODY MASS INDEX: 26.32 KG/M2 | TEMPERATURE: 98.3 F

## 2024-06-11 PROBLEM — R55 SYNCOPE AND COLLAPSE: Status: ACTIVE | Noted: 2024-06-11

## 2024-06-11 LAB
ALBUMIN SERPL-MCNC: 2.7 G/DL (ref 3.4–5)
ALBUMIN/GLOB SERPL: 0.9 (ref 0.8–1.7)
ALP SERPL-CCNC: 51 U/L (ref 45–117)
ALT SERPL-CCNC: 33 U/L (ref 16–61)
ANION GAP SERPL CALC-SCNC: 6 MMOL/L (ref 3–18)
AST SERPL-CCNC: 38 U/L (ref 10–38)
BASOPHILS # BLD: 0.1 K/UL (ref 0–0.1)
BASOPHILS NFR BLD: 1 % (ref 0–2)
BILIRUB SERPL-MCNC: 0.9 MG/DL (ref 0.2–1)
BUN SERPL-MCNC: 13 MG/DL (ref 7–18)
BUN/CREAT SERPL: 14 (ref 12–20)
CALCIUM SERPL-MCNC: 8.2 MG/DL (ref 8.5–10.1)
CHLORIDE SERPL-SCNC: 97 MMOL/L (ref 100–111)
CHOLEST SERPL-MCNC: 72 MG/DL
CO2 SERPL-SCNC: 28 MMOL/L (ref 21–32)
CREAT SERPL-MCNC: 0.94 MG/DL (ref 0.6–1.3)
DIFFERENTIAL METHOD BLD: ABNORMAL
EOSINOPHIL # BLD: 0.2 K/UL (ref 0–0.4)
EOSINOPHIL NFR BLD: 4 % (ref 0–5)
ERYTHROCYTE [DISTWIDTH] IN BLOOD BY AUTOMATED COUNT: 13.8 % (ref 11.6–14.5)
GLOBULIN SER CALC-MCNC: 2.9 G/DL (ref 2–4)
GLUCOSE BLD STRIP.AUTO-MCNC: 129 MG/DL (ref 70–110)
GLUCOSE BLD STRIP.AUTO-MCNC: 140 MG/DL (ref 70–110)
GLUCOSE BLD STRIP.AUTO-MCNC: 163 MG/DL (ref 70–110)
GLUCOSE SERPL-MCNC: 118 MG/DL (ref 74–99)
HDLC SERPL-MCNC: 26 MG/DL (ref 40–60)
HDLC SERPL: 2.8 (ref 0–5)
HGB BLD-MCNC: 10.6 G/DL (ref 13–16)
IMM GRANULOCYTES # BLD AUTO: 0 K/UL (ref 0–0.04)
IMM GRANULOCYTES NFR BLD AUTO: 0 % (ref 0–0.5)
IRON SATN MFR SERPL: 12 % (ref 20–50)
IRON SERPL-MCNC: 35 UG/DL (ref 50–175)
LDLC SERPL CALC-MCNC: 27 MG/DL (ref 0–100)
LYMPHOCYTES # BLD: 2 K/UL (ref 0.9–3.6)
LYMPHOCYTES NFR BLD: 38 % (ref 21–52)
MAGNESIUM SERPL-MCNC: 1.9 MG/DL (ref 1.6–2.6)
MCH RBC QN AUTO: 29.9 PG (ref 24–34)
MCHC RBC AUTO-ENTMCNC: 33.9 G/DL (ref 31–37)
MCV RBC AUTO: 88.4 FL (ref 78–100)
MONOCYTES # BLD: 0.3 K/UL (ref 0.05–1.2)
MONOCYTES NFR BLD: 7 % (ref 3–10)
NEUTS SEG # BLD: 2.6 K/UL (ref 1.8–8)
NEUTS SEG NFR BLD: 51 % (ref 40–73)
NRBC # BLD: 0 K/UL (ref 0–0.01)
NRBC BLD-RTO: 0 PER 100 WBC
PLATELET # BLD AUTO: 203 K/UL (ref 135–420)
PMV BLD AUTO: 10.2 FL (ref 9.2–11.8)
POTASSIUM SERPL-SCNC: 3.8 MMOL/L (ref 3.5–5.5)
PROT SERPL-MCNC: 5.6 G/DL (ref 6.4–8.2)
RBC # BLD AUTO: 3.54 M/UL (ref 4.35–5.65)
SODIUM SERPL-SCNC: 131 MMOL/L (ref 136–145)
TIBC SERPL-MCNC: 282 UG/DL (ref 250–450)
TRIGL SERPL-MCNC: 95 MG/DL
VLDLC SERPL CALC-MCNC: 19 MG/DL
WBC # BLD AUTO: 5.2 K/UL (ref 4.6–13.2)

## 2024-06-11 PROCEDURE — 94761 N-INVAS EAR/PLS OXIMETRY MLT: CPT

## 2024-06-11 PROCEDURE — 80053 COMPREHEN METABOLIC PANEL: CPT

## 2024-06-11 PROCEDURE — 99232 SBSQ HOSP IP/OBS MODERATE 35: CPT | Performed by: INTERNAL MEDICINE

## 2024-06-11 PROCEDURE — 36415 COLL VENOUS BLD VENIPUNCTURE: CPT

## 2024-06-11 PROCEDURE — 82962 GLUCOSE BLOOD TEST: CPT

## 2024-06-11 PROCEDURE — 80061 LIPID PANEL: CPT

## 2024-06-11 PROCEDURE — 6370000000 HC RX 637 (ALT 250 FOR IP): Performed by: FAMILY MEDICINE

## 2024-06-11 PROCEDURE — 6370000000 HC RX 637 (ALT 250 FOR IP)

## 2024-06-11 PROCEDURE — 6360000004 HC RX CONTRAST MEDICATION: Performed by: STUDENT IN AN ORGANIZED HEALTH CARE EDUCATION/TRAINING PROGRAM

## 2024-06-11 PROCEDURE — 85025 COMPLETE CBC W/AUTO DIFF WBC: CPT

## 2024-06-11 PROCEDURE — 71275 CT ANGIOGRAPHY CHEST: CPT

## 2024-06-11 PROCEDURE — 6370000000 HC RX 637 (ALT 250 FOR IP): Performed by: STUDENT IN AN ORGANIZED HEALTH CARE EDUCATION/TRAINING PROGRAM

## 2024-06-11 PROCEDURE — 83540 ASSAY OF IRON: CPT

## 2024-06-11 PROCEDURE — 99221 1ST HOSP IP/OBS SF/LOW 40: CPT | Performed by: INTERNAL MEDICINE

## 2024-06-11 PROCEDURE — 83735 ASSAY OF MAGNESIUM: CPT

## 2024-06-11 PROCEDURE — 83550 IRON BINDING TEST: CPT

## 2024-06-11 RX ORDER — PREGABALIN 50 MG/1
100 CAPSULE ORAL 3 TIMES DAILY
Status: DISCONTINUED | OUTPATIENT
Start: 2024-06-11 | End: 2024-06-11 | Stop reason: HOSPADM

## 2024-06-11 RX ORDER — FERROUS SULFATE 325(65) MG
325 TABLET ORAL
Status: DISCONTINUED | OUTPATIENT
Start: 2024-06-12 | End: 2024-06-11 | Stop reason: HOSPADM

## 2024-06-11 RX ORDER — HYDROCHLOROTHIAZIDE 25 MG/1
25 TABLET ORAL DAILY
Status: DISCONTINUED | OUTPATIENT
Start: 2024-06-11 | End: 2024-06-11 | Stop reason: HOSPADM

## 2024-06-11 RX ORDER — FAMOTIDINE 20 MG/1
20 TABLET, FILM COATED ORAL 2 TIMES DAILY
Status: DISCONTINUED | OUTPATIENT
Start: 2024-06-11 | End: 2024-06-11 | Stop reason: HOSPADM

## 2024-06-11 RX ORDER — LOSARTAN POTASSIUM 50 MG/1
50 TABLET ORAL DAILY
Status: DISCONTINUED | OUTPATIENT
Start: 2024-06-11 | End: 2024-06-11 | Stop reason: HOSPADM

## 2024-06-11 RX ORDER — AMLODIPINE BESYLATE 10 MG/1
10 TABLET ORAL DAILY
Status: DISCONTINUED | OUTPATIENT
Start: 2024-06-11 | End: 2024-06-11 | Stop reason: HOSPADM

## 2024-06-11 RX ORDER — FERROUS SULFATE 325(65) MG
325 TABLET ORAL
Qty: 30 TABLET | Refills: 3 | Status: SHIPPED | OUTPATIENT
Start: 2024-06-12

## 2024-06-11 RX ORDER — ACETAMINOPHEN 500 MG
1000 TABLET ORAL ONCE
Status: COMPLETED | OUTPATIENT
Start: 2024-06-11 | End: 2024-06-11

## 2024-06-11 RX ADMIN — IOPAMIDOL 80 ML: 755 INJECTION, SOLUTION INTRAVENOUS at 11:34

## 2024-06-11 RX ADMIN — FAMOTIDINE 20 MG: 20 TABLET ORAL at 09:04

## 2024-06-11 RX ADMIN — HYDROCHLOROTHIAZIDE 25 MG: 25 TABLET ORAL at 14:48

## 2024-06-11 RX ADMIN — AMLODIPINE BESYLATE 10 MG: 10 TABLET ORAL at 09:05

## 2024-06-11 RX ADMIN — LOSARTAN POTASSIUM 50 MG: 50 TABLET, FILM COATED ORAL at 14:48

## 2024-06-11 RX ADMIN — ASPIRIN 81 MG CHEWABLE TABLET 81 MG: 81 TABLET CHEWABLE at 09:04

## 2024-06-11 RX ADMIN — PREGABALIN 100 MG: 50 CAPSULE ORAL at 10:34

## 2024-06-11 RX ADMIN — CLOPIDOGREL BISULFATE 75 MG: 75 TABLET ORAL at 09:04

## 2024-06-11 RX ADMIN — ACETAMINOPHEN 1000 MG: 500 TABLET ORAL at 10:34

## 2024-06-11 RX ADMIN — PREGABALIN 100 MG: 50 CAPSULE ORAL at 14:47

## 2024-06-11 ASSESSMENT — PAIN SCALES - GENERAL
PAINLEVEL_OUTOF10: 0

## 2024-06-11 NOTE — CARE COORDINATION
D/C order noted for today. Orders reviewed. No needs identified at this time. CM remains available if needed.             Darby Bradley, [phone]

## 2024-06-11 NOTE — PROGRESS NOTES
Progress Note    Patient: Joseph Santos MRN: [id number]  SSN: [id number]    YOB: 1965  Age: 58 y.o.  Sex: male      Admit Date: 6/9/2024    LOS: 1 day     Subjective:   Patient feeling well, no complaints. Had bradycardic episodes overnight on telemetry in 30's asymptomatic. HR however much improved overall. Patient anxious to leave the hospital.     Dimer elevated 0.89; venous duplex neg DVT. Will get CTA rule out PE definitively.     BP slightly elevated. Will increase amlodipine to 10mg daily.     Patient with AV block mobitz type 1 with 2:1 AV conduction.   Echo completed showed EF of 50-55%.    Appreciate Cardiology recommendations. No interventions at this time, continue to monitor on telemetry.  Holding any antichronotropic medications. Heart rate down still michela intermittently.     Follow up recommendations Cardiology. Potential discharge home later today if BP controlled and cardiology clears and CTA negative.     B12, Folic acid wnl; will check iron, TIBC;  hemoglobin stable  PT and OT evaluated, no further PT needs  CT head neg for acute findings  UDS positive for THC only    Echo:  6/10/24:  Interpretation Summary         Left Ventricle: Low normal left ventricular systolic function with a visually estimated EF of 50 - 55%. Left ventricle size is normal. Normal wall thickness. Normal wall motion.    Mitral Valve: Mildly thickened leaflet. Mild annular calcification of the mitral valve.    Image quality is adequate.    CT Head: 6/10/24  IMPRESSION:  1.  No acute intracranial abnormalities.      Duplex Venous LE: 6/10/24  Interpretation Summary         No evidence of deep vein thrombosis in the right lower extremity.    No evidence of deep vein thrombosis in the left lower extremity.    Bilateral posterior tibial artery Doppler waveforms are multiphasic.  Objective:     Vitals:    06/10/24 2015 06/11/24 0000 06/11/24 0400 06/11/24 0725   BP:  137/70 (!) 152/77 (!) 168/70   Pulse: 58  
 conducted an initial consultation and Spiritual Assessment for Joseph Santos, who is a 58 y.o.,male. Patient’s Primary Language is: English.   According to the patient’s EMR Worship Affiliation is: Gnosticism.     The reason the Patient came to the hospital is:   Patient Active Problem List    Diagnosis Date Noted    Acute systolic heart failure (HCC) 06/10/2024    Cubital tunnel syndrome, left 06/29/2021    Cubital tunnel syndrome, right 05/18/2021    Second degree AV block, Mobitz type I     Type 2 diabetes mellitus with diabetic neuropathy ([facility]) 08/20/2019    Microalbuminuria due to type 2 diabetes mellitus (HCC) 08/03/2018    Severe single current episode of major depressive disorder, without psychotic features ([facility]) 12/29/2017    Hyperlipidemia 03/24/2017    Mild nonproliferative diabetic retinopathy of both eyes ([facility]) 05/10/2016    Coronary artery disease involving native coronary artery of native heart without angina pectoris 04/27/2016    Essential hypertension with goal blood pressure less than 140/90 04/27/2016    Primary osteoarthritis involving multiple joints 01/29/2016    Type 2 diabetes mellitus with peripheral neuropathy (HCC) 08/03/2015    Second degree AV block 01/30/2012    Ischemic cardiomyopathy 06/28/2011        The  provided the following Interventions:  Initiated a relationship of care and support with patient in bed 2312 where he has been for the last 8 hours. Patient was sleeping till I knocked on the door. Listened to his story of being here and how he is feeling.  Patient shared that he is active in a local Taoism and his kiran will see him through this,  There is no advance directive on file.    The following outcomes were achieved:  Patient shared limited information about both their medical narrative and spiritual journey/beliefs.  Patient processed feeling about current hospitalization.  Patient expressed gratitude for pastoral care 
1320 Attending paged, patient is NPO and inquiring about eating. Patient BS 97 last checked.    1600 Patient tolerated meds and meal tray. Do distress noted.  
1700 Patient  provided with discharge education on medication and discharged with wife.  
Bedside and Verbal shift change report given to Crissy RN & Evelyn RN by Winnie SCHROEDER. Report included the following information Nurse Handoff Report, Intake/Output, MAR, Recent Results, and Cardiac Rhythm second degree AV block .     
[facility] - Progress Note    Admit Date: 6/9/2024  Attending Cardiologist: Dr. Riley    Assessment:     Patient Active Problem List    Diagnosis Date Noted    Acute systolic heart failure (HCC) 06/10/2024    Cubital tunnel syndrome, left 06/29/2021    Cubital tunnel syndrome, right 05/18/2021    Second degree AV block, Mobitz type I     Type 2 diabetes mellitus with diabetic neuropathy (HCC) 08/20/2019    Microalbuminuria due to type 2 diabetes mellitus (HCC) 08/03/2018    Severe single current episode of major depressive disorder, without psychotic features (HCC) 12/29/2017    Hyperlipidemia 03/24/2017    Mild nonproliferative diabetic retinopathy of both eyes ([facility]) 05/10/2016    Coronary artery disease involving native coronary artery of native heart without angina pectoris 04/27/2016    Essential hypertension with goal blood pressure less than 140/90 04/27/2016    Primary osteoarthritis involving multiple joints 01/29/2016    Type 2 diabetes mellitus with peripheral neuropathy (HCC) 08/03/2015    Second degree AV block 01/30/2012    Ischemic cardiomyopathy 06/28/2011       - Drug overdose with loss of consciousness: primary reason for admission. Received Narcan in ED.   - Second degree AVB, Mobitz type I: patient with known history of. Remains asymptomatic. EKG on admission shows second degree AVB Mobitz 1 with 2:1 AV conduction.  - History of CAD: known occluded RCA noted back in 1998 with in-stent restenosis. Underwent rotational arthrectomy in 1999 but subsequently had restenosis again. On aspirin, plavix, statin. No BB due to above.   Last stress test 4/7/23: showed EF 43% with fixed inferior defect, no significant reversible defect was noted   - Ischemic CMO:   Echo 4/7/23 EF 40-45%, global hypokinesis present  Echo 6/10/24 EF 50-55%, normal wall motion  - HTN: takes norvasc, hctz as outpatient   - HLD: on statin   - FABY  - T2DM     Primary cardiologist: Dr. Osvaldo KIRKLAND     Plan: 
PHYSICAL THERAPY EVALUATION/DISCHARGE    Patient: Joseph Santos (58 y.o. male)  Date: 6/10/2024  Primary Diagnosis: Acute systolic heart failure (HCC) [I50.21]  Second degree AV block, Mobitz type I [I44.1]  Accidental drug overdose, initial encounter [T50.061A]  Coronary artery disease involving native coronary artery of native heart without angina pectoris [I25.10]       Precautions: General Precautions,  ,  ,  ,  ,  ,  ,    PLOF: lives with family in 1 level home with 3 [address], independent at baseline     ASSESSMENT AND RECOMMENDATIONS:  Patient seen for PT evaluation. Received supine; agreeable. Presenting with mild chronic low back pain. Otherwise able to complete all aspects of functional mobility independently without use of AD. LE strength and balance WFL. No further acute PT needs identified. PT to sign off.  Patient does not require further skilled physical therapy intervention at this level of care.    Further Equipment Recommendations for Discharge: none    AMPA: AM-PAC Inpatient Mobility Raw Score : 24      At this time and based on an AM-PAC score, no further PT is recommended upon discharge.  Recommend patient returns to prior setting with prior services.    This [facility] score should be considered in conjunction with interdisciplinary team recommendations to determine the most appropriate discharge setting. Patient's social support, diagnosis, medical stability, and prior level of function should also be taken into consideration.     SUBJECTIVE:   Patient stated “I'm doing much better.”    OBJECTIVE DATA SUMMARY:     Past Medical History:   Diagnosis Date    Abnormal nuclear cardiac imaging test 01/31/2012    Mod inferior infarction w/mild-mod bright-infarct ischemia.  LVE.  EF 43%.  Marked basal inferior hypk; otherwise mild-mod inferior, inferolateral, distal anteroapical hypk.  TID index 1.07.  Pos max EST suggests subtotal obstruction of RCA.      Arthritis     Calculus of kidney     Chronic pain  
TRANSFER - IN REPORT:    Verbal report received from ALEXANDRE Vazquez on Joseph Santos  being received from ED for routine progression of patient care      Report consisted of patient's Situation, Background, Assessment and   Recommendations(SBAR).     Information from the following report(s) Nurse Handoff Report, Recent Results, and Cardiac Rhythm second degree AV block  was reviewed with the receiving nurse.    Opportunity for questions and clarification was provided.      Assessment completed upon patient's arrival to unit and care assumed.        Patient arrived on unit accompanied by RN. He is A&0x4.     0500 called to make Dr Ross aware that patient has no orders or code status at this time. He reports ok to put in full code order and keep patient NPO. Chart is updated at this time.  
native coronary artery     Inferior MI in 1998 status post RCA stent; ISR in 1999 status post rotational atherectomy. Posterior wall MI 2002 with circumflex stent.    Depression     Dyslipidemia     History of echocardiogram 10/09/2012    EF 50-55%.  No WMA.  Gr 1 DDfx.  RVSP normal.  LAE.  IVCE.      Hyperlipidemia     Hypertension     mild    Long term current use of anticoagulant therapy     Neuropathy     Renal artery stenosis (HCC) 12/02/2013    No significant RA stenosis.  Patent bilateral renal veins w/o thrombosis.      S/P cardiac cath 02/17/2012    %.  ISR.  LM patent.  LAD patent.  pD1 55%.  mCX patent.  OM1 80% (3 x 30 Yorklyn stent o/lapped w/3 x 12 Yorklyn stent; residual 0%).  LVEDP 13.  EF 50-55%.      S/P coronary angioplasty 10/08/2012    Unsuccessful angioplasty of chronic RCA occlusion w/collaterals.    Second degree AV block, Mobitz type I     Chronic/recurrent    Testicular torsion      Past Surgical History:   Procedure Laterality Date    APPENDECTOMY      COLONOSCOPY N/A 4/24/2019    COLONOSCOPY performed by Avel Pace MD at [facility]    HERNIA REPAIR      ORTHOPEDIC SURGERY  1984    Fx C4-5    DC UNLISTED PROCEDURE CARDIAC SURGERY      stents x3    UPPER ARM/ELBOW SURGERY UNLISTED Left     UPPER ARM/ELBOW SURGERY UNLISTED Right     UROLOGICAL SURGERY Right     kidney stone    UROLOGICAL SURGERY Bilateral     orchiopexy for torsion     Home Situation:   Social/Functional History  Lives With: Spouse, Family  Type of Home: House  Home Layout: One level  Home Access: Stairs to enter with rails  Entrance Stairs - Number of Steps: 3  Bathroom Shower/Tub: Tub/Shower unit  Bathroom Toilet: Handicap height  ADL Assistance: Independent  Homemaking Assistance: Independent  []  Right hand dominant   []  Left hand dominant    Cognitive/Behavioral Status:  Orientation  Overall Orientation Status: Within Normal Limits  Orientation Level: Oriented X4  Cognition  Overall Cognitive

## 2024-06-11 NOTE — CARE COORDINATION
CM spoke with patient, updated on discharge order for today.   No CM needs at this time.   Patient's wife to transport patient home today for discharge.             Darby Bradley RN  Case Management [phone]

## 2024-06-11 NOTE — CONSULTS
[facility].  Pulmonary, Critical Care, and Sleep Medicine    Initial Patient Consult    Name: Joseph Santos MRN: [id number]   : 1965 Hospital: [facility]   Date: 2024        IMPRESSION:   Possible syncopal episode  - bradycardic in the 30s and 40s on presentation  - known 2nd degree AVB  - hx of narcotic use  Hx of CAD  Hx of HTN  Hx of FABY     Patient Active Problem List   Diagnosis    Type 2 diabetes mellitus with peripheral neuropathy (HCC)    Second degree AV block    Hyperlipidemia    Second degree AV block, Mobitz type I    Microalbuminuria due to type 2 diabetes mellitus (HCC)    Coronary artery disease involving native coronary artery of native heart without angina pectoris    Mild nonproliferative diabetic retinopathy of both eyes ([facility])    Ischemic cardiomyopathy    Severe single current episode of major depressive disorder, without psychotic features ([facility])    Cubital tunnel syndrome, left    Essential hypertension with goal blood pressure less than 140/90    Type 2 diabetes mellitus with diabetic neuropathy (HCC)    Primary osteoarthritis involving multiple joints    Cubital tunnel syndrome, right    Acute systolic heart failure ([facility])      RECOMMENDATIONS:   CTA negative but read as possible thrombus vs artifact in the descending R PA. I have very low suspicion this patient had a PE leading to syncope based on negative dopplers, normal echo, no hypoxia, no tachycardia and elevated blood pressure. He also does not appear to have any risk factors for PE. I do not think anticoagulation would be justified in this patient.     Okay to d/c from my standpoint.      Subjective:     This patient has been seen and evaluated at the request of Dr. Rneee for syncope. Patient is a 58 y.o. male with PMHx of HTN, CKD and FABY who presented to the ED for unresponsiveness. He was reportedly taking morphine and was given narcan with some response. He was very bradycardic

## 2024-06-11 NOTE — CARE COORDINATION
06/11/24 1354   Service Assessment   Patient Orientation Alert and Oriented;Person;Place;Situation;Self   Cognition Alert   History Provided By Patient   Primary Caregiver Self   Accompanied By/Relationship No one is currently at the bedside.   Support Systems Spouse/Significant Other;Parent   Patient's Healthcare Decision Maker is:   (Not applicable.)   PCP Verified by CM Yes   Last Visit to PCP Within last 3 months   Prior Functional Level Independent in ADLs/IADLs   Current Functional Level Independent in ADLs/IADLs   Can patient return to prior living arrangement Yes   Ability to make needs known: Good   Family able to assist with home care needs: Yes   Would you like for me to discuss the discharge plan with any other family members/significant others, and if so, who? Yes  (Patient's wife, listed in patient's contact list.)   Financial Resources None   Community Resources None   CM/SW Referral Other (see comment)  (Discharge planning.)   Social/Functional History   Lives With Spouse;Parent  (Patient lives with his wife, and his mother.)   Type of Home House   Home Layout One level   Home Access Stairs to enter with rails   Entrance Stairs - Number of Steps 3 Steps to enter the home.   Entrance Stairs - Rails Both   Bathroom Shower/Tub Tub/Shower unit   Bathroom Toilet Handicap height   Bathroom Equipment None   Bathroom Accessibility Accessible   Home Equipment None   Receives Help From Family   ADL Assistance Independent   Homemaking Assistance Independent   Homemaking Responsibilities Yes   Ambulation Assistance Independent   Transfer Assistance Independent   Active  Yes   Mode of Transportation Car   Education   (Not applicable.)   Occupation Unemployed   Type of Occupation   (Patient is not currently working at this time.)   Discharge Planning   Living Arrangements Spouse/Significant Other;Parent  (Patient lives with his wife, and his mother.)   Current Services Prior To Admission None   Potential

## 2024-06-11 NOTE — DISCHARGE SUMMARY
Cardiology [phone] [address] [facility] [address]     Next Steps: Follow up in 2 week(s)           Time for discharge > 45 minutes      Signed By: Ladarius Reene MD     June 11, 2024

## 2024-06-11 NOTE — PLAN OF CARE
Problem: Discharge Planning  Goal: Discharge to home or other facility with appropriate resources  6/11/2024 1608 by Winnie Gauthier RN  Outcome: Adequate for Discharge  6/11/2024 1608 by Winnie Gauthier RN  Outcome: Adequate for Discharge     Problem: ABCDS Injury Assessment  Goal: Absence of physical injury  6/11/2024 1608 by Winnie Gauthier RN  Outcome: Adequate for Discharge  6/11/2024 1608 by Winnie Gauthier RN  Outcome: Adequate for Discharge     Problem: Safety - Adult  Goal: Free from fall injury  Outcome: Adequate for Discharge     Problem: Chronic Conditions and Co-morbidities  Goal: Patient's chronic conditions and co-morbidity symptoms are monitored and maintained or improved  Outcome: Adequate for Discharge

## 2024-06-11 NOTE — PLAN OF CARE
Problem: Discharge Planning  Goal: Discharge to home or other facility with appropriate resources  6/10/2024 2206 by Crissy Briceño GN  Outcome: Progressing  6/10/2024 1529 by Winnie Gauthier RN  Outcome: Progressing     Problem: ABCDS Injury Assessment  Goal: Absence of physical injury  6/10/2024 2206 by Crissy Briceño GN  Outcome: Progressing  6/10/2024 1529 by Winnie Gauthier RN  Outcome: Progressing     Problem: Safety - Adult  Goal: Free from fall injury  6/10/2024 2206 by Crissy Briceño GN  Outcome: Progressing  6/10/2024 1529 by Winnie Gauthier RN  Outcome: Progressing

## 2024-06-13 RX ORDER — ATORVASTATIN CALCIUM 40 MG/1
40 TABLET, FILM COATED ORAL DAILY
Qty: 90 TABLET | Refills: 3 | Status: SHIPPED | OUTPATIENT
Start: 2024-06-13

## 2024-07-24 ENCOUNTER — OFFICE VISIT (OUTPATIENT)
Age: 59
End: 2024-07-24
Payer: COMMERCIAL

## 2024-07-24 VITALS
SYSTOLIC BLOOD PRESSURE: 128 MMHG | HEART RATE: 46 BPM | DIASTOLIC BLOOD PRESSURE: 60 MMHG | HEIGHT: 71 IN | BODY MASS INDEX: 26.18 KG/M2 | OXYGEN SATURATION: 98 % | WEIGHT: 187 LBS

## 2024-07-24 DIAGNOSIS — M79.604 PAIN IN BOTH LOWER EXTREMITIES: Primary | ICD-10-CM

## 2024-07-24 DIAGNOSIS — M79.605 PAIN IN BOTH LOWER EXTREMITIES: Primary | ICD-10-CM

## 2024-07-24 DIAGNOSIS — I25.5 ISCHEMIC CARDIOMYOPATHY: ICD-10-CM

## 2024-07-24 DIAGNOSIS — R06.02 SOB (SHORTNESS OF BREATH): ICD-10-CM

## 2024-07-24 DIAGNOSIS — R07.9 CHEST PAIN, UNSPECIFIED TYPE: ICD-10-CM

## 2024-07-24 PROCEDURE — 93000 ELECTROCARDIOGRAM COMPLETE: CPT | Performed by: INTERNAL MEDICINE

## 2024-07-24 PROCEDURE — 3074F SYST BP LT 130 MM HG: CPT | Performed by: INTERNAL MEDICINE

## 2024-07-24 PROCEDURE — 3078F DIAST BP <80 MM HG: CPT | Performed by: INTERNAL MEDICINE

## 2024-07-24 PROCEDURE — 99214 OFFICE O/P EST MOD 30 MIN: CPT | Performed by: INTERNAL MEDICINE

## 2024-07-24 RX ORDER — BLOOD-GLUCOSE METER
KIT MISCELLANEOUS
COMMUNITY
Start: 2024-06-04

## 2024-07-24 NOTE — PROGRESS NOTES
Joseph Santos presents today for   Chief Complaint   Patient presents with    Follow-up    Follow-Up from Hospital      6-9-24 [facility] for syncope and collapse       Joseph Santos preferred language for health care discussion is english/other.    Is someone accompanying this pt? no    Is the patient using any DME equipment during OV? no    Depression Screening:  Depression: Not at risk (10/4/2023)    PHQ-2     PHQ-2 Score: 0        Learning Assessment:  Who is the primary learner? Patient    What is the preferred language for health care of the primary learner? ENGLISH    How does the primary learner prefer to learn new concepts? DEMONSTRATION    Answered By patient    Relationship to Learner SELF           Pt currently taking Anticoagulant therapy? no    Pt currently taking Antiplatelet therapy ? Plavix 75 mg daily.aspirin 81 mg daily      Coordination of Care:  1. Have you been to the ER, urgent care clinic since your last visit? Hospitalized since your last visit? no    2. Have you seen or consulted any other health care providers outside of the [facility] since your last visit? Include any pap smears or colon screening. no    
pounds.  His current weight is acceptable.  Hyperlipidemia:   Target LDL <70.  Continue Lipitor 40, TriCor 145  Anti-platelet:   Remains on ASA.     If he develops irreversible symptomatic bradycardia, he will require permanent pacemaker insertion.    I will see him back  in 3 months. Thank you.    Orders Placed This Encounter   Procedures    EKG 12 Lead     Order Specific Question:   Reason for Exam?     Answer:   Other        HPI  Today, Mr. Santos has no complaints of chest pains, or increased shortness of breath.  He denies any dizzy spells.  He denies any exertional fatigue.  He does have second-degree type I heart block.  He may require permanent pacemaker.  He denies any orthopnea or PND.  He denies any palpitations or dizziness.  His biggest complaint appears to be ED and slow stream.  He is scheduled to see urology.    Review of Systems  14 point review of system is negative unless mentioned in HPI    Physical Exam  Vitals and nursing note reviewed.   Constitutional:       Appearance: Normal appearance.   HENT:      Head: Normocephalic.   Eyes:      Conjunctiva/sclera: Conjunctivae normal.   Neck:      Vascular: No carotid bruit.   Cardiovascular:      Rate and Rhythm: Bradycardia present. Rhythm irregular.   Pulmonary:      Breath sounds: Normal breath sounds.   Abdominal:      Palpations: Abdomen is soft.   Musculoskeletal:         General: No swelling.      Cervical back: No rigidity.   Skin:     General: Skin is warm and dry.   Neurological:      General: No focal deficit present.      Mental Status: He is alert and oriented to person, place, and time.   Psychiatric:         Mood and Affect: Mood normal.         Behavior: Behavior normal.            PCP: Darien Ross MD    Past Medical History:   Diagnosis Date    Abnormal nuclear cardiac imaging test 01/31/2012    Mod inferior infarction w/mild-mod bright-infarct ischemia.  LVE.  EF 43%.  Marked basal inferior hypk; otherwise mild-mod

## 2024-10-23 ENCOUNTER — OFFICE VISIT (OUTPATIENT)
Age: 59
End: 2024-10-23
Payer: COMMERCIAL

## 2024-10-23 VITALS
WEIGHT: 189 LBS | OXYGEN SATURATION: 91 % | HEART RATE: 75 BPM | HEIGHT: 71 IN | BODY MASS INDEX: 26.46 KG/M2 | SYSTOLIC BLOOD PRESSURE: 132 MMHG | DIASTOLIC BLOOD PRESSURE: 72 MMHG

## 2024-10-23 DIAGNOSIS — M79.604 PAIN IN BOTH LOWER EXTREMITIES: Primary | ICD-10-CM

## 2024-10-23 DIAGNOSIS — R07.9 CHEST PAIN, UNSPECIFIED TYPE: ICD-10-CM

## 2024-10-23 DIAGNOSIS — R06.02 SOB (SHORTNESS OF BREATH): ICD-10-CM

## 2024-10-23 DIAGNOSIS — I25.5 ISCHEMIC CARDIOMYOPATHY: ICD-10-CM

## 2024-10-23 DIAGNOSIS — M79.605 PAIN IN BOTH LOWER EXTREMITIES: Primary | ICD-10-CM

## 2024-10-23 PROCEDURE — 93000 ELECTROCARDIOGRAM COMPLETE: CPT | Performed by: INTERNAL MEDICINE

## 2024-10-23 PROCEDURE — 99214 OFFICE O/P EST MOD 30 MIN: CPT | Performed by: INTERNAL MEDICINE

## 2024-10-23 PROCEDURE — 3075F SYST BP GE 130 - 139MM HG: CPT | Performed by: INTERNAL MEDICINE

## 2024-10-23 PROCEDURE — 3078F DIAST BP <80 MM HG: CPT | Performed by: INTERNAL MEDICINE

## 2024-10-23 RX ORDER — SEMAGLUTIDE 1.34 MG/ML
1 INJECTION, SOLUTION SUBCUTANEOUS
COMMUNITY
Start: 2024-10-03

## 2024-10-23 RX ORDER — EZETIMIBE 10 MG/1
10 TABLET ORAL DAILY
COMMUNITY
Start: 2024-10-04

## 2024-10-23 RX ORDER — SITAGLIPTIN 100 MG/1
1 TABLET, FILM COATED ORAL DAILY
COMMUNITY

## 2024-10-23 ASSESSMENT — PATIENT HEALTH QUESTIONNAIRE - PHQ9
SUM OF ALL RESPONSES TO PHQ9 QUESTIONS 1 & 2: 0
10. IF YOU CHECKED OFF ANY PROBLEMS, HOW DIFFICULT HAVE THESE PROBLEMS MADE IT FOR YOU TO DO YOUR WORK, TAKE CARE OF THINGS AT HOME, OR GET ALONG WITH OTHER PEOPLE: NOT DIFFICULT AT ALL
SUM OF ALL RESPONSES TO PHQ QUESTIONS 1-9: 0
SUM OF ALL RESPONSES TO PHQ QUESTIONS 1-9: 0
3. TROUBLE FALLING OR STAYING ASLEEP: NOT AT ALL
6. FEELING BAD ABOUT YOURSELF - OR THAT YOU ARE A FAILURE OR HAVE LET YOURSELF OR YOUR FAMILY DOWN: NOT AT ALL
7. TROUBLE CONCENTRATING ON THINGS, SUCH AS READING THE NEWSPAPER OR WATCHING TELEVISION: NOT AT ALL
8. MOVING OR SPEAKING SO SLOWLY THAT OTHER PEOPLE COULD HAVE NOTICED. OR THE OPPOSITE, BEING SO FIGETY OR RESTLESS THAT YOU HAVE BEEN MOVING AROUND A LOT MORE THAN USUAL: NOT AT ALL
SUM OF ALL RESPONSES TO PHQ QUESTIONS 1-9: 0
9. THOUGHTS THAT YOU WOULD BE BETTER OFF DEAD, OR OF HURTING YOURSELF: NOT AT ALL
5. POOR APPETITE OR OVEREATING: NOT AT ALL
SUM OF ALL RESPONSES TO PHQ QUESTIONS 1-9: 0
2. FEELING DOWN, DEPRESSED OR HOPELESS: NOT AT ALL
1. LITTLE INTEREST OR PLEASURE IN DOING THINGS: NOT AT ALL
4. FEELING TIRED OR HAVING LITTLE ENERGY: NOT AT ALL

## 2024-10-23 NOTE — PROGRESS NOTES
Joseph Santos presents today for   Chief Complaint   Patient presents with    Follow-up     3 month f/u with no concerns        Joseph Santos preferred language for health care discussion is english/other.    Is someone accompanying this pt? no    Is the patient using any DME equipment during OV? no    Depression Screening:  Depression: Not at risk (10/23/2024)    PHQ-2     PHQ-2 Score: 0        Learning Assessment:  Who is the primary learner? Patient    What is the preferred language for health care of the primary learner? ENGLISH    How does the primary learner prefer to learn new concepts? DEMONSTRATION    Answered By patient    Relationship to Learner SELF           Pt currently taking Anticoagulant therapy? no    Pt currently taking Antiplatelet therapy ? ASA 81 mg QD and Plavix 75 mg QD       Coordination of Care:  1. Have you been to the ER, urgent care clinic since your last visit? Hospitalized since your last visit? no    2. Have you seen or consulted any other health care providers outside of the [facility] since your last visit? Include any pap smears or colon screening. no    
pounds.  Hyperlipidemia:   Target LDL <70.  Continue Lipitor 40, TriCor 145  Anti-platelet:   Remains on ASA.     If he develops irreversible symptomatic bradycardia, he will require permanent pacemaker insertion.  I explained to him at length about symptoms to look out for possible SSS.  He will measure his heart rate.  If he notices any worsening COOK, exercise capacity, or dizziness, he will get back in touch with me.  I will see him back  in 6 months. Thank you.    Orders Placed This Encounter   Procedures    EKG 12 Lead     Order Specific Question:   Reason for Exam?     Answer:   Other        HPI  Today, Mr. Santos has no complaints of chest pains.  However, he does have known first-degree AV block.  He remains asymptomatic.  His heart rate remains adequate.  He denies any exertional chest pains.  He denies any worsening exercise capacity or COOK.  He denies any orthopnea or PND.  He has not noted any palpitations or dizziness.    Review of Systems  14 point review of system is negative unless mentioned in HPI    Physical Exam  Vitals and nursing note reviewed.   Constitutional:       Appearance: Normal appearance.   HENT:      Head: Normocephalic.   Eyes:      Conjunctiva/sclera: Conjunctivae normal.   Neck:      Vascular: No carotid bruit.   Cardiovascular:      Rate and Rhythm: Normal rate and regular rhythm.   Pulmonary:      Breath sounds: Normal breath sounds.   Abdominal:      Palpations: Abdomen is soft.   Musculoskeletal:         General: No swelling.      Cervical back: No rigidity.   Skin:     General: Skin is warm and dry.   Neurological:      General: No focal deficit present.      Mental Status: He is alert and oriented to person, place, and time.   Psychiatric:         Mood and Affect: Mood normal.         Behavior: Behavior normal.            PCP: Darien Ross MD    Past Medical History:   Diagnosis Date    Abnormal nuclear cardiac imaging test 01/31/2012    Mod inferior infarction

## 2024-11-13 ENCOUNTER — HOSPITAL ENCOUNTER (OUTPATIENT)
Facility: HOSPITAL | Age: 59
Discharge: HOME | End: 2024-11-16
Payer: COMMERCIAL

## 2024-11-13 ENCOUNTER — TRANSCRIBE ORDERS (OUTPATIENT)
Facility: HOSPITAL | Age: 59
End: 2024-11-13

## 2024-11-13 DIAGNOSIS — M54.2 NECK PAIN: Primary | ICD-10-CM

## 2024-11-13 DIAGNOSIS — M54.2 NECK PAIN: ICD-10-CM

## 2024-11-13 PROCEDURE — 72040 X-RAY EXAM NECK SPINE 2-3 VW: CPT

## 2024-12-10 ENCOUNTER — HOSPITAL ENCOUNTER (INPATIENT)
Facility: HOSPITAL | Age: 59
LOS: 1 days | DRG: 917 | End: 2024-12-11
Attending: EMERGENCY MEDICINE | Admitting: INTERNAL MEDICINE
Payer: COMMERCIAL

## 2024-12-10 ENCOUNTER — APPOINTMENT (OUTPATIENT)
Facility: HOSPITAL | Age: 59
DRG: 917 | End: 2024-12-10
Payer: COMMERCIAL

## 2024-12-10 ENCOUNTER — PROCEDURE VISIT (OUTPATIENT)
Age: 59
End: 2024-12-10
Payer: COMMERCIAL

## 2024-12-10 DIAGNOSIS — I46.9 CARDIAC ARREST: Primary | ICD-10-CM

## 2024-12-10 LAB
ALBUMIN SERPL-MCNC: 3 G/DL (ref 3.4–5)
ALBUMIN/GLOB SERPL: 1.1 (ref 0.8–1.7)
ALP SERPL-CCNC: 55 U/L (ref 45–117)
ALT SERPL-CCNC: 23 U/L (ref 16–61)
AMPHET UR QL SCN: NEGATIVE
ANION GAP BLD CALC-SCNC: ABNORMAL MMOL/L (ref 10–20)
ANION GAP BLD CALC-SCNC: ABNORMAL MMOL/L (ref 10–20)
ANION GAP SERPL CALC-SCNC: 12 MMOL/L (ref 3–18)
APPEARANCE UR: ABNORMAL
APTT PPP: 26.2 SEC (ref 23–36.4)
ARTERIAL PATENCY WRIST A: NEGATIVE
ARTERIAL PATENCY WRIST A: POSITIVE
AST SERPL-CCNC: 34 U/L (ref 10–38)
BACTERIA URNS QL MICRO: NEGATIVE /HPF
BARBITURATES UR QL SCN: NEGATIVE
BASE DEFICIT BLD-SCNC: 14.5 MMOL/L
BASE DEFICIT BLD-SCNC: 9.8 MMOL/L
BASOPHILS # BLD: 0.2 K/UL (ref 0–0.1)
BASOPHILS NFR BLD: 1 % (ref 0–2)
BDY SITE: ABNORMAL
BDY SITE: ABNORMAL
BENZODIAZ UR QL: NEGATIVE
BILIRUB SERPL-MCNC: 0.5 MG/DL (ref 0.2–1)
BILIRUB UR QL: NEGATIVE
BUN SERPL-MCNC: 21 MG/DL (ref 7–18)
BUN/CREAT SERPL: 10 (ref 12–20)
CA-I BLD-MCNC: 0.54 MMOL/L (ref 1.15–1.33)
CA-I BLD-MCNC: 1.21 MMOL/L (ref 1.15–1.33)
CALCIUM SERPL-MCNC: 9.3 MG/DL (ref 8.5–10.1)
CANNABINOIDS UR QL SCN: NEGATIVE
CHLORIDE BLD-SCNC: 102 MMOL/L (ref 98–107)
CHLORIDE BLD-SCNC: 104 MMOL/L (ref 98–107)
CHLORIDE SERPL-SCNC: 98 MMOL/L (ref 100–111)
CO2 BLD-SCNC: 16 MMOL/L (ref 22–29)
CO2 SERPL-SCNC: 26 MMOL/L (ref 21–32)
COCAINE UR QL SCN: NEGATIVE
COLOR UR: YELLOW
CREAT BLD-MCNC: 1.44 MG/DL (ref 0.6–1.3)
CREAT BLD-MCNC: 2.03 MG/DL (ref 0.6–1.3)
CREAT SERPL-MCNC: 2.21 MG/DL (ref 0.6–1.3)
DIFFERENTIAL METHOD BLD: ABNORMAL
EOSINOPHIL # BLD: 0 K/UL (ref 0–0.4)
EOSINOPHIL NFR BLD: 0 % (ref 0–5)
EPITH CASTS URNS QL MICRO: ABNORMAL /LPF (ref 0–5)
ERYTHROCYTE [DISTWIDTH] IN BLOOD BY AUTOMATED COUNT: 13.3 % (ref 11.6–14.5)
ETHANOL SERPL-MCNC: <3 MG/DL (ref 0–3)
FIO2 ON VENT: 90 %
GAS FLOW.O2 O2 DELIVERY SYS: ABNORMAL
GAS FLOW.O2 O2 DELIVERY SYS: ABNORMAL
GAS FLOW.O2 SETTING OXYMISER: 22 BPM
GLOBULIN SER CALC-MCNC: 2.8 G/DL (ref 2–4)
GLUCOSE BLD STRIP.AUTO-MCNC: 254 MG/DL (ref 70–110)
GLUCOSE BLD-MCNC: 205 MG/DL (ref 74–99)
GLUCOSE BLD-MCNC: 290 MG/DL (ref 74–99)
GLUCOSE SERPL-MCNC: 271 MG/DL (ref 74–99)
GLUCOSE UR STRIP.AUTO-MCNC: 100 MG/DL
HCO3 BLD-SCNC: 16.1 MMOL/L (ref 21–28)
HCO3 BLD-SCNC: 18.3 MMOL/L (ref 21–28)
HCT VFR BLD AUTO: 38.4 % (ref 36–48)
HGB BLD-MCNC: 12.1 G/DL (ref 13–16)
HGB UR QL STRIP: ABNORMAL
HYALINE CASTS URNS QL MICRO: ABNORMAL /LPF (ref 0–2)
IMM GRANULOCYTES # BLD AUTO: 0 K/UL (ref 0–0.04)
IMM GRANULOCYTES NFR BLD AUTO: 0 % (ref 0–0.5)
INR PPP: 1.2 (ref 0.9–1.1)
KETONES UR QL STRIP.AUTO: NEGATIVE MG/DL
LACTATE BLD-SCNC: 8.51 MMOL/L (ref 0.4–2)
LACTATE BLD-SCNC: 9.2 MMOL/L (ref 0.4–2)
LACTATE BLD-SCNC: 9.21 MMOL/L (ref 0.4–2)
LEUKOCYTE ESTERASE UR QL STRIP.AUTO: NEGATIVE
LYMPHOCYTES # BLD: 7.1 K/UL (ref 0.9–3.6)
LYMPHOCYTES NFR BLD: 47 % (ref 21–52)
Lab: ABNORMAL
MAGNESIUM SERPL-MCNC: 2.4 MG/DL (ref 1.6–2.6)
MCH RBC QN AUTO: 29.9 PG (ref 24–34)
MCHC RBC AUTO-ENTMCNC: 31.5 G/DL (ref 31–37)
MCV RBC AUTO: 94.8 FL (ref 78–100)
METHADONE UR QL: NEGATIVE
MONOCYTES # BLD: 0.3 K/UL (ref 0.05–1.2)
MONOCYTES NFR BLD: 2 % (ref 3–10)
MUCOUS THREADS URNS QL MICRO: ABNORMAL /LPF
NEUTS SEG # BLD: 7.4 K/UL (ref 1.8–8)
NEUTS SEG NFR BLD: 49 % (ref 40–73)
NITRITE UR QL STRIP.AUTO: NEGATIVE
NRBC # BLD: 0 K/UL (ref 0–0.01)
NRBC BLD-RTO: 0 PER 100 WBC
NT PRO BNP: ABNORMAL PG/ML (ref 0–900)
O2/TOTAL GAS SETTING VFR VENT: 100 %
OPIATES UR QL: POSITIVE
OTHER CELLS NFR BLD MANUAL: 1
PCO2 BLD: 34.3 MMHG (ref 35–48)
PCO2 BLD: 84.9 MMHG (ref 35–48)
PCO2 BLDV: >110 MMHG (ref 41–51)
PCP UR QL: NEGATIVE
PEEP RESPIRATORY: 5 CMH2O
PEEP RESPIRATORY: 8
PH BLD: 6.94 (ref 7.35–7.45)
PH BLD: 7.28 (ref 7.35–7.45)
PH BLDV: 6.76 (ref 7.32–7.42)
PH UR STRIP: 6 (ref 5–8)
PLATELET # BLD AUTO: 315 K/UL (ref 135–420)
PLATELET COMMENT: ABNORMAL
PMV BLD AUTO: 10.2 FL (ref 9.2–11.8)
PO2 BLD: 140 MMHG (ref 83–108)
PO2 BLD: 91 MMHG (ref 83–108)
PO2 BLDV: 31 MMHG (ref 25–40)
POTASSIUM BLD-SCNC: 3.6 MMOL/L (ref 3.5–5.1)
POTASSIUM BLD-SCNC: 3.8 MMOL/L (ref 3.5–5.1)
POTASSIUM SERPL-SCNC: 4.9 MMOL/L (ref 3.5–5.5)
PROCALCITONIN SERPL-MCNC: <0.05 NG/ML
PROT SERPL-MCNC: 5.8 G/DL (ref 6.4–8.2)
PROT UR STRIP-MCNC: 300 MG/DL
PROTHROMBIN TIME: 15 SEC (ref 11.9–14.9)
RBC # BLD AUTO: 4.05 M/UL (ref 4.35–5.65)
RBC #/AREA URNS HPF: ABNORMAL /HPF (ref 0–5)
RBC MORPH BLD: ABNORMAL
SALICYLATES SERPL-MCNC: <1.7 MG/DL (ref 2.8–20)
SAO2 % BLD: 88.6 % (ref 92–97)
SAO2 % BLD: 99 % (ref 94–98)
SERVICE CMNT-IMP: ABNORMAL
SODIUM BLD-SCNC: 137 MMOL/L (ref 136–145)
SODIUM BLD-SCNC: 140 MMOL/L (ref 136–145)
SODIUM SERPL-SCNC: 136 MMOL/L (ref 136–145)
SP GR UR REFRACTOMETRY: 1.01 (ref 1–1.03)
SPECIMEN SITE: ABNORMAL
SPECIMEN SITE: ABNORMAL
SPECIMEN TYPE: ABNORMAL
TROPONIN I SERPL HS-MCNC: 100 NG/L (ref 0–78)
TROPONIN I SERPL HS-MCNC: 1639 NG/L (ref 0–78)
UROBILINOGEN UR QL STRIP.AUTO: 0.2 EU/DL (ref 0.2–1)
VENTILATION MODE VENT: ABNORMAL
VENTILATION MODE VENT: ABNORMAL
VT SETTING VENT: 450
VT SETTING VENT: 450 ML
WBC # BLD AUTO: 15.1 K/UL (ref 4.6–13.2)
WBC URNS QL MICRO: ABNORMAL /HPF (ref 0–4)

## 2024-12-10 PROCEDURE — 95813 EEG EXTND MNTR 61-119 MIN: CPT | Performed by: PSYCHIATRY & NEUROLOGY

## 2024-12-10 PROCEDURE — 2580000003 HC RX 258: Performed by: EMERGENCY MEDICINE

## 2024-12-10 PROCEDURE — 6360000002 HC RX W HCPCS: Performed by: STUDENT IN AN ORGANIZED HEALTH CARE EDUCATION/TRAINING PROGRAM

## 2024-12-10 PROCEDURE — 82330 ASSAY OF CALCIUM: CPT

## 2024-12-10 PROCEDURE — 0BH17EZ INSERTION OF ENDOTRACHEAL AIRWAY INTO TRACHEA, VIA NATURAL OR ARTIFICIAL OPENING: ICD-10-PCS | Performed by: EMERGENCY MEDICINE

## 2024-12-10 PROCEDURE — 83605 ASSAY OF LACTIC ACID: CPT

## 2024-12-10 PROCEDURE — 93005 ELECTROCARDIOGRAM TRACING: CPT | Performed by: STUDENT IN AN ORGANIZED HEALTH CARE EDUCATION/TRAINING PROGRAM

## 2024-12-10 PROCEDURE — 80179 DRUG ASSAY SALICYLATE: CPT

## 2024-12-10 PROCEDURE — 96366 THER/PROPH/DIAG IV INF ADDON: CPT

## 2024-12-10 PROCEDURE — 85610 PROTHROMBIN TIME: CPT

## 2024-12-10 PROCEDURE — 6360000004 HC RX CONTRAST MEDICATION: Performed by: EMERGENCY MEDICINE

## 2024-12-10 PROCEDURE — 5A2204Z RESTORATION OF CARDIAC RHYTHM, SINGLE: ICD-10-PCS | Performed by: EMERGENCY MEDICINE

## 2024-12-10 PROCEDURE — 85025 COMPLETE CBC W/AUTO DIFF WBC: CPT

## 2024-12-10 PROCEDURE — 94761 N-INVAS EAR/PLS OXIMETRY MLT: CPT

## 2024-12-10 PROCEDURE — 96368 THER/DIAG CONCURRENT INF: CPT

## 2024-12-10 PROCEDURE — 5A12012 PERFORMANCE OF CARDIAC OUTPUT, SINGLE, MANUAL: ICD-10-PCS | Performed by: EMERGENCY MEDICINE

## 2024-12-10 PROCEDURE — 84484 ASSAY OF TROPONIN QUANT: CPT

## 2024-12-10 PROCEDURE — 96375 TX/PRO/DX INJ NEW DRUG ADDON: CPT

## 2024-12-10 PROCEDURE — 1100000000 HC RM PRIVATE

## 2024-12-10 PROCEDURE — 2500000003 HC RX 250 WO HCPCS

## 2024-12-10 PROCEDURE — 71275 CT ANGIOGRAPHY CHEST: CPT

## 2024-12-10 PROCEDURE — 2700000000 HC OXYGEN THERAPY PER DAY

## 2024-12-10 PROCEDURE — 80053 COMPREHEN METABOLIC PANEL: CPT

## 2024-12-10 PROCEDURE — 93005 ELECTROCARDIOGRAM TRACING: CPT

## 2024-12-10 PROCEDURE — 71045 X-RAY EXAM CHEST 1 VIEW: CPT

## 2024-12-10 PROCEDURE — 99291 CRITICAL CARE FIRST HOUR: CPT

## 2024-12-10 PROCEDURE — 36600 WITHDRAWAL OF ARTERIAL BLOOD: CPT

## 2024-12-10 PROCEDURE — 70450 CT HEAD/BRAIN W/O DYE: CPT

## 2024-12-10 PROCEDURE — 85014 HEMATOCRIT: CPT

## 2024-12-10 PROCEDURE — 3E033XZ INTRODUCTION OF VASOPRESSOR INTO PERIPHERAL VEIN, PERCUTANEOUS APPROACH: ICD-10-PCS | Performed by: EMERGENCY MEDICINE

## 2024-12-10 PROCEDURE — 80307 DRUG TEST PRSMV CHEM ANLYZR: CPT

## 2024-12-10 PROCEDURE — 81001 URINALYSIS AUTO W/SCOPE: CPT

## 2024-12-10 PROCEDURE — 82803 BLOOD GASES ANY COMBINATION: CPT

## 2024-12-10 PROCEDURE — 74177 CT ABD & PELVIS W/CONTRAST: CPT

## 2024-12-10 PROCEDURE — 06HY33Z INSERTION OF INFUSION DEVICE INTO LOWER VEIN, PERCUTANEOUS APPROACH: ICD-10-PCS | Performed by: EMERGENCY MEDICINE

## 2024-12-10 PROCEDURE — 5A1935Z RESPIRATORY VENTILATION, LESS THAN 24 CONSECUTIVE HOURS: ICD-10-PCS | Performed by: EMERGENCY MEDICINE

## 2024-12-10 PROCEDURE — 83880 ASSAY OF NATRIURETIC PEPTIDE: CPT

## 2024-12-10 PROCEDURE — 87040 BLOOD CULTURE FOR BACTERIA: CPT

## 2024-12-10 PROCEDURE — 94002 VENT MGMT INPAT INIT DAY: CPT

## 2024-12-10 PROCEDURE — 6360000002 HC RX W HCPCS

## 2024-12-10 PROCEDURE — 2580000003 HC RX 258

## 2024-12-10 PROCEDURE — 82962 GLUCOSE BLOOD TEST: CPT

## 2024-12-10 PROCEDURE — 92950 HEART/LUNG RESUSCITATION CPR: CPT

## 2024-12-10 PROCEDURE — 93005 ELECTROCARDIOGRAM TRACING: CPT | Performed by: EMERGENCY MEDICINE

## 2024-12-10 PROCEDURE — 84145 PROCALCITONIN (PCT): CPT

## 2024-12-10 PROCEDURE — 82077 ASSAY SPEC XCP UR&BREATH IA: CPT

## 2024-12-10 PROCEDURE — 84295 ASSAY OF SERUM SODIUM: CPT

## 2024-12-10 PROCEDURE — 82947 ASSAY GLUCOSE BLOOD QUANT: CPT

## 2024-12-10 PROCEDURE — 85730 THROMBOPLASTIN TIME PARTIAL: CPT

## 2024-12-10 PROCEDURE — 31500 INSERT EMERGENCY AIRWAY: CPT

## 2024-12-10 PROCEDURE — 83735 ASSAY OF MAGNESIUM: CPT

## 2024-12-10 PROCEDURE — 84132 ASSAY OF SERUM POTASSIUM: CPT

## 2024-12-10 PROCEDURE — 96376 TX/PRO/DX INJ SAME DRUG ADON: CPT

## 2024-12-10 PROCEDURE — 96365 THER/PROPH/DIAG IV INF INIT: CPT

## 2024-12-10 PROCEDURE — 2500000003 HC RX 250 WO HCPCS: Performed by: EMERGENCY MEDICINE

## 2024-12-10 RX ORDER — CALCIUM CHLORIDE 100 MG/ML
INJECTION INTRAVENOUS; INTRAVENTRICULAR
Status: COMPLETED
Start: 2024-12-10 | End: 2024-12-10

## 2024-12-10 RX ORDER — NOREPINEPHRINE BITARTRATE 0.06 MG/ML
1-100 INJECTION, SOLUTION INTRAVENOUS CONTINUOUS
Status: DISCONTINUED | OUTPATIENT
Start: 2024-12-10 | End: 2024-12-11

## 2024-12-10 RX ORDER — CHLORHEXIDINE GLUCONATE ORAL RINSE 1.2 MG/ML
15 SOLUTION DENTAL 2 TIMES DAILY
Status: DISCONTINUED | OUTPATIENT
Start: 2024-12-11 | End: 2024-12-11

## 2024-12-10 RX ORDER — ESMOLOL HYDROCHLORIDE 10 MG/ML
300 INJECTION INTRAVENOUS ONCE
OUTPATIENT
Start: 2024-12-10 | End: 2024-12-10

## 2024-12-10 RX ORDER — HEPARIN SODIUM 1000 [USP'U]/ML
2000 INJECTION, SOLUTION INTRAVENOUS; SUBCUTANEOUS PRN
Status: DISCONTINUED | OUTPATIENT
Start: 2024-12-10 | End: 2024-12-11

## 2024-12-10 RX ORDER — CALCIUM GLUCONATE 20 MG/ML
INJECTION, SOLUTION INTRAVENOUS
Status: DISCONTINUED
Start: 2024-12-10 | End: 2024-12-10 | Stop reason: WASHOUT

## 2024-12-10 RX ORDER — MAGNESIUM SULFATE IN WATER 40 MG/ML
2000 INJECTION, SOLUTION INTRAVENOUS ONCE
Status: COMPLETED | OUTPATIENT
Start: 2024-12-10 | End: 2024-12-11

## 2024-12-10 RX ORDER — HEPARIN SODIUM 1000 [USP'U]/ML
4000 INJECTION, SOLUTION INTRAVENOUS; SUBCUTANEOUS PRN
Status: DISCONTINUED | OUTPATIENT
Start: 2024-12-10 | End: 2024-12-11

## 2024-12-10 RX ORDER — LIDOCAINE HYDROCHLORIDE ANHYDROUS AND DEXTROSE MONOHYDRATE 5; 400 G/100ML; MG/100ML
2 INJECTION, SOLUTION INTRAVENOUS CONTINUOUS
Status: DISCONTINUED | OUTPATIENT
Start: 2024-12-10 | End: 2024-12-11

## 2024-12-10 RX ORDER — HEPARIN SODIUM 10000 [USP'U]/100ML
5-30 INJECTION, SOLUTION INTRAVENOUS CONTINUOUS
Status: DISCONTINUED | OUTPATIENT
Start: 2024-12-10 | End: 2024-12-11

## 2024-12-10 RX ORDER — 0.9 % SODIUM CHLORIDE 0.9 %
1000 INTRAVENOUS SOLUTION INTRAVENOUS ONCE
Status: COMPLETED | OUTPATIENT
Start: 2024-12-10 | End: 2024-12-10

## 2024-12-10 RX ORDER — HEPARIN SODIUM 1000 [USP'U]/ML
4000 INJECTION, SOLUTION INTRAVENOUS; SUBCUTANEOUS ONCE
Status: COMPLETED | OUTPATIENT
Start: 2024-12-10 | End: 2024-12-10

## 2024-12-10 RX ORDER — SODIUM CHLORIDE 9 MG/ML
INJECTION, SOLUTION INTRAVENOUS CONTINUOUS
Status: DISCONTINUED | OUTPATIENT
Start: 2024-12-10 | End: 2024-12-10

## 2024-12-10 RX ORDER — NOREPINEPHRINE BITARTRATE 0.06 MG/ML
.5-2 INJECTION, SOLUTION INTRAVENOUS CONTINUOUS
Status: DISCONTINUED | OUTPATIENT
Start: 2024-12-10 | End: 2024-12-10 | Stop reason: SDUPTHER

## 2024-12-10 RX ORDER — LIDOCAINE HCL/PF 100 MG/5ML
100 SYRINGE (ML) INJECTION
Status: DISCONTINUED | OUTPATIENT
Start: 2024-12-10 | End: 2024-12-11

## 2024-12-10 RX ORDER — IODIXANOL 320 MG/ML
80 INJECTION, SOLUTION INTRAVASCULAR
Status: COMPLETED | OUTPATIENT
Start: 2024-12-10 | End: 2024-12-10

## 2024-12-10 RX ORDER — ESMOLOL HYDROCHLORIDE 10 MG/ML
25-300 INJECTION, SOLUTION INTRAVENOUS CONTINUOUS
Status: DISCONTINUED | OUTPATIENT
Start: 2024-12-10 | End: 2024-12-11

## 2024-12-10 RX ADMIN — PIPERACILLIN AND TAZOBACTAM 4500 MG: 4; .5 INJECTION, POWDER, FOR SOLUTION INTRAVENOUS; PARENTERAL at 19:13

## 2024-12-10 RX ADMIN — AMIODARONE HYDROCHLORIDE 1 MG/MIN: 1.8 INJECTION, SOLUTION INTRAVENOUS at 19:08

## 2024-12-10 RX ADMIN — HEPARIN SODIUM 11 UNITS/KG/HR: 10000 INJECTION, SOLUTION INTRAVENOUS at 23:10

## 2024-12-10 RX ADMIN — CALCIUM CHLORIDE 1000 MG: 100 INJECTION INTRAVENOUS; INTRAVENTRICULAR at 19:22

## 2024-12-10 RX ADMIN — CALCIUM CHLORIDE 1000 MG: 100 INJECTION INTRAVENOUS; INTRAVENTRICULAR at 19:18

## 2024-12-10 RX ADMIN — SODIUM BICARBONATE 50 MEQ: 84 INJECTION INTRAVENOUS at 18:20

## 2024-12-10 RX ADMIN — ESMOLOL HYDROCHLORIDE IN SODIUM CHLORIDE 50 MCG/KG/MIN: 10 INJECTION INTRAVENOUS at 23:22

## 2024-12-10 RX ADMIN — IODIXANOL 80 ML: 320 INJECTION, SOLUTION INTRAVASCULAR at 20:34

## 2024-12-10 RX ADMIN — HEPARIN SODIUM 4000 UNITS: 1000 INJECTION INTRAVENOUS; SUBCUTANEOUS at 23:07

## 2024-12-10 RX ADMIN — VANCOMYCIN HYDROCHLORIDE 2000 MG: 10 INJECTION, POWDER, LYOPHILIZED, FOR SOLUTION INTRAVENOUS at 20:53

## 2024-12-10 RX ADMIN — Medication 4 MCG/MIN: at 17:44

## 2024-12-10 RX ADMIN — AMIODARONE HYDROCHLORIDE 150 MG: 1.5 INJECTION, SOLUTION INTRAVENOUS at 18:11

## 2024-12-10 RX ADMIN — Medication 40 MCG/MIN: at 23:00

## 2024-12-10 RX ADMIN — SODIUM CHLORIDE: 9 INJECTION, SOLUTION INTRAVENOUS at 17:50

## 2024-12-10 RX ADMIN — SODIUM CHLORIDE 1000 ML: 9 INJECTION, SOLUTION INTRAVENOUS at 17:50

## 2024-12-10 RX ADMIN — Medication 5 MCG/MIN: at 17:47

## 2024-12-10 ASSESSMENT — PULMONARY FUNCTION TESTS
PIF_VALUE: 30
PIF_VALUE: 27
PIF_VALUE: 25

## 2024-12-10 NOTE — ED TRIAGE NOTES
Pt presents to ed via ems with c/o cardiac arrest. Pt was found at home by ems unresponsive on bed with vomit. Ems stated patient spoke explicatives and then passed out on bed.    Pacing began by ems.   1mg epi given in route  2mg versed in route

## 2024-12-10 NOTE — PROGRESS NOTES
responded to the [employer] page for the patient.  met patient and staff at bedside.     was unable to assess patient because patient was in cardiac arrest. No family members were present at the time of the page.     provided presence and support for staff.     Chaplains will provide follow-up care for patient and family as needed.

## 2024-12-10 NOTE — ED NOTES
EMS arrived with pt.     Pulse checked by Dr. Bassett. No pulse observed.     1658- compressions started  1700- pulse check- asystole  1702- Epi administered  1703- pulse check- asystole-compressions resumed  1705 pulse check- asystole- epi given-compressions resumed  1707- pulse check-pea-compressions resumed  1708- epi given  1710- pulse check-rohit applied-sodium bicarb administered  1711-   1713- pulse check-pea  1714-epi given  1716- pulse check  1717- epi given  1718- pulse check- pulse palpated by Dr. Bassett. 1718- ROSC obtained        1720 Vital Signs:     60  96  22  166/72

## 2024-12-10 NOTE — PROGRESS NOTES
[facility]   Pharmacy Pharmacokinetic Monitoring Service - Vancomycin     Joseph Santos is a 59 y.o. male starting on vancomycin therapy for Sepsis of Unknown Etiology. Pharmacy consulted for monitoring and adjustment.    Target Concentration: Dosing based on anticipated concentration <15 mg/L due to renal impairment/insufficiency    Additional Antimicrobials: Piperacillin/Tazobactam    Pertinent Laboratory Values:   Weight - Scale: 89.4 kg (197 lb 3.2 oz)  Recent Labs     12/10/24  1702 12/10/24  1714   CREATININE 2.21* 1.44*   BUN 21*  --    WBC 15.1*  --    PROCAL <0.05  --      Estimated Creatinine Clearance: 57 mL/min (A) (based on SCr of 1.44 mg/dL (H)).    Pertinent Cultures:  Culture Date Source Results   12/10 Blood  Pending    MRSA Nasal Swab: N/A. Non-respiratory infection    Plan:  Concentration-guided dosing due to renal impairment/insufficiency  Start vancomycin 2000 mg (22.3 mg/kg) x1   Will dose according to vancomycin concentration levels at this time due to renal insufficiency  BMP ordered with AM labs tomorrow   Vancomycin concentration ordered with AM labs tomorrow  Pharmacy will continue to monitor patient and adjust therapy as indicated    Thank you for the consult,  Susan Aguilar RPH  12/10/2024

## 2024-12-10 NOTE — PROCEDURES
PROCEDURE NOTE  Date: 12/10/2024   Name: Joseph Santos  YOB: 1965    Intubation    Date/Time: 12/10/2024 5:15 PM    Performed by: Krystle Pimentel MD  Authorized by: Krystle Pimentel MD    Consent:     Consent obtained:  Emergent situation  Universal protocol:     Patient identity confirmed:  Anonymous protocol, patient vented/unresponsive  Pre-procedure details:     Indications: cardio/pulmonary arrest      Patient status:  Unresponsive    Look externally: facial hair      Mouth opening - incisor distance:  2 finger widths    Hyoid-mental distance: 2 finger widths      Hyoid-thyroid distance: 2 or more finger widths      Obstruction: none      Neck mobility: normal      Pharmacologic strategy: none      Induction agents:  None    Paralytics:  None  Procedure details:     Preoxygenation:  Supraglottic device    CPR in progress: yes      Number of attempts:  2  Successful intubation attempt details:     Intubation method:  Oral    Intubation technique: video assisted      Laryngoscope blade:  Hypercurved    Bougie used: no      Grade view: III      Tube size (mm):  7.5    Tube type:  Cuffed    Tube visualized through cords: yes    First unsuccessful intubation attempt details:     Intubation method:  Oral    Intubation technique:  Video assisted    Laryngoscope blade:  Mac 4    Bougie used: no      Grade view: III      Tube size (mm):  7.5    Tube type:  Cuffed    Ventilation between 1st and 2nd attempt: yes with mask      Tube visualized through cords: yes    Placement assessment:     ETT at teeth/gumline (cm):  23cm    Tube secured with:  ETT de la cruz    Breath sounds:  Equal    Placement verification: chest rise, colorimetric ETCO2 and equal breath sounds    Post-procedure details:     Procedure completion:  Procedure terminated due to patient's clinical status    Complications: cardiac arrest and emesis      Krystle Pimentel MD, MPH  Emergency Medicine, PGY-1  [facility]

## 2024-12-11 VITALS
BODY MASS INDEX: 27.61 KG/M2 | HEIGHT: 71 IN | WEIGHT: 197.2 LBS | SYSTOLIC BLOOD PRESSURE: 135 MMHG | TEMPERATURE: 86 F | OXYGEN SATURATION: 97 % | DIASTOLIC BLOOD PRESSURE: 64 MMHG

## 2024-12-11 PROBLEM — Z51.5 ENCOUNTER FOR PALLIATIVE CARE: Status: ACTIVE | Noted: 2024-12-11

## 2024-12-11 PROBLEM — I21.4 NSTEMI (NON-ST ELEVATED MYOCARDIAL INFARCTION) (HCC): Status: ACTIVE | Noted: 2024-12-11

## 2024-12-11 PROBLEM — Z71.89 GOALS OF CARE, COUNSELING/DISCUSSION: Status: ACTIVE | Noted: 2024-12-11

## 2024-12-11 PROBLEM — J96.01 ACUTE RESPIRATORY FAILURE WITH HYPOXIA: Status: ACTIVE | Noted: 2024-12-11

## 2024-12-11 PROBLEM — J69.0 ASPIRATION PNEUMONIA (HCC): Status: ACTIVE | Noted: 2024-12-11

## 2024-12-11 LAB
ALBUMIN SERPL-MCNC: 2.8 G/DL (ref 3.4–5)
ALBUMIN SERPL-MCNC: 3.1 G/DL (ref 3.4–5)
ALBUMIN/GLOB SERPL: 1.1 (ref 0.8–1.7)
ALP SERPL-CCNC: 75 U/L (ref 45–117)
ALT SERPL-CCNC: 233 U/L (ref 16–61)
ANION GAP BLD CALC-SCNC: ABNORMAL MMOL/L (ref 10–20)
ANION GAP SERPL CALC-SCNC: 16 MMOL/L (ref 3–18)
ANION GAP SERPL CALC-SCNC: 18 MMOL/L (ref 3–18)
ANION GAP SERPL CALC-SCNC: 9 MMOL/L (ref 3–18)
APTT PPP: 75.3 SEC (ref 23–36.4)
APTT PPP: 87.5 SEC (ref 23–36.4)
ARTERIAL PATENCY WRIST A: POSITIVE
AST SERPL-CCNC: 489 U/L (ref 10–38)
B PERT DNA SPEC QL NAA+PROBE: NOT DETECTED
BASE DEFICIT BLD-SCNC: 5.5 MMOL/L
BASOPHILS # BLD: 0 K/UL (ref 0–0.1)
BASOPHILS NFR BLD: 0 % (ref 0–2)
BDY SITE: ABNORMAL
BILIRUB DIRECT SERPL-MCNC: 0.6 MG/DL (ref 0–0.2)
BILIRUB SERPL-MCNC: 0.9 MG/DL (ref 0.2–1)
BORDETELLA PARAPERTUSSIS BY PCR: NOT DETECTED
BUN SERPL-MCNC: 32 MG/DL (ref 7–18)
BUN SERPL-MCNC: 36 MG/DL (ref 7–18)
BUN SERPL-MCNC: 42 MG/DL (ref 7–18)
BUN/CREAT SERPL: 11 (ref 12–20)
BUN/CREAT SERPL: 11 (ref 12–20)
BUN/CREAT SERPL: 13 (ref 12–20)
C PNEUM DNA SPEC QL NAA+PROBE: NOT DETECTED
CA-I BLD-MCNC: 1.2 MMOL/L (ref 1.15–1.33)
CALCIUM SERPL-MCNC: 9.1 MG/DL (ref 8.5–10.1)
CALCIUM SERPL-MCNC: 9.8 MG/DL (ref 8.5–10.1)
CALCIUM SERPL-MCNC: 9.9 MG/DL (ref 8.5–10.1)
CHLORIDE BLD-SCNC: 99 MMOL/L (ref 98–107)
CHLORIDE SERPL-SCNC: 100 MMOL/L (ref 100–111)
CHLORIDE SERPL-SCNC: 98 MMOL/L (ref 100–111)
CHLORIDE SERPL-SCNC: 99 MMOL/L (ref 100–111)
CO2 BLD-SCNC: 17 MMOL/L (ref 22–29)
CO2 SERPL-SCNC: 20 MMOL/L (ref 21–32)
CO2 SERPL-SCNC: 24 MMOL/L (ref 21–32)
CO2 SERPL-SCNC: 29 MMOL/L (ref 21–32)
CREAT BLD-MCNC: 2.29 MG/DL (ref 0.6–1.3)
CREAT SERPL-MCNC: 2.98 MG/DL (ref 0.6–1.3)
CREAT SERPL-MCNC: 3.36 MG/DL (ref 0.6–1.3)
CREAT SERPL-MCNC: 3.38 MG/DL (ref 0.6–1.3)
DIFFERENTIAL METHOD BLD: ABNORMAL
EKG ATRIAL RATE: 100 BPM
EKG ATRIAL RATE: 105 BPM
EKG ATRIAL RATE: 67 BPM
EKG ATRIAL RATE: 71 BPM
EKG ATRIAL RATE: 71 BPM
EKG ATRIAL RATE: 75 BPM
EKG ATRIAL RATE: 94 BPM
EKG DIAGNOSIS: NORMAL
EKG P AXIS: 60 DEGREES
EKG P AXIS: 69 DEGREES
EKG P AXIS: 75 DEGREES
EKG P AXIS: 82 DEGREES
EKG P AXIS: 85 DEGREES
EKG P-R INTERVAL: 376 MS
EKG Q-T INTERVAL: 350 MS
EKG Q-T INTERVAL: 354 MS
EKG Q-T INTERVAL: 358 MS
EKG Q-T INTERVAL: 368 MS
EKG Q-T INTERVAL: 370 MS
EKG Q-T INTERVAL: 378 MS
EKG Q-T INTERVAL: 406 MS
EKG Q-T INTERVAL: 446 MS
EKG QRS DURATION: 118 MS
EKG QRS DURATION: 120 MS
EKG QRS DURATION: 120 MS
EKG QRS DURATION: 122 MS
EKG QRS DURATION: 130 MS
EKG QRS DURATION: 132 MS
EKG QTC CALCULATION (BAZETT): 380 MS
EKG QTC CALCULATION (BAZETT): 384 MS
EKG QTC CALCULATION (BAZETT): 399 MS
EKG QTC CALCULATION (BAZETT): 399 MS
EKG QTC CALCULATION (BAZETT): 415 MS
EKG QTC CALCULATION (BAZETT): 415 MS
EKG QTC CALCULATION (BAZETT): 416 MS
EKG QTC CALCULATION (BAZETT): 501 MS
EKG R AXIS: -8 DEGREES
EKG R AXIS: 67 DEGREES
EKG R AXIS: 68 DEGREES
EKG R AXIS: 72 DEGREES
EKG R AXIS: 72 DEGREES
EKG R AXIS: 75 DEGREES
EKG R AXIS: 76 DEGREES
EKG R AXIS: 78 DEGREES
EKG T AXIS: 121 DEGREES
EKG T AXIS: 162 DEGREES
EKG T AXIS: 185 DEGREES
EKG T AXIS: 207 DEGREES
EKG T AXIS: 210 DEGREES
EKG T AXIS: 242 DEGREES
EKG T AXIS: 65 DEGREES
EKG T AXIS: 85 DEGREES
EKG VENTRICULAR RATE: 63 BPM
EKG VENTRICULAR RATE: 70 BPM
EKG VENTRICULAR RATE: 71 BPM
EKG VENTRICULAR RATE: 73 BPM
EKG VENTRICULAR RATE: 76 BPM
EKG VENTRICULAR RATE: 81 BPM
EOSINOPHIL # BLD: 0 K/UL (ref 0–0.4)
EOSINOPHIL NFR BLD: 0 % (ref 0–5)
ERYTHROCYTE [DISTWIDTH] IN BLOOD BY AUTOMATED COUNT: 13.2 % (ref 11.6–14.5)
FIO2 ON VENT: 70 %
FLUAV SUBTYP SPEC NAA+PROBE: NOT DETECTED
FLUBV RNA SPEC QL NAA+PROBE: NOT DETECTED
GAS FLOW.O2 O2 DELIVERY SYS: ABNORMAL
GLOBULIN SER CALC-MCNC: 2.7 G/DL (ref 2–4)
GLUCOSE BLD STRIP.AUTO-MCNC: 107 MG/DL (ref 70–110)
GLUCOSE BLD STRIP.AUTO-MCNC: 108 MG/DL (ref 70–110)
GLUCOSE BLD STRIP.AUTO-MCNC: 123 MG/DL (ref 70–110)
GLUCOSE BLD STRIP.AUTO-MCNC: 129 MG/DL (ref 70–110)
GLUCOSE BLD STRIP.AUTO-MCNC: 132 MG/DL (ref 70–110)
GLUCOSE BLD STRIP.AUTO-MCNC: 148 MG/DL (ref 70–110)
GLUCOSE BLD STRIP.AUTO-MCNC: 184 MG/DL (ref 70–110)
GLUCOSE BLD STRIP.AUTO-MCNC: 224 MG/DL (ref 70–110)
GLUCOSE BLD STRIP.AUTO-MCNC: 263 MG/DL (ref 70–110)
GLUCOSE BLD STRIP.AUTO-MCNC: 286 MG/DL (ref 70–110)
GLUCOSE BLD STRIP.AUTO-MCNC: 329 MG/DL (ref 70–110)
GLUCOSE BLD-MCNC: 315 MG/DL (ref 74–99)
GLUCOSE SERPL-MCNC: 117 MG/DL (ref 74–99)
GLUCOSE SERPL-MCNC: 201 MG/DL (ref 74–99)
GLUCOSE SERPL-MCNC: 336 MG/DL (ref 74–99)
HADV DNA SPEC QL NAA+PROBE: NOT DETECTED
HCO3 BLD-SCNC: 18.2 MMOL/L (ref 21–28)
HCOV 229E RNA SPEC QL NAA+PROBE: NOT DETECTED
HCOV HKU1 RNA SPEC QL NAA+PROBE: NOT DETECTED
HCOV NL63 RNA SPEC QL NAA+PROBE: NOT DETECTED
HCOV OC43 RNA SPEC QL NAA+PROBE: NOT DETECTED
HCT VFR BLD AUTO: 39.3 % (ref 36–48)
HGB BLD-MCNC: 12.8 G/DL (ref 13–16)
HMPV RNA SPEC QL NAA+PROBE: NOT DETECTED
HPIV1 RNA SPEC QL NAA+PROBE: NOT DETECTED
HPIV2 RNA SPEC QL NAA+PROBE: NOT DETECTED
HPIV3 RNA SPEC QL NAA+PROBE: NOT DETECTED
HPIV4 RNA SPEC QL NAA+PROBE: NOT DETECTED
IMM GRANULOCYTES # BLD AUTO: 0.1 K/UL (ref 0–0.04)
IMM GRANULOCYTES NFR BLD AUTO: 1 % (ref 0–0.5)
LACTATE BLD-SCNC: 7.76 MMOL/L (ref 0.4–2)
LACTATE SERPL-SCNC: 11.7 MMOL/L (ref 0.4–2)
LACTATE SERPL-SCNC: 8.2 MMOL/L (ref 0.4–2)
LYMPHOCYTES # BLD: 0.5 K/UL (ref 0.9–3.6)
LYMPHOCYTES NFR BLD: 5 % (ref 21–52)
M PNEUMO DNA SPEC QL NAA+PROBE: NOT DETECTED
MAGNESIUM SERPL-MCNC: 2.5 MG/DL (ref 1.6–2.6)
MAGNESIUM SERPL-MCNC: 2.5 MG/DL (ref 1.6–2.6)
MAGNESIUM SERPL-MCNC: 2.8 MG/DL (ref 1.6–2.6)
MCH RBC QN AUTO: 29.6 PG (ref 24–34)
MCHC RBC AUTO-ENTMCNC: 32.6 G/DL (ref 31–37)
MCV RBC AUTO: 90.8 FL (ref 78–100)
MONOCYTES # BLD: 0.7 K/UL (ref 0.05–1.2)
MONOCYTES NFR BLD: 7 % (ref 3–10)
NEUTS SEG # BLD: 8.4 K/UL (ref 1.8–8)
NEUTS SEG NFR BLD: 87 % (ref 40–73)
NRBC # BLD: 0 K/UL (ref 0–0.01)
NRBC BLD-RTO: 0 PER 100 WBC
PCO2 BLD: 29.4 MMHG (ref 35–48)
PEEP RESPIRATORY: 8
PH BLD: 7.4 (ref 7.35–7.45)
PHOSPHATE SERPL-MCNC: 2.3 MG/DL (ref 2.5–4.9)
PHOSPHATE SERPL-MCNC: 3.1 MG/DL (ref 2.5–4.9)
PHOSPHATE SERPL-MCNC: 5.2 MG/DL (ref 2.5–4.9)
PLATELET # BLD AUTO: 366 K/UL (ref 135–420)
PMV BLD AUTO: 10.1 FL (ref 9.2–11.8)
PO2 BLD: 160 MMHG (ref 83–108)
POTASSIUM BLD-SCNC: 3.5 MMOL/L (ref 3.5–5.1)
POTASSIUM SERPL-SCNC: 3.2 MMOL/L (ref 3.5–5.5)
POTASSIUM SERPL-SCNC: 3.7 MMOL/L (ref 3.5–5.5)
POTASSIUM SERPL-SCNC: 3.9 MMOL/L (ref 3.5–5.5)
PROT SERPL-MCNC: 5.8 G/DL (ref 6.4–8.2)
RBC # BLD AUTO: 4.33 M/UL (ref 4.35–5.65)
RSV RNA SPEC QL NAA+PROBE: NOT DETECTED
RV+EV RNA SPEC QL NAA+PROBE: DETECTED
SAO2 % BLD: 100 % (ref 94–98)
SARS-COV-2 RNA RESP QL NAA+PROBE: NOT DETECTED
SERVICE CMNT-IMP: ABNORMAL
SERVICE CMNT-IMP: ABNORMAL
SODIUM BLD-SCNC: 140 MMOL/L (ref 136–145)
SODIUM SERPL-SCNC: 134 MMOL/L (ref 136–145)
SODIUM SERPL-SCNC: 138 MMOL/L (ref 136–145)
SODIUM SERPL-SCNC: 141 MMOL/L (ref 136–145)
SPECIMEN SITE: ABNORMAL
TROPONIN I SERPL HS-MCNC: ABNORMAL NG/L (ref 0–78)
TROPONIN I SERPL HS-MCNC: ABNORMAL NG/L (ref 0–78)
VANCOMYCIN SERPL-MCNC: 27.1 UG/ML (ref 5–40)
VANCOMYCIN SERPL-MCNC: 31.5 UG/ML (ref 5–40)
VENTILATION MODE VENT: ABNORMAL
VT SETTING VENT: 450
WBC # BLD AUTO: 9.6 K/UL (ref 4.6–13.2)

## 2024-12-11 PROCEDURE — 95706 EEG WO VID 2-12HR INTMT MNTR: CPT

## 2024-12-11 PROCEDURE — 2500000003 HC RX 250 WO HCPCS: Performed by: PHYSICIAN ASSISTANT

## 2024-12-11 PROCEDURE — 93010 ELECTROCARDIOGRAM REPORT: CPT | Performed by: INTERNAL MEDICINE

## 2024-12-11 PROCEDURE — 82947 ASSAY GLUCOSE BLOOD QUANT: CPT

## 2024-12-11 PROCEDURE — 80069 RENAL FUNCTION PANEL: CPT

## 2024-12-11 PROCEDURE — 6360000002 HC RX W HCPCS: Performed by: STUDENT IN AN ORGANIZED HEALTH CARE EDUCATION/TRAINING PROGRAM

## 2024-12-11 PROCEDURE — 83735 ASSAY OF MAGNESIUM: CPT

## 2024-12-11 PROCEDURE — 94003 VENT MGMT INPAT SUBQ DAY: CPT

## 2024-12-11 PROCEDURE — 6360000002 HC RX W HCPCS: Performed by: PHYSICIAN ASSISTANT

## 2024-12-11 PROCEDURE — 2580000003 HC RX 258: Performed by: PHYSICIAN ASSISTANT

## 2024-12-11 PROCEDURE — 80202 ASSAY OF VANCOMYCIN: CPT

## 2024-12-11 PROCEDURE — 84132 ASSAY OF SERUM POTASSIUM: CPT

## 2024-12-11 PROCEDURE — 85730 THROMBOPLASTIN TIME PARTIAL: CPT

## 2024-12-11 PROCEDURE — APPSS30 APP SPLIT SHARED TIME 16-30 MINUTES: Performed by: PHYSICIAN ASSISTANT

## 2024-12-11 PROCEDURE — 82803 BLOOD GASES ANY COMBINATION: CPT

## 2024-12-11 PROCEDURE — 83605 ASSAY OF LACTIC ACID: CPT

## 2024-12-11 PROCEDURE — 94761 N-INVAS EAR/PLS OXIMETRY MLT: CPT

## 2024-12-11 PROCEDURE — 2700000000 HC OXYGEN THERAPY PER DAY

## 2024-12-11 PROCEDURE — 36600 WITHDRAWAL OF ARTERIAL BLOOD: CPT

## 2024-12-11 PROCEDURE — 85014 HEMATOCRIT: CPT

## 2024-12-11 PROCEDURE — 84484 ASSAY OF TROPONIN QUANT: CPT

## 2024-12-11 PROCEDURE — XX20X89 MONITORING OF BRAIN ELECTRICAL ACTIVITY, COMPUTER-AIDED DETECTION AND NOTIFICATION, NEW TECHNOLOGY GROUP 9: ICD-10-PCS | Performed by: INTERNAL MEDICINE

## 2024-12-11 PROCEDURE — 80076 HEPATIC FUNCTION PANEL: CPT

## 2024-12-11 PROCEDURE — 36592 COLLECT BLOOD FROM PICC: CPT

## 2024-12-11 PROCEDURE — 85025 COMPLETE CBC W/AUTO DIFF WBC: CPT

## 2024-12-11 PROCEDURE — 6370000000 HC RX 637 (ALT 250 FOR IP): Performed by: PHYSICIAN ASSISTANT

## 2024-12-11 PROCEDURE — 84295 ASSAY OF SERUM SODIUM: CPT

## 2024-12-11 PROCEDURE — 82330 ASSAY OF CALCIUM: CPT

## 2024-12-11 PROCEDURE — 84100 ASSAY OF PHOSPHORUS: CPT

## 2024-12-11 PROCEDURE — 80048 BASIC METABOLIC PNL TOTAL CA: CPT

## 2024-12-11 PROCEDURE — 2500000003 HC RX 250 WO HCPCS

## 2024-12-11 PROCEDURE — 0202U NFCT DS 22 TRGT SARS-COV-2: CPT

## 2024-12-11 PROCEDURE — 82962 GLUCOSE BLOOD TEST: CPT

## 2024-12-11 PROCEDURE — 2720000010 HC SURG SUPPLY STERILE

## 2024-12-11 RX ORDER — MORPHINE SULFATE 4 MG/ML
4 INJECTION, SOLUTION INTRAMUSCULAR; INTRAVENOUS
Status: DISCONTINUED | OUTPATIENT
Start: 2024-12-11 | End: 2024-12-11 | Stop reason: HOSPADM

## 2024-12-11 RX ORDER — 0.9 % SODIUM CHLORIDE 0.9 %
500 INTRAVENOUS SOLUTION INTRAVENOUS ONCE
Status: COMPLETED | OUTPATIENT
Start: 2024-12-11 | End: 2024-12-11

## 2024-12-11 RX ORDER — GLYCOPYRROLATE 0.2 MG/ML
0.2 INJECTION INTRAMUSCULAR; INTRAVENOUS EVERY 4 HOURS PRN
Status: DISCONTINUED | OUTPATIENT
Start: 2024-12-11 | End: 2024-12-11 | Stop reason: HOSPADM

## 2024-12-11 RX ORDER — LORAZEPAM 2 MG/ML
1 INJECTION INTRAMUSCULAR EVERY 10 MIN PRN
Status: DISCONTINUED | OUTPATIENT
Start: 2024-12-11 | End: 2024-12-11 | Stop reason: HOSPADM

## 2024-12-11 RX ORDER — DEXTROSE MONOHYDRATE 100 MG/ML
INJECTION, SOLUTION INTRAVENOUS CONTINUOUS PRN
Status: DISCONTINUED | OUTPATIENT
Start: 2024-12-11 | End: 2024-12-11

## 2024-12-11 RX ORDER — DEXTROSE MONOHYDRATE 100 MG/ML
INJECTION, SOLUTION INTRAVENOUS CONTINUOUS PRN
Status: DISCONTINUED | OUTPATIENT
Start: 2024-12-11 | End: 2024-12-11 | Stop reason: SDUPTHER

## 2024-12-11 RX ORDER — POTASSIUM CHLORIDE 29.8 MG/ML
20 INJECTION INTRAVENOUS
Status: COMPLETED | OUTPATIENT
Start: 2024-12-11 | End: 2024-12-11

## 2024-12-11 RX ORDER — MORPHINE SULFATE 2 MG/ML
2 INJECTION, SOLUTION INTRAMUSCULAR; INTRAVENOUS EVERY 10 MIN PRN
Status: DISCONTINUED | OUTPATIENT
Start: 2024-12-11 | End: 2024-12-11 | Stop reason: HOSPADM

## 2024-12-11 RX ORDER — ACETAMINOPHEN 650 MG/1
650 SUPPOSITORY RECTAL EVERY 4 HOURS PRN
Status: DISCONTINUED | OUTPATIENT
Start: 2024-12-11 | End: 2024-12-11

## 2024-12-11 RX ORDER — INSULIN LISPRO 100 [IU]/ML
0-4 INJECTION, SOLUTION INTRAVENOUS; SUBCUTANEOUS EVERY 6 HOURS
Status: DISCONTINUED | OUTPATIENT
Start: 2024-12-11 | End: 2024-12-11

## 2024-12-11 RX ADMIN — POTASSIUM CHLORIDE 20 MEQ: 29.8 INJECTION, SOLUTION INTRAVENOUS at 11:23

## 2024-12-11 RX ADMIN — GLYCOPYRROLATE 0.2 MG: 0.2 INJECTION INTRAMUSCULAR; INTRAVENOUS at 14:50

## 2024-12-11 RX ADMIN — PIPERACILLIN AND TAZOBACTAM 3375 MG: 3; .375 INJECTION, POWDER, LYOPHILIZED, FOR SOLUTION INTRAVENOUS at 10:08

## 2024-12-11 RX ADMIN — ESMOLOL HYDROCHLORIDE IN SODIUM CHLORIDE 50 MCG/KG/MIN: 10 INJECTION INTRAVENOUS at 09:05

## 2024-12-11 RX ADMIN — FAMOTIDINE 20 MG: 10 INJECTION, SOLUTION INTRAVENOUS at 08:19

## 2024-12-11 RX ADMIN — CHLORHEXIDINE GLUCONATE 0.12% ORAL RINSE 15 ML: 1.2 LIQUID ORAL at 08:19

## 2024-12-11 RX ADMIN — MAGNESIUM SULFATE HEPTAHYDRATE 2000 MG: 40 INJECTION, SOLUTION INTRAVENOUS at 00:15

## 2024-12-11 RX ADMIN — POTASSIUM CHLORIDE 20 MEQ: 29.8 INJECTION, SOLUTION INTRAVENOUS at 10:34

## 2024-12-11 RX ADMIN — AMIODARONE HYDROCHLORIDE 1 MG/MIN: 1.8 INJECTION, SOLUTION INTRAVENOUS at 00:23

## 2024-12-11 RX ADMIN — PIPERACILLIN AND TAZOBACTAM 3375 MG: 3; .375 INJECTION, POWDER, LYOPHILIZED, FOR SOLUTION INTRAVENOUS at 01:48

## 2024-12-11 RX ADMIN — INSULIN LISPRO 2 UNITS: 100 INJECTION, SOLUTION INTRAVENOUS; SUBCUTANEOUS at 01:44

## 2024-12-11 RX ADMIN — INSULIN HUMAN 6.1 UNITS/HR: 1 INJECTION, SOLUTION INTRAVENOUS at 05:53

## 2024-12-11 RX ADMIN — AMIODARONE HYDROCHLORIDE 1 MG/MIN: 1.8 INJECTION, SOLUTION INTRAVENOUS at 06:00

## 2024-12-11 RX ADMIN — CHLORHEXIDINE GLUCONATE 0.12% ORAL RINSE 15 ML: 1.2 LIQUID ORAL at 01:15

## 2024-12-11 RX ADMIN — AMIODARONE HYDROCHLORIDE 1 MG/MIN: 1.8 INJECTION, SOLUTION INTRAVENOUS at 12:08

## 2024-12-11 RX ADMIN — SODIUM CHLORIDE 500 ML: 9 INJECTION, SOLUTION INTRAVENOUS at 10:38

## 2024-12-11 RX ADMIN — INSULIN HUMAN 5.4 UNITS/HR: 1 INJECTION, SOLUTION INTRAVENOUS at 04:39

## 2024-12-11 RX ADMIN — MORPHINE SULFATE 4 MG: 4 INJECTION, SOLUTION INTRAMUSCULAR; INTRAVENOUS at 14:51

## 2024-12-11 ASSESSMENT — PULMONARY FUNCTION TESTS
PIF_VALUE: 25
PIF_VALUE: 22

## 2024-12-11 NOTE — CONSULTS
reviewed:  labs, images, records, medication use, vitals, and chart     FINAL COMMENTS   Thank you for allowing Palliative Medicine to participate in the care of Joseph LIMON Tom.    Only check if applicable and billing time based rather than MDM  [x] The total encounter time on this service date was __55__ minutes which was spent performing a face-to-face encounter and personally completing the provider-level activities documented in the note. This includes time spent prior to the visit and after the visit in direct care of the patient. This time does not include time spent in any separately reportable services.    Electronically signed by   SUZIE Dowell NP  Palliative Care Team  on 12/11/2024 at 3:54 PM

## 2024-12-11 NOTE — PROGRESS NOTES
VENTILATOR CARE PLAN    Problem: Ventilator Management  Goal: *Adequate oxygenation/ ventilation/ and extubation      Patient:        Joseph Santos     59 y.o.   male     12/11/2024  5:25 AM  Patient Active Problem List   Diagnosis    Type 2 diabetes mellitus with peripheral neuropathy (HCC)    Second degree AV block    Hyperlipidemia    Second degree AV block, Mobitz type I    Microalbuminuria due to type 2 diabetes mellitus (HCC)    Coronary artery disease involving native coronary artery of native heart without angina pectoris    Mild nonproliferative diabetic retinopathy of both eyes ([facility])    Ischemic cardiomyopathy    Severe single current episode of major depressive disorder, without psychotic features ([facility])    Cubital tunnel syndrome, left    Essential hypertension with goal blood pressure less than 140/90    Type 2 diabetes mellitus with diabetic neuropathy ([facility])    Primary osteoarthritis involving multiple joints    Cubital tunnel syndrome, right    Acute systolic heart failure (HCC)    Syncope and collapse    Cardiac arrest       Cardiac arrest [I46.9]    Reason patient intubated: Cardiac arrest    Ventilator day: 2    Ventilator settings: AC/PRVC 450/24/8/50%    ETT Size/Placement: 7.5 / 23@lips        PLAN OF CARE: Continue Vent support.       GOAL: Adequate oxygenation/ ventilation/ and extubation        ABG:   Latest Reference Range & Units 12/11/24 03:27   POC pH 7.35 - 7.45   7.40   POC pCO2 35.0 - 48.0 MMHG 29.4 (L)   POC PO2 83 - 108 MMHG 160 (H)   POC HCO3 21 - 28 MMOL/L 18.2 (L)   POC O2 SAT 94 - 98 % 100 (H)   POC TCO2 22 - 29 MMOL/L 17 (L)   (L): Data is abnormally low  (H): Data is abnormally high    Chest X-ray:  Narrative & Impression  CHEST PORTABLE      INDICATIONS: Cardiac arrest     COMPARISON: CT 6/11/2024, 6/9/2024 radiographs     FINDINGS:      Support lines/catheters: Tip of the endotracheal tube projects roughly 3 to 4 cm  above the phyllis. Enteric tube below the diaphragm with

## 2024-12-11 NOTE — H&P
clopidogrel (PLAVIX) 75 MG tablet Take 1 tablet by mouth daily    Michael Rick MD   FARXIGA 10 MG tablet Take 1 tablet by mouth daily 3/8/23   Michael Rick MD   pregabalin (LYRICA) 100 MG capsule Take 1 capsule by mouth in the morning, at noon, and at bedtime. 3/1/23   Michael Rick MD   hydroCHLOROthiazide (HYDRODIURIL) 12.5 MG tablet Take 1 tablet by mouth daily 23   Michael Rick MD   Lancets MISC Use daily as directed 17   Automatic Reconciliation, Ar   Multiple Vitamin (MULTIVITAMIN PO) Take 1 tablet by mouth daily CENTRUM SILVER    Automatic Reconciliation, Ar   amLODIPine (NORVASC) 10 MG tablet Take 1 tablet by mouth daily 19   Automatic Reconciliation, Ar   aspirin 81 MG chewable tablet Take 1 tablet by mouth daily    Automatic Reconciliation, Ar   fenofibrate (TRICOR) 145 MG tablet Take 1 tablet by mouth daily 19   Automatic Reconciliation, Ar   insulin glargine (LANTUS SOLOSTAR) 100 UNIT/ML injection pen Inject 25 Units into the skin nightly 19   Automatic Reconciliation, Ar   ramipril (ALTACE) 10 MG capsule Take 1 capsule by mouth daily 10/22/19   Automatic Reconciliation, Ar   rOPINIRole (REQUIP) 0.5 MG tablet Take 1 tablet by mouth daily    Automatic Reconciliation, Ar   sertraline (ZOLOFT) 50 MG tablet Take 1 tablet by mouth daily 19   Automatic Reconciliation, Ar   traZODone (DESYREL) 100 MG tablet Take 1 tablet by mouth nightly 10/22/19   Automatic Reconciliation, Ar       [unfilled]    Allergies   Allergen Reactions    Other Other (See Comments)     Surgical suture- redness        Social History     Tobacco Use    Smoking status: Former     Current packs/day: 0.00     Types: Cigarettes     Quit date: 2007     Years since quittin.8    Smokeless tobacco: Never   Substance Use Topics    Alcohol use: No        Family History   Problem Relation Age of Onset    Heart Disease Mother     Heart Disease Maternal Grandfather

## 2024-12-11 NOTE — PROGRESS NOTES
Comprehensive Nutrition Assessment    Type and Reason for Visit:  Initial, Consult, NPO/clear liquid    Nutrition Recommendations/Plan:   Continue NPO.   Daily wts.   Continue to monitor readiness to initiate nutrition support, weight, labs, and POC while admitted.      Malnutrition Assessment:  Malnutrition Status:  Insufficient data (vs at risk, NPO/Vent) (12/11/24 1316)    Context:  Acute Illness       Nutrition History and Allergies:      Past Medical History:   Diagnosis Date    Abnormal nuclear cardiac imaging test 01/31/2012    Mod inferior infarction w/mild-mod bright-infarct ischemia.  LVE.  EF 43%.  Marked basal inferior hypk; otherwise mild-mod inferior, inferolateral, distal anteroapical hypk.  TID index 1.07.  Pos max EST suggests subtotal obstruction of RCA.      Arthritis     Calculus of kidney     Chronic pain     Coronary atherosclerosis of native coronary artery     Inferior MI in 1998 status post RCA stent; ISR in 1999 status post rotational atherectomy. Posterior wall MI 2002 with circumflex stent.    Depression     Dyslipidemia     History of echocardiogram 10/09/2012    EF 50-55%.  No WMA.  Gr 1 DDfx.  RVSP normal.  LAE.  IVCE.      Hyperlipidemia     Hypertension     mild    Long term current use of anticoagulant therapy     Neuropathy     Renal artery stenosis (HCC) 12/02/2013    No significant RA stenosis.  Patent bilateral renal veins w/o thrombosis.      S/P cardiac cath 02/17/2012    %.  ISR.  LM patent.  LAD patent.  pD1 55%.  mCX patent.  OM1 80% (3 x 30 [place] stent o/lapped w/3 x 12 [place] stent; residual 0%).  LVEDP 13.  EF 50-55%.      S/P coronary angioplasty 10/08/2012    Unsuccessful angioplasty of chronic RCA occlusion w/collaterals.    Second degree AV block, Mobitz type I     Chronic/recurrent    Testicular torsion    PMHx of opioid use    Pt presented in ED in cardiac arrest, found unresponsive by wife.     Weight Hx: per EMR review, Wt change: +8.2 lb (4.3%) x >30

## 2024-12-11 NOTE — ED NOTES
Patient had another run of VTACH with HR >200 lasting approximately 1 minute. Provider at the bedside to cardiovert. Patient able to spontaneously convert without being shocked.

## 2024-12-11 NOTE — PROCEDURES
Joseph Santos  1965  [id number]  12/10/2024    Indication: Need for vasopressors    Line was placed emergently due to patient condition.  A time out was completed.    Central line Bundle:   Full sterile barrier precautions used.  7-Step Sterility Protocol followed.  (cap, mask sterile gown, sterile gloves, large sterile sheet, hand hygiene, 2% chlorhexidine for cutaneous antisepsis)    Using ultrasound guidance,   Right femoral cannulated x 2 attempt(s) and utilizing the Seldinger technique, a 16 cm, triple lumen CVL was placed with moderate difficulty.    Patient tolerated procedure well.    Good, dark red,  non pulsatile blood return noted.  Catheter secured & Biopatch applied.  Sterile Tegaderm placed.        Dr. Gayle was available for duration of procedure.    Camron Bassett MD  [facility] Emergency Medicine PGY-1    December 10, 2024 7:06 PM       Dragon medical dictation software was used for portions of this report. Unintended voice transcription errors may have occurred.

## 2024-12-11 NOTE — PLAN OF CARE
Problem: Chronic Conditions and Co-morbidities  Goal: Patient's chronic conditions and co-morbidity symptoms are monitored and maintained or improved  Outcome: Not Progressing  Flowsheets (Taken 12/11/2024 0000)  Care Plan - Patient's Chronic Conditions and Co-Morbidity Symptoms are Monitored and Maintained or Improved:   Monitor and assess patient's chronic conditions and comorbid symptoms for stability, deterioration, or improvement   Collaborate with multidisciplinary team to address chronic and comorbid conditions and prevent exacerbation or deterioration   Update acute care plan with appropriate goals if chronic or comorbid symptoms are exacerbated and prevent overall improvement and discharge     Problem: Discharge Planning  Goal: Discharge to home or other facility with appropriate resources  Outcome: Not Progressing  Flowsheets (Taken 12/11/2024 0000)  Discharge to home or other facility with appropriate resources:   Identify barriers to discharge with patient and caregiver   Arrange for needed discharge resources and transportation as appropriate

## 2024-12-11 NOTE — PROGRESS NOTES
met patient and family members at bedside.    Patient's family asked  to come to the bedside to offer prayer before patient is extubated. Family members thanked  for the visit.     provided presence, support, prayer, and assurance of continued prayer.    Chaplains will provide follow-up care for patient and family as needed.    Spiritual Health History and Assessment/Progress Note  [facility]    Crisis, Family Care, Family Care,  ,      Name: Joseph Santos MRN: [id number]    Age: 59 y.o.     Sex: male   Language: English   Adventism: Gnosticism   Cardiac arrest     Date: 12/11/2024            Total Time Calculated: 20 min              Spiritual Assessment began in [facility] 3 INTENSIVE CARE UNIT        Referral/Consult From: Family, Nurse   Encounter Overview/Reason: Crisis, Family Care  Service Provided For: Patient, Family    Kirsty, Belief, Meaning:   Patient unable to assess at this time  Family/Friends identify as spiritual      Importance and Influence:  Patient unable to assess at this time  Family/Friends No family/friends present    Community:  Patient Other: Unable to assess.  Family/Friends feel well-supported. Support system includes: Spouse/Partner, Parent/s, Children, and Extended family    Assessment and Plan of Care:     Patient Interventions include: Other: Unable to assess.  Family/Friends Interventions include: Facilitated expression of thoughts and feelings, Explored spiritual coping/struggle/distress, and Affirmed coping skills/support systems    Patient Plan of Care: No future visits per patient/family request  Family/Friends Plan of Care: No future visits per patient/family request    Electronically signed by Chaplain Pauline on 12/11/2024 at 3:21 PM

## 2024-12-11 NOTE — ED NOTES
Patient had approx. 2min of monomorphic Vtach. Provider at the bedside to cardiovert. Patient able to spontaneously convert without cardioversion. EKG ordered and performed.

## 2024-12-11 NOTE — ED PROVIDER NOTES
Patient seen and examined with the resident and patient is a history of coronary disease, secondary AV block, diabetes, chronically occluded right coronary artery on 2012 catheterization, presents emergency department for being found unresponsive at home.  The patient arrived with supraglottic device in place, vomitus on his face, and with pulseless arrest.  The patient responded to CPR, is intubated, and had a return of spontaneous circulation with an episode of sustained ventricular tachycardia requiring a synchronized cardioversion.  The patient has initial EKG showing inferior ST elevations however given the posterior status stabilization continued and repeat EKG showed persistent ST elevations.  Discussed the case with cardiology and the case was reviewed with Dr. Thakur who is not the invasive cardiologist and recommends activation of STEMI if the patient has persistent ST elevations as this is likely a primary cardiac event.    We repeated the EKG and ST elevations are improving and the case was reviewed by Dr. Thakur and says it does not appear to be a STEMI and recommends continuing to aggressively treat patient's metabolic derangements from the cardiac arrest.  Cardiology will follow the patient closely during this time and if the patient has any ST elevations 1 stabilized then catheterization can be considered.  Recommends admission to the ICU for continued care.    Please see the documentation by the resident and timeline. Pardeep Gayle DO 6:23 PM       Pardeep Gayle MD  12/13/24 0136    
procedures   Due to a high probability of clinically significant, life threatening deterioration, the patient required my highest level of preparedness to intervene emergently and I personally spent this critical care time directly and personally managing the patient. This critical care time included obtaining a history; examining the patient; pulse oximetry; ordering and review of studies; arranging urgent treatment with development of a management plan; evaluation of patient's response to treatment; frequent reassessment; and, discussions with other providers.  This critical care time was performed to assess and manage the high probability of imminent, life-threatening deterioration that could result in multi-organ failure. It was exclusive of separately billable procedures and treating other patients and teaching time.   [KS]   2256 Patient found to have another bout of atrial tachycardia.  Patient cardioverted at 200 J.  Patient remains in critical condition. [KS]   2312 Patient found to be in an electrical storm will initiate esmolol bolus and drip.  [KS]   2319 Discussed workup with Dr. Thakur.  Recommendations to start patient on lidocaine bolus .  Will proceed to initiate lidocaine drip as well 2.  Will continue to monitor patient. [KS]   2320 Of note patient has been in V. tach 3 times in the past hour. [KS]   2321 Recommendations to add magnesium 2 g  [KS]      ED Course User Index  [KS] Nick Velazco MD Sarpong, Keneth, MD  12/12/24 2234    
disposition to intensive care unit.  8:42 PM      Chronic Conditions Affecting Care:   Past Medical History:   Diagnosis Date    Abnormal nuclear cardiac imaging test 01/31/2012    Mod inferior infarction w/mild-mod bright-infarct ischemia.  LVE.  EF 43%.  Marked basal inferior hypk; otherwise mild-mod inferior, inferolateral, distal anteroapical hypk.  TID index 1.07.  Pos max EST suggests subtotal obstruction of RCA.      Arthritis     Calculus of kidney     Chronic pain     Coronary atherosclerosis of native coronary artery     Inferior MI in 1998 status post RCA stent; ISR in 1999 status post rotational atherectomy. Posterior wall MI 2002 with circumflex stent.    Depression     Dyslipidemia     History of echocardiogram 10/09/2012    EF 50-55%.  No WMA.  Gr 1 DDfx.  RVSP normal.  LAE.  IVCE.      Hyperlipidemia     Hypertension     mild    Long term current use of anticoagulant therapy     Neuropathy     Renal artery stenosis (HCC) 12/02/2013    No significant RA stenosis.  Patent bilateral renal veins w/o thrombosis.      S/P cardiac cath 02/17/2012    %.  ISR.  LM patent.  LAD patent.  pD1 55%.  mCX patent.  OM1 80% (3 x 30 [place] stent o/lapped w/3 x 12 [place] stent; residual 0%).  LVEDP 13.  EF 50-55%.      S/P coronary angioplasty 10/08/2012    Unsuccessful angioplasty of chronic RCA occlusion w/collaterals.    Second degree AV block, Mobitz type I     Chronic/recurrent    Testicular torsion      Critical Care Time: 60 mins    None    Diagnosis and Disposition     CLINICAL IMPRESSION:  No diagnosis found.     Medication List        CONTINUE taking these medications      FreeStyle Lorraine 2 Sensor Misc     Lancets Misc     OneTouch Verio Reflect w/Device Kit            ASK your doctor about these medications      amLODIPine 10 MG tablet  Commonly known as: NORVASC     aspirin 81 MG chewable tablet     atorvastatin 40 MG tablet  Commonly known as: LIPITOR  TAKE 1 TABLET BY MOUTH EVERY DAY

## 2024-12-11 NOTE — DISCHARGE SUMMARY
AM   Result Value Ref Range    POC pH 7.40 7.35 - 7.45      POC pCO2 29.4 (L) 35.0 - 48.0 MMHG    POC PO2 160 (H) 83 - 108 MMHG    POC Ionized Calcium 1.20 1.15 - 1.33 mmol/L    Base Deficit (POC) 5.5 mmol/L    POC HCO3 18.2 (L) 21 - 28 MMOL/L    POC TCO2 17 (L) 22 - 29 MMOL/L    POC O2  (H) 94 - 98 %    Source ARTERIAL      Site RIGHT RADIAL      Bud Test Positive      DEVICE ADULT VENT      Mode ASSIST CONTROL      FIO2 Arterial 70 %    POC TIDAL VOLUME 450      POC PEEP/CPA 8      Performed by: Lavelle Lew     POC Sodium 140 136 - 145 mmol/L    POC Potassium 3.5 3.5 - 5.1 mmol/L    POC Glucose 315 (H) 74 - 99 mg/dL    POC Creatinine 2.29 (H) 0.6 - 1.3 mg/dL    POC Lactic Acid 7.76 (HH) 0.40 - 2.00 mmol/L    POC Chloride 99 98 - 107 mmol/L    Anion Gap, POC Cannot be calculated  Cannot be calculated   10 - 20 mmol/L    eGFR, POC 32 (L) >60 ml/min/1.73m2    Critical Value Read Back MARYANNE GUEVARA    POCT Glucose    Collection Time: 12/11/24  4:30 AM   Result Value Ref Range    POC Glucose 329 (H) 70 - 110 mg/dL   POCT Glucose    Collection Time: 12/11/24  5:51 AM   Result Value Ref Range    POC Glucose 263 (H) 70 - 110 mg/dL   POCT Glucose    Collection Time: 12/11/24  6:59 AM   Result Value Ref Range    POC Glucose 224 (H) 70 - 110 mg/dL   Lactic Acid    Collection Time: 12/11/24  8:00 AM   Result Value Ref Range    Lactic Acid, Plasma 8.2 (HH) 0.4 - 2.0 MMOL/L   Troponin    Collection Time: 12/11/24  8:00 AM   Result Value Ref Range    Troponin, High Sensitivity 28,334 (HH) 0 - 78 ng/L   APTT    Collection Time: 12/11/24  8:00 AM   Result Value Ref Range    APTT 75.3 (H) 23.0 - 36.4 SEC   Magnesium    Collection Time: 12/11/24  8:00 AM   Result Value Ref Range    Magnesium 2.5 1.6 - 2.6 mg/dL   Vancomycin Level, Random    Collection Time: 12/11/24  8:00 AM   Result Value Ref Range    Vancomycin Rm 27.1 5.0 - 40.0 UG/ML   Renal Function Panel    Collection Time: 12/11/24  8:00 AM   Result Value Ref Range

## 2024-12-11 NOTE — PROGRESS NOTES
PCCM Update:    Case discussed extensively at bedside with patient's son and wife. I explained the multiple reasons for the ICU admission, cardiac arrest with prolonged downtime and that patient was in multiorgan failure. I also discussed patient's poor neurological exam as well as devastating CT head findings leading to a very poor prognosis. They stated understanding and wife stated \"He would not want to live like this\". I explained DNR vs comfort care measures to them. Family stated they would like to discuss it further amongst themselves prior to making a decision.    Addendum - Family has decided to move forward with CMO at this time.    Ivy Alexander PA-C  12/11/24  Pulmonary, Critical Care Medicine  [facility]

## 2024-12-11 NOTE — ED NOTES
TRANSFER - OUT REPORT:    Verbal report given to ALEXANDRE Poole on Joseph Santos  being transferred to ICU for routine progression of patient care       Report consisted of patient's Situation, Background, Assessment and   Recommendations(SBAR).     Information from the following report(s) ED Encounter Summary, ED SBAR, MAR, and Recent Results was reviewed with the receiving nurse.    Tem[place] Fall Assessment:    Presents to emergency department  because of falls (Syncope, seizure, or loss of consciousness): No  Age > 70: No  Altered Mental Status, Intoxication with alcohol or substance confusion (Disorientation, impaired judgment, poor safety awaremess, or inability to follow instructions): Yes  Impaired Mobility: Ambulates or transfers with assistive devices or assistance; Unable to ambulate or transer.: Yes  Nursing Judgement: Yes          Lines:   Peripheral IV 12/10/24 Distal;Left;Anterior Cephalic (Active)       Peripheral IV 12/10/24 Right Antecubital (Active)   Site Assessment Clean, dry & intact 12/10/24 1919   Line Status Blood return noted;Specimen collected 12/10/24 1919   Phlebitis Assessment No symptoms 12/10/24 1919        CRITICAL LABS:     Verbally repeated the following test results Lab/POC: Troponin, Lactic Acid to (name of nurse).    Tawanna Jarvis RN     Opportunity for questions and clarification was provided.      Patient transported with:  Registered Nurse, Monitor

## 2024-12-11 NOTE — PROGRESS NOTES
Death Pronouncement Note    Patient lying in bed, mouth open, eyes closed.  Pupils fixed and equal bilaterally.  No response to verbal or painful stimuli.  No heart or lung sounds auscultated.  No carotid or peripheral pulses.    Death officially pronounced at 15:17, 12/11/2024    Medical Examiner notified: Yes,declined by ME Ramses Vaughan.    Family Notified: YES      Ivy Alexander PA-C  12/11/24  Pulmonary, Critical Care Medicine  [facility]

## 2024-12-11 NOTE — PROGRESS NOTES
[facility]   Pharmacy Pharmacokinetic Monitoring Service - Vancomycin    Indication: Sepsis of Unknown Etiology  Target concentration: Dosing based on anticipated concentration <15 mg/L due to renal impairment/insufficiency  Day of Therapy: 2  Additional Antimicrobials: Piperacillin/Tazobactam    Pertinent Laboratory Values:   Wt Readings from Last 1 Encounters:   12/10/24 89.4 kg (197 lb 3.2 oz)     Temp Readings from Last 1 Encounters:   12/11/24 (!) 100.9 °F (38.3 °C)     Estimated Creatinine Clearance: 36 mL/min (A) (based on SCr of 2.29 mg/dL (H)).  Recent Labs     12/10/24  1702 12/10/24  1714 12/11/24  0132 12/11/24  0327   CREATININE 2.21*   < > 2.98* 2.29*   BUN 21*  --  32*  --    WBC 15.1*  --  9.6  --    PROCAL <0.05  --   --   --     < > = values in this interval not displayed.       Pertinent Cultures:  Date Source Results   12/10 Blood NGTD   MRSA Nasal Swab: was ordered by provider, awaiting results.    Assessment:  Date Current Dose Level (mg/L) Timing of Level (h)   12/10 2000 mg x 1 - -   12/11 - 31.5 4.5   Note: Serum concentrations collected for AUC dosing may appear elevated if collected in close proximity to the dose administered, this is not necessarily an indication of toxicity    Plan:  Dose by level  No dose needed today  Ordered a level for 12/12 with AM labs  Pharmacy will continue to monitor patient and adjust therapy as indicated    Thank you for the consult,  Oswaldo Cai RPH  12/11/2024 7:44 AM

## 2024-12-12 LAB
BACTERIA SPEC CULT: NORMAL
BACTERIA SPEC CULT: NORMAL
SERVICE CMNT-IMP: NORMAL

## 2024-12-15 LAB
BACTERIA SPEC CULT: NORMAL
BACTERIA SPEC CULT: NORMAL
SERVICE CMNT-IMP: NORMAL
SERVICE CMNT-IMP: NORMAL

## 2025-01-03 NOTE — PROGRESS NOTES
EEG Session Report  Patient Name: LUNA Warner EEG  8 channel recording  Medical ID: [id number]  YOB: 1965  Age: 59  Session Duration:  Dec 11, 2024 12:39 AM - Dec 11, 2024 3:12 AM  Recording Total Time: 02:32:34  Ordering Physician: BRAYDON  Primary Indication: Cardiac Arrest  Location: ICU/MICU  Impressions: severe generalized slowing with minimal reactivity and occasional burst suppression pattern. No electrographic seizures  Comments:   Report prepared by: N/A  Report generated on: Jan 2, 2025 10:34 PM [facility]-

## (undated) DEVICE — FLEX ADVANTAGE 3000CC: Brand: FLEX ADVANTAGE

## (undated) DEVICE — BANDAGE COMPR EXSANGUATION SGL LAYERED NO CLSR 9FT LEN 4IN W

## (undated) DEVICE — BNDG,ELSTC,MATRIX,STRL,4"X5YD,LF,HOOK&LP: Brand: MEDLINE

## (undated) DEVICE — BNDG ELAS HK LOOP 4X5YD NS -- MATRIX

## (undated) DEVICE — CURITY NON-ADHERENT STRIPS: Brand: CURITY

## (undated) DEVICE — SUTURE MCRYL SZ 4-0 L18IN ABSRB UD L19MM PS-2 3/8 CIR PRIM Y496G

## (undated) DEVICE — STERILE POLYISOPRENE POWDER-FREE SURGICAL GLOVES: Brand: PROTEXIS

## (undated) DEVICE — PACK PROCEDURE SURG MAJ W/ BASIN LF

## (undated) DEVICE — NEEDLE ELECTRODE: Brand: EDGE

## (undated) DEVICE — Z DISCONTINUED PER MEDLINE READYPREP SYS NSL SWAB

## (undated) DEVICE — BNDG ELAS HK LOOP 3X5YD NS -- MATRIX

## (undated) DEVICE — Device

## (undated) DEVICE — SUTURE VCRL RAPIDE SZ 4-0 L18IN ABSRB UD L19MM PS-2 1/2 CIR VR496

## (undated) DEVICE — GARMENT,MEDLINE,DVT,SEQUENTIAL,CALF,MD: Brand: MEDLINE

## (undated) DEVICE — REM POLYHESIVE ADULT PATIENT RETURN ELECTRODE: Brand: VALLEYLAB

## (undated) DEVICE — DRSG GZ OIL EMUL CURAD 3X3 --

## (undated) DEVICE — INTENDED FOR TISSUE SEPARATION, AND OTHER PROCEDURES THAT REQUIRE A SHARP SURGICAL BLADE TO PUNCTURE OR CUT.: Brand: BARD-PARKER SAFETY BLADES SIZE 15, STERILE

## (undated) DEVICE — BLADE OPHTH MINI BEAV SHRP --

## (undated) DEVICE — GAUZE,SPONGE,4"X4",16PLY,STRL,LF,10/TRAY: Brand: MEDLINE

## (undated) DEVICE — PADDING CAST W4INXL4YD ST COT COHESIVE HND TEARABLE SPEC

## (undated) DEVICE — CATH IV SAFE STR 22GX1IN BLU -- PROTECTIV PLUS

## (undated) DEVICE — MEDI-VAC NON-CONDUCTIVE SUCTION TUBING: Brand: CARDINAL HEALTH

## (undated) DEVICE — SYR LR LCK 1ML GRAD NSAF 30ML --

## (undated) DEVICE — GARMENT,MEDLINE,DVT,INT,CALF,MED, GEN2: Brand: MEDLINE

## (undated) DEVICE — FLEX ADVANTAGE 1500CC: Brand: FLEX ADVANTAGE

## (undated) DEVICE — BANDAGE,ELASTIC,ESMARK,STERILE,4"X9',LF: Brand: MEDLINE

## (undated) DEVICE — TRNQT RMFG CUFF 2P 18IN W/SLV --

## (undated) DEVICE — DRAPE,HAND,STERILE: Brand: MEDLINE

## (undated) DEVICE — BIPOLAR FORCEPS CORD: Brand: VALLEYLAB

## (undated) DEVICE — PAD,ABDOMINAL,8"X10",ST,LF: Brand: MEDLINE

## (undated) DEVICE — PREP SKN CHLRAPRP APL 26ML STR --

## (undated) DEVICE — AIRLIFE™ NASAL OXYGEN CANNULA CURVED, NONFLARED TIP WITH 14 FOOT (4.3 M) CRUSH-RESISTANT TUBING, OVER-THE-EAR STYLE: Brand: AIRLIFE™

## (undated) DEVICE — GOWN CHEMO 2XL BLU POLY MULTILAYER COAT ISOLATN W HK LOOP

## (undated) DEVICE — BLANKET WRM AD W50XL85.8IN PACU FULL BODY FORC AIR

## (undated) DEVICE — KIT CLN UP BON SECOURS MARYV

## (undated) DEVICE — CANNULA PERF L2IN BLNT TIP 2MM VES CLR RADPQ BODY FEM LUER

## (undated) DEVICE — SOLUTION IV 1000ML 0.9% SOD CHL

## (undated) DEVICE — SUTURE MCRYL SZ 3-0 L27IN ABSRB UD L26MM SH 1/2 CIR Y416H